# Patient Record
Sex: FEMALE | Race: WHITE | NOT HISPANIC OR LATINO | Employment: FULL TIME | URBAN - METROPOLITAN AREA
[De-identification: names, ages, dates, MRNs, and addresses within clinical notes are randomized per-mention and may not be internally consistent; named-entity substitution may affect disease eponyms.]

---

## 2017-01-09 DIAGNOSIS — Z01.810 PRE-OPERATIVE CARDIOVASCULAR EXAMINATION: ICD-10-CM

## 2017-01-09 DIAGNOSIS — Z01.812 PRE-PROCEDURAL LABORATORY EXAMINATION: ICD-10-CM

## 2017-01-09 LAB
ANION GAP SERPL CALC-SCNC: 7 MMOL/L (ref 0–11.9)
BUN SERPL-MCNC: 10 MG/DL (ref 8–22)
CALCIUM SERPL-MCNC: 9.2 MG/DL (ref 8.5–10.5)
CHLORIDE SERPL-SCNC: 103 MMOL/L (ref 96–112)
CO2 SERPL-SCNC: 27 MMOL/L (ref 20–33)
CREAT SERPL-MCNC: 0.71 MG/DL (ref 0.5–1.4)
ERYTHROCYTE [DISTWIDTH] IN BLOOD BY AUTOMATED COUNT: 44.8 FL (ref 35.9–50)
GFR SERPL CREATININE-BSD FRML MDRD: >60 ML/MIN/1.73 M 2
GLUCOSE SERPL-MCNC: 91 MG/DL (ref 65–99)
HCT VFR BLD AUTO: 31.2 % (ref 37–47)
HGB BLD-MCNC: 9.4 G/DL (ref 12–16)
MCH RBC QN AUTO: 21.3 PG (ref 27–33)
MCHC RBC AUTO-ENTMCNC: 30.1 G/DL (ref 33.6–35)
MCV RBC AUTO: 70.6 FL (ref 81.4–97.8)
PLATELET # BLD AUTO: 771 K/UL (ref 164–446)
PMV BLD AUTO: 9.4 FL (ref 9–12.9)
POTASSIUM SERPL-SCNC: 3.7 MMOL/L (ref 3.6–5.5)
RBC # BLD AUTO: 4.42 M/UL (ref 4.2–5.4)
SODIUM SERPL-SCNC: 137 MMOL/L (ref 135–145)
WBC # BLD AUTO: 6.9 K/UL (ref 4.8–10.8)

## 2017-01-09 PROCEDURE — 85027 COMPLETE CBC AUTOMATED: CPT

## 2017-01-09 PROCEDURE — 36415 COLL VENOUS BLD VENIPUNCTURE: CPT

## 2017-01-09 PROCEDURE — 80048 BASIC METABOLIC PNL TOTAL CA: CPT

## 2017-01-10 LAB — EKG IMPRESSION: NORMAL

## 2017-01-11 ENCOUNTER — OUTPATIENT INFUSION SERVICES (OUTPATIENT)
Dept: ONCOLOGY | Facility: MEDICAL CENTER | Age: 52
End: 2017-01-11
Attending: INTERNAL MEDICINE
Payer: COMMERCIAL

## 2017-01-11 VITALS
SYSTOLIC BLOOD PRESSURE: 134 MMHG | RESPIRATION RATE: 18 BRPM | WEIGHT: 209.44 LBS | DIASTOLIC BLOOD PRESSURE: 76 MMHG | HEART RATE: 79 BPM | TEMPERATURE: 98.1 F | OXYGEN SATURATION: 99 % | BODY MASS INDEX: 37.11 KG/M2 | HEIGHT: 63 IN

## 2017-01-11 PROCEDURE — 700102 HCHG RX REV CODE 250 W/ 637 OVERRIDE(OP): Performed by: INTERNAL MEDICINE

## 2017-01-11 PROCEDURE — 700111 HCHG RX REV CODE 636 W/ 250 OVERRIDE (IP)

## 2017-01-11 PROCEDURE — 700105 HCHG RX REV CODE 258

## 2017-01-11 PROCEDURE — 700111 HCHG RX REV CODE 636 W/ 250 OVERRIDE (IP): Performed by: INTERNAL MEDICINE

## 2017-01-11 PROCEDURE — 306780 HCHG STAT FOR TRANSFUSION PER CASE

## 2017-01-11 PROCEDURE — 96365 THER/PROPH/DIAG IV INF INIT: CPT

## 2017-01-11 PROCEDURE — 96366 THER/PROPH/DIAG IV INF ADDON: CPT

## 2017-01-11 PROCEDURE — 700105 HCHG RX REV CODE 258: Performed by: INTERNAL MEDICINE

## 2017-01-11 PROCEDURE — A9270 NON-COVERED ITEM OR SERVICE: HCPCS | Performed by: INTERNAL MEDICINE

## 2017-01-11 RX ORDER — DIPHENHYDRAMINE HCL 25 MG
25 TABLET ORAL ONCE
Status: COMPLETED | OUTPATIENT
Start: 2017-01-11 | End: 2017-01-11

## 2017-01-11 RX ORDER — ACETAMINOPHEN 325 MG/1
650 TABLET ORAL ONCE
Status: COMPLETED | OUTPATIENT
Start: 2017-01-11 | End: 2017-01-11

## 2017-01-11 RX ADMIN — ACETAMINOPHEN 650 MG: 325 TABLET, FILM COATED ORAL at 11:01

## 2017-01-11 RX ADMIN — IRON DEXTRAN 25 MG: 50 INJECTION INTRAMUSCULAR; INTRAVENOUS at 11:11

## 2017-01-11 RX ADMIN — IRON DEXTRAN 1300 MG: 50 INJECTION INTRAMUSCULAR; INTRAVENOUS at 12:50

## 2017-01-11 RX ADMIN — DIPHENHYDRAMINE HCL 25 MG: 25 TABLET ORAL at 11:01

## 2017-01-11 NOTE — PROGRESS NOTES
IV Iron Per Pharmacy Note    Patient Lean Body Weight:  52 kg  Reason for Iron Replacement: Iron Deficiency Anemia    Lab Results   Component Value Date/Time    WBC 6.9 01/09/2017 08:12 AM    RBC 4.42 01/09/2017 08:12 AM    HEMOGLOBIN 9.4* 01/09/2017 08:12 AM    HEMATOCRIT 31.2* 01/09/2017 08:12 AM    MCV 70.6* 01/09/2017 08:12 AM    MCH 21.3* 01/09/2017 08:12 AM    MCHC 30.1* 01/09/2017 08:12 AM    MPV 9.4 01/09/2017 08:12 AM          Ref. Range 11/4/2016 08:20   Iron Latest Ref Range:  ug/dL 11 (L)   Total Iron Binding Latest Ref Range: 250-450 ug/dL 475 (H)   % Saturation Latest Ref Range: 15-55 % 2 (L)        Recent Labs      01/09/17   0812   CREATININE  0.71          Assessment/Plan:  1. IV Iron Indicated.   2. Give Iron Dextran 25 mg IV test dose following diphenhydramine/acetaminophen premeds over 30 minutes per protocol.  3. If no reaction (Anaphylaxis, Hypotension/Hypertension, N/V/D, Chest pain/Back Pain, Urticaria/Pruritis) in the next hour, proceed to full dose. Nursing to call the pharmacy for full dose.  4. Full dose: Iron Dextran 1300 mg IV over 4 hours. Continue to monitor for delayed ADR including: Arthralgia/myalgia, Headache/backache, chills/dizziness/malaise, moderate to high fever and n/v.      Zahra Wu, AlecD

## 2017-01-12 NOTE — PROGRESS NOTES
Pt arrived to infusion for Iron Dextran. PIV access est, brisk blood return. Pt premedicated per order. Test dose infused hour obs complete. No s/sx of reaction. Remaining dose administered per order. Pt tolerated well. PIV flushed and removed. Pt dc'd home self care in no acute distress. Future appointment confirmed.

## 2017-01-13 PROBLEM — K81.1 CHRONIC CHOLECYSTITIS: Status: ACTIVE | Noted: 2017-01-13

## 2017-01-17 ENCOUNTER — HOSPITAL ENCOUNTER (OUTPATIENT)
Dept: LAB | Facility: MEDICAL CENTER | Age: 52
End: 2017-01-17
Attending: INTERNAL MEDICINE
Payer: COMMERCIAL

## 2017-01-17 LAB
FERRITIN SERPL-MCNC: 746.4 NG/ML (ref 10–291)
FOLATE SERPL-MCNC: 7.4 NG/ML
HBV CORE IGM SERPL QL IA: NEGATIVE
HBV SURFACE AB SER RIA-ACNC: 3.88 MIU/ML (ref 0–10)
HBV SURFACE AG SERPL QL IA: NEGATIVE
HCV AB S/CO SERPL IA: NEGATIVE
HGB RETIC QN AUTO: 35.3 PG/CELL (ref 29–35)
HIV 1+2 AB+HIV1 P24 AG SERPL QL IA: NON REACTIVE
IMM RETIC FRACTION L335166: 35 % (ref 9.3–17.4)
IRON SATN MFR SERPL: 16 % (ref 15–55)
IRON SERPL-MCNC: 73 UG/DL (ref 40–170)
LDH SERPL L TO P-CCNC: 199 U/L (ref 107–266)
RETICS # AUTO: 0.23 M/UL (ref 0.04–0.06)
RETICS/RBC NFR: 5.4 % (ref 0.8–2.1)
TIBC SERPL-MCNC: 449 UG/DL (ref 250–450)
VIT B12 SERPL-MCNC: 290 PG/ML (ref 211–911)

## 2017-01-17 PROCEDURE — 86705 HEP B CORE ANTIBODY IGM: CPT

## 2017-01-17 PROCEDURE — 82728 ASSAY OF FERRITIN: CPT

## 2017-01-17 PROCEDURE — 87389 HIV-1 AG W/HIV-1&-2 AB AG IA: CPT

## 2017-01-17 PROCEDURE — 86803 HEPATITIS C AB TEST: CPT

## 2017-01-17 PROCEDURE — 82607 VITAMIN B-12: CPT

## 2017-01-17 PROCEDURE — 86706 HEP B SURFACE ANTIBODY: CPT

## 2017-01-17 PROCEDURE — 82668 ASSAY OF ERYTHROPOIETIN: CPT

## 2017-01-17 PROCEDURE — 36415 COLL VENOUS BLD VENIPUNCTURE: CPT

## 2017-01-17 PROCEDURE — 83540 ASSAY OF IRON: CPT

## 2017-01-17 PROCEDURE — 87340 HEPATITIS B SURFACE AG IA: CPT

## 2017-01-17 PROCEDURE — 85046 RETICYTE/HGB CONCENTRATE: CPT

## 2017-01-17 PROCEDURE — 82746 ASSAY OF FOLIC ACID SERUM: CPT

## 2017-01-17 PROCEDURE — 83615 LACTATE (LD) (LDH) ENZYME: CPT

## 2017-01-17 PROCEDURE — 83550 IRON BINDING TEST: CPT

## 2017-01-18 LAB — EPO SERPL-ACNC: 39 MU/ML (ref 4–27)

## 2017-01-26 ENCOUNTER — GYNECOLOGY VISIT (OUTPATIENT)
Dept: OBGYN | Facility: MEDICAL CENTER | Age: 52
End: 2017-01-26
Payer: COMMERCIAL

## 2017-01-26 VITALS
BODY MASS INDEX: 37.03 KG/M2 | SYSTOLIC BLOOD PRESSURE: 100 MMHG | DIASTOLIC BLOOD PRESSURE: 66 MMHG | HEIGHT: 63 IN | WEIGHT: 209 LBS

## 2017-01-26 DIAGNOSIS — N93.9 ABNORMAL UTERINE BLEEDING (AUB): ICD-10-CM

## 2017-01-26 DIAGNOSIS — D50.0 IRON DEFICIENCY ANEMIA DUE TO CHRONIC BLOOD LOSS: ICD-10-CM

## 2017-01-26 DIAGNOSIS — D25.9 UTERINE LEIOMYOMA, UNSPECIFIED LOCATION: ICD-10-CM

## 2017-01-26 PROCEDURE — 99203 OFFICE O/P NEW LOW 30 MIN: CPT | Performed by: OBSTETRICS & GYNECOLOGY
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
## 2017-01-26 NOTE — MR AVS SNAPSHOT
"        Toyin Gallagher   2017 9:30 AM   Gynecology Visit   MRN: [id number]    [facility]   Dept Phone:  [phone]    Description:  Female : 1965   Provider:  Neha Montesinos M.D.           Reason for Visit     Other Review u/s results- Fibroids      Allergies as of 2017     No Known Allergies      Vital Signs     Blood Pressure Height Weight Body Mass Index Last Menstrual Period Smoking Status    100/66 mmHg 1.6 m (5' 2.99\") 94.802 kg (209 lb) 37.03 kg/m2 2017 Never Smoker       Basic Information     Date Of Birth Sex Race Ethnicity Preferred Language    1965 Female White Non- English      Your appointments     2017  9:00 AM   CT BODY WITH with [facility]   [facility] [facility]    [facility] [place]  [address]  [address]  [place] [id number] [phone]           Taking medications as regularly scheduled is strongly encouraged.  *For Abdominal CT-Patient needs to  oral contrast and instruction from the department at least 2 hours prior to exam. Patient may  contrast at any imaging facility.            2017 10:00 AM   Hematology Est Patient with Gabriele Gonzalez M.D.   [facility] (--)    [address], [address]  [place] [id number] [phone]              Problem List              ICD-10-CM Priority Class Noted - Resolved    Hyperlipidemia LDL goal <130 E78.5   Unknown - Present    Vitamin D deficiency E55.9   Unknown - Present    Hypothyroidism E03.9   Unknown - Present    Hypertension I10   Unknown - Present    Anemia D64.9   Unknown - Present    Obesity (BMI 35.0-39.9 without comorbidity) (HCC) E66.9   2016 - Present    Iron deficiency anemia D50.9   2016 - Present    Thrombocythemia (CMS-HCC) D47.3   2016 - Present    Chronic cholecystitis K81.1   2017 - Present      Health Maintenance        Date Due Completion Dates    IMM " DTaP/Tdap/Td Vaccine (1 - Tdap) 10/1/1986 ---    IMM INFLUENZA (1) 9/1/2016 ---    MAMMOGRAM 2/16/2017 2/16/2016, 5/31/2013, 5/29/2013    PAP SMEAR 11/3/2019 11/3/2016, 11/3/2016 (Done), 5/31/2013 (Done)    Override on 11/3/2016: Done    Override on 5/31/2013: Done    COLONOSCOPY 12/15/2026 12/15/2016            Current Immunizations     No immunizations on file.      Below and/or attached are the medications your provider expects you to take. Review all of your home medications and newly ordered medications with your provider and/or pharmacist. Follow medication instructions as directed by your provider and/or pharmacist. Please keep your medication list with you and share with your provider. Update the information when medications are discontinued, doses are changed, or new medications (including over-the-counter products) are added; and carry medication information at all times in the event of emergency situations     Allergies:  No Known Allergies          Medications  Valid as of: January 26, 2017 - 10:07 AM    Generic Name Brand Name Tablet Size Instructions for use    Amoxicillin (Cap) AMOXIL 500 MG Take 500 mg by mouth 2 times a day. 2 caps twice a day for 14 days.        Clarithromycin (Tab) BIAXIN 500 MG Take 500 mg by mouth 2 times a day. 1 tab twice a day for 14 days.        GuaiFENesin   Take 400 mg by mouth. Taking 1 tab every 4-5 hours.        Lansoprazole (CAPSULE DELAYED RELEASE) PREVACID 30 MG Take 30 mg by mouth 2 Times a Day. 1 tab twice a day for 14 days.        Levothyroxine Sodium (Tab) SYNTHROID 125 MCG Take 1 Tab by mouth Every morning on an empty stomach.        Triamterene-HCTZ (Cap) MAXZIDE-25/DYAZIDE 37.5-25 MG Take 1 Cap by mouth every morning.        .                 Medicines prescribed today were sent to:     [id number] [address] [facility]    [address] [facility] [place] [address]    Phone: [phone] Fax: [phone]    Open 24 Hours?: No      Medication refill  instructions:       If your prescription bottle indicates you have medication refills left, it is not necessary to call your provider’s office. Please contact your pharmacy and they will refill your medication.    If your prescription bottle indicates you do not have any refills left, you may request refills at any time through one of the following ways: The online [employer] system (except Urgent Care), by calling your provider’s office, or by asking your pharmacy to contact your provider’s office with a refill request. Medication refills are processed only during regular business hours and may not be available until the next business day. Your provider may request additional information or to have a follow-up visit with you prior to refilling your medication.   *Please Note: Medication refills are assigned a new Rx number when refilled electronically. Your pharmacy may indicate that no refills were authorized even though a new prescription for the same medication is available at the pharmacy. Please request the medicine by name with the pharmacy before contacting your provider for a refill.           [employer] Access Code: Activation code not generated  Current [employer] Status: Active

## 2017-02-01 ENCOUNTER — HOSPITAL ENCOUNTER (OUTPATIENT)
Dept: RADIOLOGY | Facility: MEDICAL CENTER | Age: 52
End: 2017-02-01
Attending: INTERNAL MEDICINE
Payer: COMMERCIAL

## 2017-02-01 DIAGNOSIS — A04.8 POSITIVE H. PYLORI TEST: ICD-10-CM

## 2017-02-01 PROCEDURE — 71260 CT THORAX DX C+: CPT

## 2017-02-01 PROCEDURE — 700117 HCHG RX CONTRAST REV CODE 255: Performed by: INTERNAL MEDICINE

## 2017-02-01 RX ADMIN — IOHEXOL 100 ML: 350 INJECTION, SOLUTION INTRAVENOUS at 09:20

## 2017-02-15 DIAGNOSIS — E78.5 HYPERLIPIDEMIA LDL GOAL <130: ICD-10-CM

## 2017-02-15 DIAGNOSIS — E03.8 OTHER SPECIFIED HYPOTHYROIDISM: ICD-10-CM

## 2017-02-15 RX ORDER — LEVOTHYROXINE SODIUM 0.12 MG/1
125 TABLET ORAL
Qty: 90 TAB | Refills: 2 | Status: SHIPPED | OUTPATIENT
Start: 2017-02-15 | End: 2017-11-28 | Stop reason: SDUPTHER

## 2017-02-15 RX ORDER — TRIAMTERENE AND HYDROCHLOROTHIAZIDE 37.5; 25 MG/1; MG/1
1 CAPSULE ORAL EVERY MORNING
Qty: 90 CAP | Refills: 2 | Status: SHIPPED | OUTPATIENT
Start: 2017-02-15 | End: 2017-11-28 | Stop reason: SDUPTHER

## 2017-02-15 NOTE — TELEPHONE ENCOUNTER
Was the patient seen in the last year in this department? Yes 12-   Does patient have an active prescription for medications requested? No     Received Request Via: Pharmacy

## 2017-02-22 ENCOUNTER — GYNECOLOGY VISIT (OUTPATIENT)
Dept: OBGYN | Facility: MEDICAL CENTER | Age: 52
End: 2017-02-22
Payer: COMMERCIAL

## 2017-02-22 ENCOUNTER — HOSPITAL ENCOUNTER (OUTPATIENT)
Facility: MEDICAL CENTER | Age: 52
End: 2017-02-22
Attending: OBSTETRICS & GYNECOLOGY
Payer: COMMERCIAL

## 2017-02-22 VITALS
HEIGHT: 63 IN | WEIGHT: 216 LBS | SYSTOLIC BLOOD PRESSURE: 100 MMHG | DIASTOLIC BLOOD PRESSURE: 76 MMHG | BODY MASS INDEX: 38.27 KG/M2

## 2017-02-22 DIAGNOSIS — N93.9 ABNORMAL UTERINE BLEEDING (AUB): ICD-10-CM

## 2017-02-22 DIAGNOSIS — D25.9 UTERINE LEIOMYOMA, UNSPECIFIED LOCATION: ICD-10-CM

## 2017-02-22 LAB
INT CON NEG: NEGATIVE
INT CON POS: POSITIVE
PATHOLOGY CONSULT NOTE: NORMAL
POC URINE PREGNANCY TEST: NEGATIVE

## 2017-02-22 PROCEDURE — 58100 BIOPSY OF UTERUS LINING: CPT | Performed by: OBSTETRICS & GYNECOLOGY

## 2017-02-22 PROCEDURE — 81025 URINE PREGNANCY TEST: CPT | Performed by: OBSTETRICS & GYNECOLOGY

## 2017-02-22 PROCEDURE — 88305 TISSUE EXAM BY PATHOLOGIST: CPT

## 2017-02-22 NOTE — MR AVS SNAPSHOT
"        Toyin Gallagher   2017 10:15 AM   Gynecology Visit   MRN: [id number]    Department:  [facility]   Dept Phone:  [phone]    Description:  Female : 1965   Provider:  Neha Montesinos M.D.           Reason for Visit     Procedure ENDOMETRAIL BIOPSY       Allergies as of 2017     No Known Allergies      You were diagnosed with     Abnormal uterine bleeding (AUB)   [4074407]       Uterine leiomyoma, unspecified location   [[id number]]         Vital Signs     Blood Pressure Height Weight Body Mass Index Last Menstrual Period Smoking Status    100/76 mmHg 1.6 m (5' 2.99\") 97.977 kg (216 lb) 38.27 kg/m2 2017 Never Smoker       Basic Information     Date Of Birth Sex Race Ethnicity Preferred Language    1965 Female White Non- English      Your appointments     2017 10:00 AM   Hematology Est Patient with Gabriele Gonzalez M.D.   [facility] (--)    [address] [place] [address], [address]  [facility] [id number] [phone]              Problem List              ICD-10-CM Priority Class Noted - Resolved    Hyperlipidemia LDL goal <130 E78.5   Unknown - Present    Vitamin D deficiency E55.9   Unknown - Present    Hypothyroidism E03.9   Unknown - Present    Hypertension I10   Unknown - Present    Anemia D64.9   Unknown - Present    Obesity (BMI 35.0-39.9 without comorbidity) ([facility]) E66.9   2016 - Present    Iron deficiency anemia D50.9   2016 - Present    Thrombocythemia (CMS-HCC) D47.3   2016 - Present    Chronic cholecystitis K81.1   2017 - Present      Health Maintenance        Date Due Completion Dates    IMM DTaP/Tdap/Td Vaccine (1 - Tdap) 10/1/1986 ---    IMM INFLUENZA (1) 2016 ---    MAMMOGRAM 2017, 2013, 2013    PAP SMEAR 11/3/2019 11/3/2016, 11/3/2016 (Done), 2013 (Done)    Override on 11/3/2016: Done    Override on 2013: Done    COLONOSCOPY 12/15/2026 12/15/2016            Current " Immunizations     No immunizations on file.      Below and/or attached are the medications your provider expects you to take. Review all of your home medications and newly ordered medications with your provider and/or pharmacist. Follow medication instructions as directed by your provider and/or pharmacist. Please keep your medication list with you and share with your provider. Update the information when medications are discontinued, doses are changed, or new medications (including over-the-counter products) are added; and carry medication information at all times in the event of emergency situations     Allergies:  No Known Allergies          Medications  Valid as of: February 22, 2017 - 11:00 AM    Generic Name Brand Name Tablet Size Instructions for use    Amoxicillin (Cap) AMOXIL 500 MG Take 500 mg by mouth 2 times a day. 2 caps twice a day for 14 days.        Clarithromycin (Tab) BIAXIN 500 MG Take 500 mg by mouth 2 times a day. 1 tab twice a day for 14 days.        GuaiFENesin   Take 400 mg by mouth. Taking 1 tab every 4-5 hours.        Lansoprazole (CAPSULE DELAYED RELEASE) PREVACID 30 MG Take 30 mg by mouth 2 Times a Day. 1 tab twice a day for 14 days.        Levothyroxine Sodium (Tab) SYNTHROID 125 MCG Take 1 Tab by mouth Every morning on an empty stomach.        Triamterene-HCTZ (Cap) MAXZIDE-25/DYAZIDE 37.5-25 MG Take 1 Cap by mouth every morning.        .                 Medicines prescribed today were sent to:     [facility] [id number] [facility] [place] - [address] [address] [address]    Phone: [phone] Fax: [phone]    Open 24 Hours?: No      Medication refill instructions:       If your prescription bottle indicates you have medication refills left, it is not necessary to call your provider’s office. Please contact your pharmacy and they will refill your medication.    If your prescription bottle indicates you do not have any refills left, you may request refills at any time  through one of the following ways: The online [employer] system (except Urgent Care), by calling your provider’s office, or by asking your pharmacy to contact your provider’s office with a refill request. Medication refills are processed only during regular business hours and may not be available until the next business day. Your provider may request additional information or to have a follow-up visit with you prior to refilling your medication.   *Please Note: Medication refills are assigned a new Rx number when refilled electronically. Your pharmacy may indicate that no refills were authorized even though a new prescription for the same medication is available at the pharmacy. Please request the medicine by name with the pharmacy before contacting your provider for a refill.        Your To Do List     Future Labs/Procedures Complete By Expires    PATHOLOGY SPECIMEN  As directed 2/23/2018         [employer] Access Code: Activation code not generated  Current [employer] Status: Active

## 2017-02-23 ENCOUNTER — OFFICE VISIT (OUTPATIENT)
Dept: HEMATOLOGY ONCOLOGY | Facility: MEDICAL CENTER | Age: 52
End: 2017-02-23
Payer: COMMERCIAL

## 2017-02-23 VITALS
WEIGHT: 215 LBS | OXYGEN SATURATION: 98 % | TEMPERATURE: 97.4 F | RESPIRATION RATE: 16 BRPM | BODY MASS INDEX: 38.09 KG/M2 | DIASTOLIC BLOOD PRESSURE: 80 MMHG | HEART RATE: 65 BPM | SYSTOLIC BLOOD PRESSURE: 120 MMHG

## 2017-02-23 DIAGNOSIS — D50.0 IRON DEFICIENCY ANEMIA DUE TO CHRONIC BLOOD LOSS: ICD-10-CM

## 2017-02-23 PROCEDURE — 99213 OFFICE O/P EST LOW 20 MIN: CPT | Performed by: INTERNAL MEDICINE

## 2017-02-23 ASSESSMENT — PAIN SCALES - GENERAL: PAINLEVEL: 2=MINIMAL-SLIGHT
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
## 2017-02-23 NOTE — MR AVS SNAPSHOT
Toyin Gallagher   2017 10:00 AM   Office Visit   MRN: [id number]    Department:  [facility]   Dept Phone:  [phone]    Description:  Female : 1965   Provider:  Gabriele Gonzalez M.D.           Reason for Visit     Follow-Up           Allergies as of 2017     No Known Allergies      You were diagnosed with     Iron deficiency anemia due to chronic blood loss   [[id number]]         Vital Signs     Blood Pressure Pulse Temperature Respirations Weight Oxygen Saturation    120/80 mmHg 65 36.3 °C (97.4 °F) 16 97.523 kg (215 lb) 98%    Last Menstrual Period Breastfeeding? Smoking Status             2017 No Never Smoker          Basic Information     Date Of Birth Sex Race Ethnicity Preferred Language    1965 Female White Non- English      Your appointments     May 24, 2017 10:40 AM   Hematology Est Patient with Gabriele Gonzalez M.D.   [facility] (--)    [address] [place] [address], [address]  [facility] [address] [phone]              Problem List              ICD-10-CM Priority Class Noted - Resolved    Hyperlipidemia LDL goal <130 E78.5   Unknown - Present    Vitamin D deficiency E55.9   Unknown - Present    Hypothyroidism E03.9   Unknown - Present    Hypertension I10   Unknown - Present    Anemia D64.9   Unknown - Present    Obesity (BMI 35.0-39.9 without comorbidity) (HCC) E66.9   2016 - Present    Iron deficiency anemia D50.9   2016 - Present    Thrombocythemia (CMS-HCC) D47.3   2016 - Present    Chronic cholecystitis K81.1   2017 - Present      Health Maintenance        Date Due Completion Dates    IMM DTaP/Tdap/Td Vaccine (1 - Tdap) 10/1/1986 ---    IMM INFLUENZA (1) 2016 ---    MAMMOGRAM 2017, 2013, 2013    PAP SMEAR 11/3/2019 11/3/2016, 11/3/2016 (Done), 2013 (Done)    Override on 11/3/2016: Done    Override on 2013: Done    COLONOSCOPY 12/15/2026 12/15/2016            Current Immunizations     No  immunizations on file.      Below and/or attached are the medications your provider expects you to take. Review all of your home medications and newly ordered medications with your provider and/or pharmacist. Follow medication instructions as directed by your provider and/or pharmacist. Please keep your medication list with you and share with your provider. Update the information when medications are discontinued, doses are changed, or new medications (including over-the-counter products) are added; and carry medication information at all times in the event of emergency situations     Allergies:  No Known Allergies          Medications  Valid as of: February 23, 2017 - 10:23 AM    Generic Name Brand Name Tablet Size Instructions for use    Amoxicillin (Cap) AMOXIL 500 MG Take 500 mg by mouth 2 times a day. 2 caps twice a day for 14 days.        Clarithromycin (Tab) BIAXIN 500 MG Take 500 mg by mouth 2 times a day. 1 tab twice a day for 14 days.        GuaiFENesin   Take 400 mg by mouth. Taking 1 tab every 4-5 hours.        Lansoprazole (CAPSULE DELAYED RELEASE) PREVACID 30 MG Take 30 mg by mouth 2 Times a Day. 1 tab twice a day for 14 days.        Levothyroxine Sodium (Tab) SYNTHROID 125 MCG Take 1 Tab by mouth Every morning on an empty stomach.        Triamterene-HCTZ (Cap) MAXZIDE-25/DYAZIDE 37.5-25 MG Take 1 Cap by mouth every morning.        .                 Medicines prescribed today were sent to:     [id number] [place] - [address] [facility]    [address] [facility] [address]    Phone: [phone] Fax: [phone]    Open 24 Hours?: No      Medication refill instructions:       If your prescription bottle indicates you have medication refills left, it is not necessary to call your provider’s office. Please contact your pharmacy and they will refill your medication.    If your prescription bottle indicates you do not have any refills left, you may request refills at any time through one of the  following ways: The online [employer] system (except Urgent Care), by calling your provider’s office, or by asking your pharmacy to contact your provider’s office with a refill request. Medication refills are processed only during regular business hours and may not be available until the next business day. Your provider may request additional information or to have a follow-up visit with you prior to refilling your medication.   *Please Note: Medication refills are assigned a new Rx number when refilled electronically. Your pharmacy may indicate that no refills were authorized even though a new prescription for the same medication is available at the pharmacy. Please request the medicine by name with the pharmacy before contacting your provider for a refill.        Your To Do List     Future Labs/Procedures Complete By Expires    CBC WITH DIFFERENTIAL  5/16/2017 2/23/2018    FERRITIN  5/16/2017 2/23/2018    IRON/TOTAL IRON BIND  5/16/2017 2/23/2018         [employer] Access Code: Activation code not generated  Current [employer] Status: Active
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
## 2017-05-23 ENCOUNTER — HOSPITAL ENCOUNTER (OUTPATIENT)
Dept: LAB | Facility: MEDICAL CENTER | Age: 52
End: 2017-05-23
Attending: INTERNAL MEDICINE
Payer: COMMERCIAL

## 2017-05-23 DIAGNOSIS — D50.0 IRON DEFICIENCY ANEMIA DUE TO CHRONIC BLOOD LOSS: ICD-10-CM

## 2017-05-23 LAB
BASOPHILS # BLD AUTO: 0.4 % (ref 0–1.8)
BASOPHILS # BLD: 0.03 K/UL (ref 0–0.12)
EOSINOPHIL # BLD AUTO: 0.18 K/UL (ref 0–0.51)
EOSINOPHIL NFR BLD: 2.6 % (ref 0–6.9)
ERYTHROCYTE [DISTWIDTH] IN BLOOD BY AUTOMATED COUNT: 43.7 FL (ref 35.9–50)
FERRITIN SERPL-MCNC: 8.7 NG/ML (ref 10–291)
HCT VFR BLD AUTO: 39.4 % (ref 37–47)
HGB BLD-MCNC: 12.4 G/DL (ref 12–16)
IMM GRANULOCYTES # BLD AUTO: 0.02 K/UL (ref 0–0.11)
IMM GRANULOCYTES NFR BLD AUTO: 0.3 % (ref 0–0.9)
IRON SATN MFR SERPL: 14 % (ref 15–55)
IRON SERPL-MCNC: 62 UG/DL (ref 40–170)
LYMPHOCYTES # BLD AUTO: 2.16 K/UL (ref 1–4.8)
LYMPHOCYTES NFR BLD: 31.2 % (ref 22–41)
MCH RBC QN AUTO: 26.1 PG (ref 27–33)
MCHC RBC AUTO-ENTMCNC: 31.5 G/DL (ref 33.6–35)
MCV RBC AUTO: 82.8 FL (ref 81.4–97.8)
MONOCYTES # BLD AUTO: 0.57 K/UL (ref 0–0.85)
MONOCYTES NFR BLD AUTO: 8.2 % (ref 0–13.4)
NEUTROPHILS # BLD AUTO: 3.96 K/UL (ref 2–7.15)
NEUTROPHILS NFR BLD: 57.3 % (ref 44–72)
NRBC # BLD AUTO: 0 K/UL
NRBC BLD AUTO-RTO: 0 /100 WBC
PLATELET # BLD AUTO: 502 K/UL (ref 164–446)
PMV BLD AUTO: 10.1 FL (ref 9–12.9)
RBC # BLD AUTO: 4.76 M/UL (ref 4.2–5.4)
TIBC SERPL-MCNC: 441 UG/DL (ref 250–450)
WBC # BLD AUTO: 6.9 K/UL (ref 4.8–10.8)

## 2017-05-23 PROCEDURE — 83550 IRON BINDING TEST: CPT

## 2017-05-23 PROCEDURE — 85025 COMPLETE CBC W/AUTO DIFF WBC: CPT

## 2017-05-23 PROCEDURE — 82728 ASSAY OF FERRITIN: CPT

## 2017-05-23 PROCEDURE — 83540 ASSAY OF IRON: CPT

## 2017-05-23 PROCEDURE — 36415 COLL VENOUS BLD VENIPUNCTURE: CPT

## 2017-05-24 ENCOUNTER — OFFICE VISIT (OUTPATIENT)
Dept: HEMATOLOGY ONCOLOGY | Facility: MEDICAL CENTER | Age: 52
End: 2017-05-24
Payer: COMMERCIAL

## 2017-05-24 VITALS
OXYGEN SATURATION: 95 % | HEART RATE: 66 BPM | WEIGHT: 226.85 LBS | RESPIRATION RATE: 16 BRPM | DIASTOLIC BLOOD PRESSURE: 68 MMHG | TEMPERATURE: 99 F | BODY MASS INDEX: 40.2 KG/M2 | SYSTOLIC BLOOD PRESSURE: 110 MMHG | HEIGHT: 63 IN

## 2017-05-24 DIAGNOSIS — D50.0 IRON DEFICIENCY ANEMIA DUE TO CHRONIC BLOOD LOSS: ICD-10-CM

## 2017-05-24 PROCEDURE — 99213 OFFICE O/P EST LOW 20 MIN: CPT | Performed by: INTERNAL MEDICINE

## 2017-05-24 ASSESSMENT — PAIN SCALES - GENERAL: PAINLEVEL: NO PAIN
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
## 2017-05-24 NOTE — MR AVS SNAPSHOT
"        Toyin Gallagher   2017 10:40 AM   Office Visit   MRN: [id number]    Department:  [facility]   Dept Phone:  [phone]    Description:  Female : 1965   Provider:  Gabriele Gonzalez M.D.           Reason for Visit     Follow-Up 3 mo with labs      Allergies as of 2017     No Known Allergies      You were diagnosed with     Iron deficiency anemia due to chronic blood loss   [199788]         Vital Signs     Blood Pressure Pulse Temperature Respirations Height Weight    110/68 mmHg 66 37.2 °C (99 °F) 16 1.6 m (5' 2.99\") 102.9 kg (226 lb 13.7 oz)    Body Mass Index Oxygen Saturation Breastfeeding? Smoking Status          40.20 kg/m2 95% No Never Smoker         Basic Information     Date Of Birth Sex Race Ethnicity Preferred Language    1965 Female White Non- English      Your appointments     Aug 24, 2017 10:40 AM   Hematology Est Patient with Gabriele Gonzalez M.D.   [facility] (--)    [address] [place] [address], [address]  [facility] [id number] [phone]              Problem List              ICD-10-CM Priority Class Noted - Resolved    Hyperlipidemia LDL goal <130 E78.5   Unknown - Present    Vitamin D deficiency E55.9   Unknown - Present    Hypothyroidism E03.9   Unknown - Present    Hypertension I10   Unknown - Present    Anemia D64.9   Unknown - Present    Obesity (BMI 35.0-39.9 without comorbidity) ([facility]) E66.9   2016 - Present    Iron deficiency anemia D50.9   2016 - Present    Thrombocythemia (CMS-HCC) D47.3   2016 - Present    Chronic cholecystitis K81.1   2017 - Present      Health Maintenance        Date Due Completion Dates    IMM DTaP/Tdap/Td Vaccine (1 - Tdap) 10/1/1986 ---    MAMMOGRAM 2017, 2013    PAP SMEAR 11/3/2019 11/3/2016, 11/3/2016 (Done), 2013 (Done)    Override on 11/3/2016: Done    Override on 2013: Done    COLONOSCOPY 12/15/2026 12/15/2016            Current Immunizations     No immunizations " on file.      Below and/or attached are the medications your provider expects you to take. Review all of your home medications and newly ordered medications with your provider and/or pharmacist. Follow medication instructions as directed by your provider and/or pharmacist. Please keep your medication list with you and share with your provider. Update the information when medications are discontinued, doses are changed, or new medications (including over-the-counter products) are added; and carry medication information at all times in the event of emergency situations     Allergies:  No Known Allergies          Medications  Valid as of: May 24, 2017 - 11:00 AM    Generic Name Brand Name Tablet Size Instructions for use    Amoxicillin (Cap) AMOXIL 500 MG Take 500 mg by mouth 2 times a day. 2 caps twice a day for 14 days.        Clarithromycin (Tab) BIAXIN 500 MG Take 500 mg by mouth 2 times a day. 1 tab twice a day for 14 days.        GuaiFENesin   Take 400 mg by mouth. Taking 1 tab every 4-5 hours.        Lansoprazole (CAPSULE DELAYED RELEASE) PREVACID 30 MG Take 30 mg by mouth 2 Times a Day. 1 tab twice a day for 14 days.        Levothyroxine Sodium (Tab) SYNTHROID 125 MCG Take 1 Tab by mouth Every morning on an empty stomach.        Triamterene-HCTZ (Cap) MAXZIDE-25/DYAZIDE 37.5-25 MG Take 1 Cap by mouth every morning.        .                 Medicines prescribed today were sent to:     [facility] [address] [place] [address] [facility] [place]    [address] [facility] [address]    Phone: [phone] Fax: [phone]    Open 24 Hours?: No      Medication refill instructions:       If your prescription bottle indicates you have medication refills left, it is not necessary to call your provider’s office. Please contact your pharmacy and they will refill your medication.    If your prescription bottle indicates you do not have any refills left, you may request refills at any time through one of the following ways: The online  [employer] system (except Urgent Care), by calling your provider’s office, or by asking your pharmacy to contact your provider’s office with a refill request. Medication refills are processed only during regular business hours and may not be available until the next business day. Your provider may request additional information or to have a follow-up visit with you prior to refilling your medication.   *Please Note: Medication refills are assigned a new Rx number when refilled electronically. Your pharmacy may indicate that no refills were authorized even though a new prescription for the same medication is available at the pharmacy. Please request the medicine by name with the pharmacy before contacting your provider for a refill.        Your To Do List     Standing Orders Interval Expires    CBC WITH DIFFERENTIAL  3 mo until 5/24/2018 5/24/2018    FERRITIN  3 mo until 5/24/2018 5/24/2018         magnetUt Access Code: Activation code not generated  Current [employer] Status: Active

## 2017-08-21 ENCOUNTER — APPOINTMENT (OUTPATIENT)
Dept: LAB | Facility: MEDICAL CENTER | Age: 52
End: 2017-08-21
Attending: INTERNAL MEDICINE
Payer: COMMERCIAL

## 2017-08-21 DIAGNOSIS — D50.0 IRON DEFICIENCY ANEMIA DUE TO CHRONIC BLOOD LOSS: ICD-10-CM

## 2017-08-21 LAB
BASOPHILS # BLD AUTO: 0.9 % (ref 0–1.8)
BASOPHILS # BLD: 0.06 K/UL (ref 0–0.12)
EOSINOPHIL # BLD AUTO: 0.23 K/UL (ref 0–0.51)
EOSINOPHIL NFR BLD: 3.6 % (ref 0–6.9)
ERYTHROCYTE [DISTWIDTH] IN BLOOD BY AUTOMATED COUNT: 43.8 FL (ref 35.9–50)
FERRITIN SERPL-MCNC: 4.6 NG/ML (ref 10–291)
HCT VFR BLD AUTO: 36 % (ref 37–47)
HGB BLD-MCNC: 10.8 G/DL (ref 12–16)
IMM GRANULOCYTES # BLD AUTO: 0.02 K/UL (ref 0–0.11)
IMM GRANULOCYTES NFR BLD AUTO: 0.3 % (ref 0–0.9)
LYMPHOCYTES # BLD AUTO: 1.48 K/UL (ref 1–4.8)
LYMPHOCYTES NFR BLD: 23.1 % (ref 22–41)
MCH RBC QN AUTO: 23.7 PG (ref 27–33)
MCHC RBC AUTO-ENTMCNC: 30 G/DL (ref 33.6–35)
MCV RBC AUTO: 79.1 FL (ref 81.4–97.8)
MONOCYTES # BLD AUTO: 0.68 K/UL (ref 0–0.85)
MONOCYTES NFR BLD AUTO: 10.6 % (ref 0–13.4)
NEUTROPHILS # BLD AUTO: 3.95 K/UL (ref 2–7.15)
NEUTROPHILS NFR BLD: 61.5 % (ref 44–72)
NRBC # BLD AUTO: 0 K/UL
NRBC BLD AUTO-RTO: 0 /100 WBC
PLATELET # BLD AUTO: 539 K/UL (ref 164–446)
PMV BLD AUTO: 10.3 FL (ref 9–12.9)
RBC # BLD AUTO: 4.55 M/UL (ref 4.2–5.4)
WBC # BLD AUTO: 6.4 K/UL (ref 4.8–10.8)

## 2017-08-21 PROCEDURE — 36415 COLL VENOUS BLD VENIPUNCTURE: CPT

## 2017-08-21 PROCEDURE — 85025 COMPLETE CBC W/AUTO DIFF WBC: CPT

## 2017-08-21 PROCEDURE — 82728 ASSAY OF FERRITIN: CPT

## 2017-08-24 ENCOUNTER — OFFICE VISIT (OUTPATIENT)
Dept: HEMATOLOGY ONCOLOGY | Facility: MEDICAL CENTER | Age: 52
End: 2017-08-24
Payer: COMMERCIAL

## 2017-08-24 VITALS
OXYGEN SATURATION: 100 % | WEIGHT: 216.38 LBS | TEMPERATURE: 98 F | HEIGHT: 63 IN | BODY MASS INDEX: 38.34 KG/M2 | DIASTOLIC BLOOD PRESSURE: 74 MMHG | SYSTOLIC BLOOD PRESSURE: 112 MMHG | HEART RATE: 72 BPM | RESPIRATION RATE: 16 BRPM

## 2017-08-24 DIAGNOSIS — D50.0 IRON DEFICIENCY ANEMIA DUE TO CHRONIC BLOOD LOSS: ICD-10-CM

## 2017-08-24 PROCEDURE — 99213 OFFICE O/P EST LOW 20 MIN: CPT | Performed by: INTERNAL MEDICINE

## 2017-08-24 ASSESSMENT — PAIN SCALES - GENERAL: PAINLEVEL: NO PAIN
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
## 2017-08-24 NOTE — MR AVS SNAPSHOT
"        Toyin Gallagher   2017 10:40 AM   Office Visit   MRN: [id number]    Department:  [facility]   Dept Phone:  [phone]    Description:  Female : 1965   Provider:  Gabriele Gonzalez M.D.           Reason for Visit     Follow-Up 3 month f/v      Allergies as of 2017     No Known Allergies      You were diagnosed with     Iron deficiency anemia due to chronic blood loss   [377261]         Vital Signs     Blood Pressure Pulse Temperature Respirations Height Weight    112/74 mmHg 72 36.7 °C (98 °F) 16 1.6 m (5' 2.99\") 98.15 kg (216 lb 6.1 oz)    Body Mass Index Oxygen Saturation Breastfeeding? Smoking Status          38.34 kg/m2 100% No Never Smoker         Basic Information     Date Of Birth Sex Race Ethnicity Preferred Language    1965 Female White Non- English      Your appointments     Sep 23, 2017  8:30 AM   EST Total Body Iron with RN 3   [facility] ([facility])    [address]  [place] [id number] [phone]              Problem List              ICD-10-CM Priority Class Noted - Resolved    Hyperlipidemia LDL goal <130 E78.5   Unknown - Present    Vitamin D deficiency E55.9   Unknown - Present    Hypothyroidism E03.9   Unknown - Present    Hypertension I10   Unknown - Present    Anemia D64.9   Unknown - Present    Obesity (BMI 35.0-39.9 without comorbidity) ([facility]) E66.9   2016 - Present    Iron deficiency anemia D50.9   2016 - Present    Thrombocythemia (CMS-HCC) D47.3   2016 - Present    Chronic cholecystitis K81.1   2017 - Present      Health Maintenance        Date Due Completion Dates    IMM DTaP/Tdap/Td Vaccine (1 - Tdap) 10/1/1986 ---    MAMMOGRAM 2017, 2013    IMM INFLUENZA (1) 2017 ---    PAP SMEAR 11/3/2019 11/3/2016, 11/3/2016 (Done), 2013 (Done)    Override on 11/3/2016: Done    Override on 2013: Done    COLONOSCOPY 12/15/2026 12/15/2016            Current Immunizations     No " immunizations on file.      Below and/or attached are the medications your provider expects you to take. Review all of your home medications and newly ordered medications with your provider and/or pharmacist. Follow medication instructions as directed by your provider and/or pharmacist. Please keep your medication list with you and share with your provider. Update the information when medications are discontinued, doses are changed, or new medications (including over-the-counter products) are added; and carry medication information at all times in the event of emergency situations     Allergies:  No Known Allergies          Medications  Valid as of: August 24, 2017 - 11:18 AM    Generic Name Brand Name Tablet Size Instructions for use    Amoxicillin (Cap) AMOXIL 500 MG Take 500 mg by mouth 2 times a day. 2 caps twice a day for 14 days.        Clarithromycin (Tab) BIAXIN 500 MG Take 500 mg by mouth 2 times a day. 1 tab twice a day for 14 days.        GuaiFENesin   Take 400 mg by mouth. Taking 1 tab every 4-5 hours.        Lansoprazole (CAPSULE DELAYED RELEASE) PREVACID 30 MG Take 30 mg by mouth 2 Times a Day. 1 tab twice a day for 14 days.        Levothyroxine Sodium (Tab) SYNTHROID 125 MCG Take 1 Tab by mouth Every morning on an empty stomach.        Triamterene-HCTZ (Cap) MAXZIDE-25/DYAZIDE 37.5-25 MG Take 1 Cap by mouth every morning.        .                 Medicines prescribed today were sent to:     [id number] [place] - [address] [facility] [place]    [address] [facility] [address]    Phone: [phone] Fax: [phone]    Open 24 Hours?: No      Medication refill instructions:       If your prescription bottle indicates you have medication refills left, it is not necessary to call your provider’s office. Please contact your pharmacy and they will refill your medication.    If your prescription bottle indicates you do not have any refills left, you may request refills at any time through one of the following  ways: The online [employer] system (except Urgent Care), by calling your provider’s office, or by asking your pharmacy to contact your provider’s office with a refill request. Medication refills are processed only during regular business hours and may not be available until the next business day. Your provider may request additional information or to have a follow-up visit with you prior to refilling your medication.   *Please Note: Medication refills are assigned a new Rx number when refilled electronically. Your pharmacy may indicate that no refills were authorized even though a new prescription for the same medication is available at the pharmacy. Please request the medicine by name with the pharmacy before contacting your provider for a refill.        Your To Do List     Standing Orders Interval Expires    CBC WITH DIFFERENTIAL  6 mo until 8/24/2018 8/24/2018    FERRITIN  6 mo until 8/24/2018 8/24/2018    VITAMIN B12  6 mo until 8/24/2018 8/24/2018         [facility] Access Code: Activation code not generated  Current [facility] Status: Active

## 2017-09-23 ENCOUNTER — OUTPATIENT INFUSION SERVICES (OUTPATIENT)
Dept: ONCOLOGY | Facility: MEDICAL CENTER | Age: 52
End: 2017-09-23
Attending: INTERNAL MEDICINE
Payer: COMMERCIAL

## 2017-09-23 VITALS
RESPIRATION RATE: 18 BRPM | HEIGHT: 63 IN | TEMPERATURE: 98.2 F | HEART RATE: 57 BPM | DIASTOLIC BLOOD PRESSURE: 73 MMHG | WEIGHT: 210.1 LBS | BODY MASS INDEX: 37.23 KG/M2 | OXYGEN SATURATION: 100 % | SYSTOLIC BLOOD PRESSURE: 112 MMHG

## 2017-09-23 DIAGNOSIS — D50.0 IRON DEFICIENCY ANEMIA DUE TO CHRONIC BLOOD LOSS: ICD-10-CM

## 2017-09-23 LAB
ERYTHROCYTE [DISTWIDTH] IN BLOOD BY AUTOMATED COUNT: 42.5 FL (ref 35.9–50)
HCT VFR BLD AUTO: 33.2 % (ref 37–47)
HGB BLD-MCNC: 10.1 G/DL (ref 12–16)
HGB RETIC QN AUTO: 24.1 PG/CELL (ref 29–35)
IMM RETICS NFR: 26.8 % (ref 9.3–17.4)
IRON SATN MFR SERPL: 5 % (ref 15–55)
IRON SERPL-MCNC: 24 UG/DL (ref 40–170)
MCH RBC QN AUTO: 23 PG (ref 27–33)
MCHC RBC AUTO-ENTMCNC: 30.4 G/DL (ref 33.6–35)
MCV RBC AUTO: 75.5 FL (ref 81.4–97.8)
PLATELET # BLD AUTO: 557 K/UL (ref 164–446)
PMV BLD AUTO: 9.5 FL (ref 9–12.9)
RBC # BLD AUTO: 4.4 M/UL (ref 4.2–5.4)
RETICS # AUTO: 0.04 M/UL (ref 0.04–0.06)
RETICS/RBC NFR: 0.9 % (ref 0.8–2.1)
TIBC SERPL-MCNC: 440 UG/DL (ref 250–450)
WBC # BLD AUTO: 6 K/UL (ref 4.8–10.8)

## 2017-09-23 PROCEDURE — 83550 IRON BINDING TEST: CPT

## 2017-09-23 PROCEDURE — 96366 THER/PROPH/DIAG IV INF ADDON: CPT

## 2017-09-23 PROCEDURE — 83540 ASSAY OF IRON: CPT

## 2017-09-23 PROCEDURE — 306780 HCHG STAT FOR TRANSFUSION PER CASE

## 2017-09-23 PROCEDURE — 700105 HCHG RX REV CODE 258: Performed by: INTERNAL MEDICINE

## 2017-09-23 PROCEDURE — 96365 THER/PROPH/DIAG IV INF INIT: CPT

## 2017-09-23 PROCEDURE — 700102 HCHG RX REV CODE 250 W/ 637 OVERRIDE(OP): Performed by: INTERNAL MEDICINE

## 2017-09-23 PROCEDURE — A9270 NON-COVERED ITEM OR SERVICE: HCPCS | Performed by: INTERNAL MEDICINE

## 2017-09-23 PROCEDURE — 85027 COMPLETE CBC AUTOMATED: CPT

## 2017-09-23 PROCEDURE — 85046 RETICYTE/HGB CONCENTRATE: CPT

## 2017-09-23 PROCEDURE — 700111 HCHG RX REV CODE 636 W/ 250 OVERRIDE (IP): Performed by: INTERNAL MEDICINE

## 2017-09-23 PROCEDURE — 36415 COLL VENOUS BLD VENIPUNCTURE: CPT

## 2017-09-23 RX ORDER — DIPHENHYDRAMINE HCL 25 MG
25 TABLET ORAL ONCE
Status: COMPLETED | OUTPATIENT
Start: 2017-09-23 | End: 2017-09-23

## 2017-09-23 RX ORDER — ACETAMINOPHEN 325 MG/1
650 TABLET ORAL ONCE
Status: COMPLETED | OUTPATIENT
Start: 2017-09-23 | End: 2017-09-23

## 2017-09-23 RX ADMIN — IRON DEXTRAN 25 MG: 50 INJECTION INTRAMUSCULAR; INTRAVENOUS at 09:49

## 2017-09-23 RX ADMIN — ACETAMINOPHEN 650 MG: 325 TABLET, FILM COATED ORAL at 09:24

## 2017-09-23 RX ADMIN — IRON DEXTRAN 1250 MG: 50 INJECTION INTRAMUSCULAR; INTRAVENOUS at 11:38

## 2017-09-23 RX ADMIN — DIPHENHYDRAMINE HCL 25 MG: 25 TABLET ORAL at 09:24

## 2017-09-23 ASSESSMENT — PAIN SCALES - GENERAL: PAINLEVEL: NO PAIN

## 2017-09-23 NOTE — PROGRESS NOTES
Pt presented to infusion center for Iron Dextran. Pt has received before and is familiar with POC and time of treatment. PIV started, brisk blood return observed. Iron studies drawn per protocol. Pre-meds given as ordered. Test dose given, 1 hour obs completed with no s/s of adverse reaction. Remainder of iron dose infused with no s/s of adverse reaction. PIV flushed and removed, gauze dressing placed. Pt does not need any more appts at this time. Left on foot in NAD in good spirits.

## 2017-11-28 DIAGNOSIS — E78.5 HYPERLIPIDEMIA LDL GOAL <130: ICD-10-CM

## 2017-11-28 DIAGNOSIS — E55.9 VITAMIN D DEFICIENCY: ICD-10-CM

## 2017-11-28 DIAGNOSIS — E03.9 ACQUIRED HYPOTHYROIDISM: ICD-10-CM

## 2017-11-28 DIAGNOSIS — I10 ESSENTIAL HYPERTENSION: ICD-10-CM

## 2017-11-28 DIAGNOSIS — E03.8 OTHER SPECIFIED HYPOTHYROIDISM: ICD-10-CM

## 2017-11-28 RX ORDER — TRIAMTERENE AND HYDROCHLOROTHIAZIDE 37.5; 25 MG/1; MG/1
1 CAPSULE ORAL EVERY MORNING
Qty: 30 CAP | Refills: 0 | Status: SHIPPED | OUTPATIENT
Start: 2017-11-28 | End: 2017-12-14 | Stop reason: SDUPTHER

## 2017-11-28 RX ORDER — LEVOTHYROXINE SODIUM 0.12 MG/1
125 TABLET ORAL
Qty: 30 TAB | Refills: 0 | Status: SHIPPED | OUTPATIENT
Start: 2017-11-28 | End: 2017-12-14 | Stop reason: SDUPTHER

## 2017-11-28 NOTE — LETTER
November 29, 2017        Toyin Gallagher  [address]  [place] [address]        Dear Toyin:    We have received a request from your pharmacy to refill your prescription(s). After careful review of your chart, we have noted you are due for labs and a follow up appointment.  We request you call our office at [phone] at your earliest convenience and make an appointment. I have included a fasting lab order for you.    Prescribed medications provided needed treatments for our patients. However, when patients are not followed closely by their physician, potential medication complications may go unadressed. We look forward to scheduling an appointment for you, so that we may provide you with the safest and most complete medical care.      If you have any questions or concerns, please don't hesitate to call.        Sincerely,        DANIELLE Thornton.    Electronically Signed

## 2017-12-06 ENCOUNTER — HOSPITAL ENCOUNTER (OUTPATIENT)
Dept: LAB | Facility: MEDICAL CENTER | Age: 52
End: 2017-12-06
Attending: NURSE PRACTITIONER
Payer: COMMERCIAL

## 2017-12-06 DIAGNOSIS — I10 ESSENTIAL HYPERTENSION: ICD-10-CM

## 2017-12-06 DIAGNOSIS — E78.5 HYPERLIPIDEMIA LDL GOAL <130: ICD-10-CM

## 2017-12-06 DIAGNOSIS — E55.9 VITAMIN D DEFICIENCY: ICD-10-CM

## 2017-12-06 DIAGNOSIS — E03.9 ACQUIRED HYPOTHYROIDISM: ICD-10-CM

## 2017-12-06 LAB
25(OH)D3 SERPL-MCNC: 23 NG/ML (ref 30–100)
ALBUMIN SERPL BCP-MCNC: 3.9 G/DL (ref 3.2–4.9)
ALBUMIN/GLOB SERPL: 1.3 G/DL
ALP SERPL-CCNC: 54 U/L (ref 30–99)
ALT SERPL-CCNC: 12 U/L (ref 2–50)
ANION GAP SERPL CALC-SCNC: 5 MMOL/L (ref 0–11.9)
AST SERPL-CCNC: 15 U/L (ref 12–45)
BILIRUB SERPL-MCNC: 0.6 MG/DL (ref 0.1–1.5)
BUN SERPL-MCNC: 13 MG/DL (ref 8–22)
CALCIUM SERPL-MCNC: 9.3 MG/DL (ref 8.5–10.5)
CHLORIDE SERPL-SCNC: 102 MMOL/L (ref 96–112)
CHOLEST SERPL-MCNC: 220 MG/DL (ref 100–199)
CO2 SERPL-SCNC: 30 MMOL/L (ref 20–33)
CREAT SERPL-MCNC: 0.71 MG/DL (ref 0.5–1.4)
CREAT UR-MCNC: 98.2 MG/DL
GFR SERPL CREATININE-BSD FRML MDRD: >60 ML/MIN/1.73 M 2
GLOBULIN SER CALC-MCNC: 2.9 G/DL (ref 1.9–3.5)
GLUCOSE SERPL-MCNC: 83 MG/DL (ref 65–99)
HDLC SERPL-MCNC: 43 MG/DL
LDLC SERPL CALC-MCNC: 143 MG/DL
MICROALBUMIN UR-MCNC: <0.7 MG/DL
MICROALBUMIN/CREAT UR: NORMAL MG/G (ref 0–30)
POTASSIUM SERPL-SCNC: 3.5 MMOL/L (ref 3.6–5.5)
PROT SERPL-MCNC: 6.8 G/DL (ref 6–8.2)
SODIUM SERPL-SCNC: 137 MMOL/L (ref 135–145)
T4 FREE SERPL-MCNC: 0.96 NG/DL (ref 0.53–1.43)
TRIGL SERPL-MCNC: 171 MG/DL (ref 0–149)
TSH SERPL DL<=0.005 MIU/L-ACNC: 5.02 UIU/ML (ref 0.3–3.7)

## 2017-12-06 PROCEDURE — 80061 LIPID PANEL: CPT

## 2017-12-06 PROCEDURE — 36415 COLL VENOUS BLD VENIPUNCTURE: CPT

## 2017-12-06 PROCEDURE — 80053 COMPREHEN METABOLIC PANEL: CPT

## 2017-12-06 PROCEDURE — 82306 VITAMIN D 25 HYDROXY: CPT

## 2017-12-06 PROCEDURE — 82570 ASSAY OF URINE CREATININE: CPT

## 2017-12-06 PROCEDURE — 84443 ASSAY THYROID STIM HORMONE: CPT

## 2017-12-06 PROCEDURE — 82043 UR ALBUMIN QUANTITATIVE: CPT

## 2017-12-06 PROCEDURE — 84439 ASSAY OF FREE THYROXINE: CPT

## 2017-12-14 ENCOUNTER — OFFICE VISIT (OUTPATIENT)
Dept: MEDICAL GROUP | Facility: MEDICAL CENTER | Age: 52
End: 2017-12-14
Payer: COMMERCIAL

## 2017-12-14 VITALS
DIASTOLIC BLOOD PRESSURE: 78 MMHG | HEIGHT: 63 IN | HEART RATE: 67 BPM | SYSTOLIC BLOOD PRESSURE: 108 MMHG | TEMPERATURE: 97.3 F | WEIGHT: 212 LBS | OXYGEN SATURATION: 99 % | BODY MASS INDEX: 37.56 KG/M2

## 2017-12-14 DIAGNOSIS — Z00.00 PHYSICAL EXAM, ANNUAL: ICD-10-CM

## 2017-12-14 DIAGNOSIS — E78.5 HYPERLIPIDEMIA LDL GOAL <130: ICD-10-CM

## 2017-12-14 DIAGNOSIS — E03.8 OTHER SPECIFIED HYPOTHYROIDISM: ICD-10-CM

## 2017-12-14 DIAGNOSIS — E66.9 OBESITY (BMI 30-39.9): ICD-10-CM

## 2017-12-14 DIAGNOSIS — R94.6 ABNORMAL THYROID FUNCTION TEST: ICD-10-CM

## 2017-12-14 DIAGNOSIS — Z12.31 ENCOUNTER FOR SCREENING MAMMOGRAM FOR MALIGNANT NEOPLASM OF BREAST: ICD-10-CM

## 2017-12-14 DIAGNOSIS — E55.9 VITAMIN D DEFICIENCY: ICD-10-CM

## 2017-12-14 PROCEDURE — 99396 PREV VISIT EST AGE 40-64: CPT | Performed by: NURSE PRACTITIONER

## 2017-12-14 RX ORDER — TRIAMTERENE AND HYDROCHLOROTHIAZIDE 37.5; 25 MG/1; MG/1
1 CAPSULE ORAL EVERY MORNING
Qty: 90 CAP | Refills: 0 | Status: SHIPPED | OUTPATIENT
Start: 2017-12-14 | End: 2018-03-23 | Stop reason: SDUPTHER

## 2017-12-14 RX ORDER — ERGOCALCIFEROL 1.25 MG/1
50000 CAPSULE ORAL
Qty: 12 CAP | Refills: 0 | Status: SHIPPED | OUTPATIENT
Start: 2017-12-14 | End: 2019-01-30 | Stop reason: SDUPTHER

## 2017-12-14 RX ORDER — LEVOTHYROXINE SODIUM 0.12 MG/1
125 TABLET ORAL
Qty: 90 TAB | Refills: 0 | Status: SHIPPED | OUTPATIENT
Start: 2017-12-14 | End: 2018-03-23 | Stop reason: SDUPTHER

## 2017-12-14 ASSESSMENT — PATIENT HEALTH QUESTIONNAIRE - PHQ9: CLINICAL INTERPRETATION OF PHQ2 SCORE: 0

## 2017-12-14 NOTE — PROGRESS NOTES
Subjective:     Toyin Gallagher is a 52 y.o. female who presents with   Chief Complaint   Patient presents with   • Annual Exam   .    HPI:   Seen in f/u for PE.  Feeling well.  She is due mammo in 2/18.   She needs refills on meds.   Reviewed lab wtih pt.  Her GFR, alb/cr ratio, CMP, T4 is wnl  Vitamin d is low ag 23.  Not on otc supplement.  TSH is high.  Not taking synthroid daily  LP is not as good as last time.  trg are up to 171.  HDL is low ag 43.  LDL is high at 147 with goal of <100.  She did start healthy diet in july.   Exercising regularly.         Patient Active Problem List    Diagnosis Date Noted   • Obesity (BMI 30-39.9) 12/14/2017   • Chronic cholecystitis 01/13/2017   • Iron deficiency anemia 11/30/2016   • Thrombocythemia (CMS-HCC) 11/30/2016   • Obesity (BMI 35.0-39.9 without comorbidity) 11/21/2016   • Hyperlipidemia LDL goal <130    • Vitamin D deficiency    • Hypothyroidism    • Hypertension        Current medicines (including changes today)  Current Outpatient Prescriptions   Medication Sig Dispense Refill   • triamterene/hctz (MAXZIDE-25/DYAZIDE) 37.5-25 MG Cap Take 1 Cap by mouth every morning. 90 Cap 0   • levothyroxine (SYNTHROID) 125 MCG Tab Take 1 Tab by mouth Every morning on an empty stomach. 90 Tab 0   • vitamin D, Ergocalciferol, (DRISDOL) 25695 units Cap capsule Take 1 Cap by mouth every 7 days. 12 Cap 0     No current facility-administered medications for this visit.        No Known Allergies    ROS  Review of Systems   Constitutional: Negative.  Negative for fever, chills, weight loss, malaise/fatigue and diaphoresis.   HENT: Negative.  Negative for hearing loss, ear pain, nosebleeds, congestion, sore throat, neck pain, tinnitus and ear discharge.    Eyes: Negative.  Negative for blurred vision, double vision, photophobia, pain, discharge and redness.   Respiratory: Negative.  Negative for cough, hemoptysis, sputum production, shortness of breath, wheezing and stridor.   "  Cardiovascular: Negative.  Negative for chest pain, palpitations, orthopnea, claudication, leg swelling and PND.   Gastrointestinal: Negative.  Negative for heartburn, nausea, vomiting, abdominal pain, diarrhea, constipation, blood in stool and melena.   Genitourinary: Negative.  Negative for dysuria, urgency, frequency, incontinence, hematuria and flank pain.   Musculoskeletal: Negative.  Negative for myalgias, back pain, and falls. having from left arm and shoulder  Skin: Negative.  Negative for itching and rash.   Neurological: Negative.  Negative for dizziness, tingling, tremors, sensory change, speech change, focal weakness, seizures, loss of consciousness, weakness and headaches.   Endo/Heme/Allergies: Negative.  Negative for environmental allergies and polydipsia. Does not bruise/bleed easily.   Psychiatric/Behavioral: Negative.  Negative for depression, suicidal ideas, hallucinations, memory loss and substance abuse. The patient is not nervous/anxious and does not have insomnia.    All other systems reviewed and are negative.         Objective:     Blood pressure 108/78, pulse 67, temperature 36.3 °C (97.3 °F), height 1.6 m (5' 3\"), weight 96.2 kg (212 lb), last menstrual period 11/10/2017, SpO2 99 %, not currently breastfeeding. Body mass index is 37.55 kg/m².    Physical Exam:  Vitals reviewed.  Constitutional: Oriented to person, place, and time. appears well-developed and well-nourished. No distress.   Cardiovascular: Normal rate, regular rhythm, normal heart sounds and intact distal pulses. Exam reveals no gallop and no friction rub. No murmur heard. No carotid bruits.   Pulmonary/Chest: Effort normal and breath sounds normal. No stridor. No respiratory distress. no wheezes or rales. exhibits no tenderness.   Musculoskeletal: Normal range of motion. exhibits no edema. chasidy pedal pulses 2+.  Lymphadenopathy: No cervical or supraclavicular adenopathy.   Neurological: Alert and oriented to person, place, " and time. exhibits normal muscle tone.  Skin: Skin is warm and dry. No diaphoresis.   Psychiatric: Normal mood and affect. Behavior is normal.      Assessment and Plan:     The following treatment plan was discussed:    1. Physical exam, annual     2. Hyperlipidemia LDL goal <130  triamterene/hctz (MAXZIDE-25/DYAZIDE) 37.5-25 MG Cap    LIPID PROFILE    not as good as last time.  improve diet.  recheck LP in 2 months.  f/u with pt with results and yearly or sooner if lab abn   3. Vitamin D deficiency  vitamin D, Ergocalciferol, (DRISDOL) 29869 units Cap capsule    ergocalciferol x 12 weeks then d3 2000 units dialy   4. Other specified hypothyroidism  levothyroxine (SYNTHROID) 125 MCG Tab    TSH    FREE THYROXINE    needs refills on all meds.  not taking daily.  will restart.  recheck lab in 2 months.  f/u with pt with results.    5. Abnormal thyroid function test  TSH    FREE THYROXINE   6. Obesity (BMI 30-39.9)  Patient identified as having weight management issue.  Appropriate orders and counseling given.   7. Encounter for screening mammogram for malignant neoplasm of breast  MA-SCREEN MAMMO W/CAD-BILAT         Followup: Return in about 1 year (around 12/14/2018).

## 2018-02-16 ENCOUNTER — HOSPITAL ENCOUNTER (OUTPATIENT)
Dept: RADIOLOGY | Facility: MEDICAL CENTER | Age: 53
End: 2018-02-16
Attending: NURSE PRACTITIONER
Payer: COMMERCIAL

## 2018-02-16 DIAGNOSIS — Z12.31 ENCOUNTER FOR SCREENING MAMMOGRAM FOR MALIGNANT NEOPLASM OF BREAST: ICD-10-CM

## 2018-02-16 PROCEDURE — 77067 SCR MAMMO BI INCL CAD: CPT

## 2018-02-20 ENCOUNTER — TELEPHONE (OUTPATIENT)
Dept: MEDICAL GROUP | Facility: MEDICAL CENTER | Age: 53
End: 2018-02-20

## 2018-02-20 DIAGNOSIS — N63.0 BREAST NODULE: ICD-10-CM

## 2018-02-21 ENCOUNTER — HOSPITAL ENCOUNTER (OUTPATIENT)
Dept: LAB | Facility: MEDICAL CENTER | Age: 53
End: 2018-02-21
Attending: NURSE PRACTITIONER
Payer: COMMERCIAL

## 2018-02-21 ENCOUNTER — HOSPITAL ENCOUNTER (OUTPATIENT)
Dept: LAB | Facility: MEDICAL CENTER | Age: 53
End: 2018-02-21
Attending: INTERNAL MEDICINE
Payer: COMMERCIAL

## 2018-02-21 DIAGNOSIS — E78.5 HYPERLIPIDEMIA LDL GOAL <130: ICD-10-CM

## 2018-02-21 DIAGNOSIS — E03.8 OTHER SPECIFIED HYPOTHYROIDISM: ICD-10-CM

## 2018-02-21 DIAGNOSIS — R94.6 ABNORMAL THYROID FUNCTION TEST: ICD-10-CM

## 2018-02-21 LAB
BASOPHILS # BLD AUTO: 0.8 % (ref 0–1.8)
BASOPHILS # BLD: 0.05 K/UL (ref 0–0.12)
CHOLEST SERPL-MCNC: 188 MG/DL (ref 100–199)
EOSINOPHIL # BLD AUTO: 0.07 K/UL (ref 0–0.51)
EOSINOPHIL NFR BLD: 1.1 % (ref 0–6.9)
ERYTHROCYTE [DISTWIDTH] IN BLOOD BY AUTOMATED COUNT: 45.4 FL (ref 35.9–50)
FERRITIN SERPL-MCNC: 7.4 NG/ML (ref 10–291)
HCT VFR BLD AUTO: 41.8 % (ref 37–47)
HDLC SERPL-MCNC: 45 MG/DL
HGB BLD-MCNC: 13.4 G/DL (ref 12–16)
IMM GRANULOCYTES # BLD AUTO: 0.01 K/UL (ref 0–0.11)
IMM GRANULOCYTES NFR BLD AUTO: 0.2 % (ref 0–0.9)
LDLC SERPL CALC-MCNC: 100 MG/DL
LYMPHOCYTES # BLD AUTO: 1.44 K/UL (ref 1–4.8)
LYMPHOCYTES NFR BLD: 23.2 % (ref 22–41)
MCH RBC QN AUTO: 28 PG (ref 27–33)
MCHC RBC AUTO-ENTMCNC: 32.1 G/DL (ref 33.6–35)
MCV RBC AUTO: 87.4 FL (ref 81.4–97.8)
MONOCYTES # BLD AUTO: 0.55 K/UL (ref 0–0.85)
MONOCYTES NFR BLD AUTO: 8.9 % (ref 0–13.4)
NEUTROPHILS # BLD AUTO: 4.08 K/UL (ref 2–7.15)
NEUTROPHILS NFR BLD: 65.8 % (ref 44–72)
NRBC # BLD AUTO: 0 K/UL
NRBC BLD-RTO: 0 /100 WBC
PLATELET # BLD AUTO: 430 K/UL (ref 164–446)
PMV BLD AUTO: 10.2 FL (ref 9–12.9)
RBC # BLD AUTO: 4.78 M/UL (ref 4.2–5.4)
T4 FREE SERPL-MCNC: 1.13 NG/DL (ref 0.53–1.43)
TRIGL SERPL-MCNC: 214 MG/DL (ref 0–149)
TSH SERPL DL<=0.005 MIU/L-ACNC: 2.48 UIU/ML (ref 0.38–5.33)
WBC # BLD AUTO: 6.2 K/UL (ref 4.8–10.8)

## 2018-02-21 PROCEDURE — 84443 ASSAY THYROID STIM HORMONE: CPT

## 2018-02-21 PROCEDURE — 82728 ASSAY OF FERRITIN: CPT

## 2018-02-21 PROCEDURE — 36415 COLL VENOUS BLD VENIPUNCTURE: CPT

## 2018-02-21 PROCEDURE — 84439 ASSAY OF FREE THYROXINE: CPT

## 2018-02-21 PROCEDURE — 80061 LIPID PANEL: CPT

## 2018-02-21 PROCEDURE — 85025 COMPLETE CBC W/AUTO DIFF WBC: CPT

## 2018-02-22 ENCOUNTER — TELEPHONE (OUTPATIENT)
Dept: MEDICAL GROUP | Facility: MEDICAL CENTER | Age: 53
End: 2018-02-22

## 2018-02-22 NOTE — TELEPHONE ENCOUNTER
Please let pt know that the thyroid lab is now wnl.  Chol shows trg are worse. Dec complex carbs in diet.  LDL is much better.

## 2018-02-23 ENCOUNTER — HOSPITAL ENCOUNTER (OUTPATIENT)
Dept: RADIOLOGY | Facility: MEDICAL CENTER | Age: 53
End: 2018-02-23
Attending: NURSE PRACTITIONER
Payer: COMMERCIAL

## 2018-02-23 DIAGNOSIS — N63.0 BREAST NODULE: ICD-10-CM

## 2018-02-23 DIAGNOSIS — R92.1 BREAST CALCIFICATION, LEFT: ICD-10-CM

## 2018-02-23 PROCEDURE — 76642 ULTRASOUND BREAST LIMITED: CPT | Mod: RT

## 2018-02-23 PROCEDURE — 77065 DX MAMMO INCL CAD UNI: CPT | Mod: LT

## 2018-02-26 ENCOUNTER — TELEPHONE (OUTPATIENT)
Dept: MEDICAL GROUP | Facility: MEDICAL CENTER | Age: 53
End: 2018-02-26

## 2018-02-28 ENCOUNTER — OFFICE VISIT (OUTPATIENT)
Dept: HEMATOLOGY ONCOLOGY | Facility: MEDICAL CENTER | Age: 53
End: 2018-02-28
Payer: COMMERCIAL

## 2018-02-28 VITALS
WEIGHT: 213.29 LBS | OXYGEN SATURATION: 97 % | HEART RATE: 60 BPM | DIASTOLIC BLOOD PRESSURE: 70 MMHG | TEMPERATURE: 98.1 F | HEIGHT: 63 IN | SYSTOLIC BLOOD PRESSURE: 102 MMHG | RESPIRATION RATE: 16 BRPM | BODY MASS INDEX: 37.79 KG/M2

## 2018-02-28 DIAGNOSIS — D50.0 IRON DEFICIENCY ANEMIA DUE TO CHRONIC BLOOD LOSS: ICD-10-CM

## 2018-02-28 PROCEDURE — 99213 OFFICE O/P EST LOW 20 MIN: CPT | Performed by: INTERNAL MEDICINE

## 2018-02-28 ASSESSMENT — PAIN SCALES - GENERAL: PAINLEVEL: NO PAIN
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
## 2018-03-13 ENCOUNTER — HOSPITAL ENCOUNTER (OUTPATIENT)
Dept: RADIOLOGY | Facility: MEDICAL CENTER | Age: 53
End: 2018-03-13
Attending: NURSE PRACTITIONER
Payer: COMMERCIAL

## 2018-03-13 DIAGNOSIS — R92.8 ABNORMAL FINDING ON BREAST IMAGING: ICD-10-CM

## 2018-03-13 PROCEDURE — 88305 TISSUE EXAM BY PATHOLOGIST: CPT

## 2018-03-13 PROCEDURE — 19083 BX BREAST 1ST LESION US IMAG: CPT

## 2018-03-14 ENCOUNTER — TELEPHONE (OUTPATIENT)
Dept: RADIOLOGY | Facility: MEDICAL CENTER | Age: 53
End: 2018-03-14

## 2018-03-15 ENCOUNTER — TELEPHONE (OUTPATIENT)
Dept: MEDICAL GROUP | Facility: MEDICAL CENTER | Age: 53
End: 2018-03-15

## 2018-03-15 NOTE — TELEPHONE ENCOUNTER
Please check with pt and see if mammo radiology referred her to a breast surgeon for eval or does she need us to do it.

## 2018-03-15 NOTE — LETTER
March 21, 2018        Toyin Gallagher  [address]  [place] [address]        Dear Toyin:    Lisette Neville's office has tried contacting you. At your earliest convenience please contact our office at [phone].      If you have any questions or concerns, please don't hesitate to call.        Sincerely,        Lisette Neville, DANIELLE.    Electronically Signed

## 2018-03-21 NOTE — TELEPHONE ENCOUNTER
Pt returned call and notified she is not willing to have a surgical consultation at this time. Pt states she did discuss this with the Radiologist and is not concerned at this time.

## 2018-03-23 DIAGNOSIS — E78.5 HYPERLIPIDEMIA LDL GOAL <130: ICD-10-CM

## 2018-03-23 DIAGNOSIS — E03.8 OTHER SPECIFIED HYPOTHYROIDISM: ICD-10-CM

## 2018-03-25 RX ORDER — TRIAMTERENE AND HYDROCHLOROTHIAZIDE 37.5; 25 MG/1; MG/1
CAPSULE ORAL
Qty: 90 CAP | Refills: 2 | Status: SHIPPED | OUTPATIENT
Start: 2018-03-25 | End: 2019-01-30 | Stop reason: SDUPTHER

## 2018-03-25 RX ORDER — LEVOTHYROXINE SODIUM 0.12 MG/1
TABLET ORAL
Qty: 90 TAB | Refills: 2 | Status: SHIPPED | OUTPATIENT
Start: 2018-03-25 | End: 2018-12-02 | Stop reason: SDUPTHER

## 2018-03-28 ENCOUNTER — OFFICE VISIT (OUTPATIENT)
Dept: HEMATOLOGY ONCOLOGY | Facility: MEDICAL CENTER | Age: 53
End: 2018-03-28
Payer: COMMERCIAL

## 2018-03-28 VITALS
TEMPERATURE: 97.6 F | WEIGHT: 213.96 LBS | HEIGHT: 63 IN | HEART RATE: 58 BPM | DIASTOLIC BLOOD PRESSURE: 64 MMHG | SYSTOLIC BLOOD PRESSURE: 108 MMHG | OXYGEN SATURATION: 99 % | RESPIRATION RATE: 16 BRPM | BODY MASS INDEX: 37.91 KG/M2

## 2018-03-28 DIAGNOSIS — D50.0 IRON DEFICIENCY ANEMIA DUE TO CHRONIC BLOOD LOSS: ICD-10-CM

## 2018-03-28 DIAGNOSIS — N64.89 PSEUDOANGIOMATOUS STROMAL HYPERPLASIA OF BREAST: ICD-10-CM

## 2018-03-28 PROCEDURE — 99214 OFFICE O/P EST MOD 30 MIN: CPT | Performed by: INTERNAL MEDICINE

## 2018-03-28 ASSESSMENT — PAIN SCALES - GENERAL: PAINLEVEL: NO PAIN
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
## 2018-04-02 ENCOUNTER — HOSPITAL ENCOUNTER (OUTPATIENT)
Dept: RADIOLOGY | Facility: MEDICAL CENTER | Age: 53
End: 2018-04-02
Attending: NURSE PRACTITIONER
Payer: COMMERCIAL

## 2018-04-02 ENCOUNTER — OFFICE VISIT (OUTPATIENT)
Dept: URGENT CARE | Facility: CLINIC | Age: 53
End: 2018-04-02
Payer: COMMERCIAL

## 2018-04-02 VITALS
BODY MASS INDEX: 38.45 KG/M2 | TEMPERATURE: 98.6 F | OXYGEN SATURATION: 100 % | HEIGHT: 63 IN | DIASTOLIC BLOOD PRESSURE: 68 MMHG | WEIGHT: 217 LBS | SYSTOLIC BLOOD PRESSURE: 122 MMHG | HEART RATE: 61 BPM | RESPIRATION RATE: 16 BRPM

## 2018-04-02 DIAGNOSIS — R10.32 ABDOMINAL CRAMPING, BILATERAL LOWER QUADRANT: ICD-10-CM

## 2018-04-02 DIAGNOSIS — N94.6 DYSMENORRHEA: ICD-10-CM

## 2018-04-02 DIAGNOSIS — R10.31 ABDOMINAL CRAMPING, BILATERAL LOWER QUADRANT: ICD-10-CM

## 2018-04-02 DIAGNOSIS — D25.9 UTERINE LEIOMYOMA, UNSPECIFIED LOCATION: ICD-10-CM

## 2018-04-02 LAB
APPEARANCE UR: CLEAR
BILIRUB UR STRIP-MCNC: NORMAL MG/DL
COLOR UR AUTO: YELLOW
GLUCOSE UR STRIP.AUTO-MCNC: NORMAL MG/DL
KETONES UR STRIP.AUTO-MCNC: NORMAL MG/DL
LEUKOCYTE ESTERASE UR QL STRIP.AUTO: NORMAL
NITRITE UR QL STRIP.AUTO: NORMAL
PH UR STRIP.AUTO: 6 [PH] (ref 5–8)
PROT UR QL STRIP: NORMAL MG/DL
RBC UR QL AUTO: NORMAL
SP GR UR STRIP.AUTO: 1.01
UROBILINOGEN UR STRIP-MCNC: NORMAL MG/DL

## 2018-04-02 PROCEDURE — 76830 TRANSVAGINAL US NON-OB: CPT

## 2018-04-02 PROCEDURE — 99203 OFFICE O/P NEW LOW 30 MIN: CPT | Performed by: NURSE PRACTITIONER

## 2018-04-02 PROCEDURE — 81002 URINALYSIS NONAUTO W/O SCOPE: CPT | Performed by: NURSE PRACTITIONER

## 2018-04-02 ASSESSMENT — ENCOUNTER SYMPTOMS
DIZZINESS: 0
DIARRHEA: 1
CHILLS: 0
ARTHRALGIAS: 0
SHORTNESS OF BREATH: 0
HEMATOCHEZIA: 0
ABDOMINAL PAIN: 1
FLATUS: 0
NAUSEA: 0
ANOREXIA: 0
VOMITING: 0
EYE PAIN: 0
FEVER: 0
MYALGIAS: 0
SORE THROAT: 0
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
## 2018-04-03 ENCOUNTER — TELEPHONE (OUTPATIENT)
Dept: OBGYN | Facility: MEDICAL CENTER | Age: 53
End: 2018-04-03

## 2018-04-03 NOTE — TELEPHONE ENCOUNTER
Patient has been in a lot of pain lately so the patient went to urgent care and they done an transvaginal US. They told the patient that the fibroids are quite large. Pt would like to know what to do about her pain? Please advise doctor? Thank you

## 2018-04-04 NOTE — TELEPHONE ENCOUNTER
Called and informed the patient she needs to schedule an appointment with Dr. Durham so she can do an EMB. Patient transferred up front to be scheduled with Deedee.

## 2018-04-26 ENCOUNTER — PATIENT OUTREACH (OUTPATIENT)
Dept: HEALTH INFORMATION MANAGEMENT | Facility: OTHER | Age: 53
End: 2018-04-26

## 2018-04-26 ENCOUNTER — HOSPITAL ENCOUNTER (EMERGENCY)
Facility: MEDICAL CENTER | Age: 53
End: 2018-04-26
Attending: EMERGENCY MEDICINE
Payer: COMMERCIAL

## 2018-04-26 VITALS
HEIGHT: 63 IN | WEIGHT: 218.92 LBS | SYSTOLIC BLOOD PRESSURE: 120 MMHG | OXYGEN SATURATION: 98 % | RESPIRATION RATE: 16 BRPM | HEART RATE: 70 BPM | TEMPERATURE: 97.8 F | BODY MASS INDEX: 38.79 KG/M2 | DIASTOLIC BLOOD PRESSURE: 81 MMHG

## 2018-04-26 DIAGNOSIS — N95.1 PERIMENOPAUSE: ICD-10-CM

## 2018-04-26 DIAGNOSIS — N92.1 MENOMETRORRHAGIA: ICD-10-CM

## 2018-04-26 LAB
ANION GAP SERPL CALC-SCNC: 6 MMOL/L (ref 0–11.9)
BASOPHILS # BLD AUTO: 0.8 % (ref 0–1.8)
BASOPHILS # BLD: 0.06 K/UL (ref 0–0.12)
BUN SERPL-MCNC: 19 MG/DL (ref 8–22)
CALCIUM SERPL-MCNC: 8.5 MG/DL (ref 8.4–10.2)
CHLORIDE SERPL-SCNC: 104 MMOL/L (ref 96–112)
CO2 SERPL-SCNC: 25 MMOL/L (ref 20–33)
CREAT SERPL-MCNC: 0.78 MG/DL (ref 0.5–1.4)
EOSINOPHIL # BLD AUTO: 0.22 K/UL (ref 0–0.51)
EOSINOPHIL NFR BLD: 3 % (ref 0–6.9)
ERYTHROCYTE [DISTWIDTH] IN BLOOD BY AUTOMATED COUNT: 40.2 FL (ref 35.9–50)
GLUCOSE SERPL-MCNC: 95 MG/DL (ref 65–99)
HCT VFR BLD AUTO: 31.3 % (ref 37–47)
HGB BLD-MCNC: 10 G/DL (ref 12–16)
IMM GRANULOCYTES # BLD AUTO: 0.02 K/UL (ref 0–0.11)
IMM GRANULOCYTES NFR BLD AUTO: 0.3 % (ref 0–0.9)
INR PPP: 1.04 (ref 0.87–1.13)
LYMPHOCYTES # BLD AUTO: 2.39 K/UL (ref 1–4.8)
LYMPHOCYTES NFR BLD: 33 % (ref 22–41)
MCH RBC QN AUTO: 27 PG (ref 27–33)
MCHC RBC AUTO-ENTMCNC: 31.9 G/DL (ref 33.6–35)
MCV RBC AUTO: 84.6 FL (ref 81.4–97.8)
MONOCYTES # BLD AUTO: 0.71 K/UL (ref 0–0.85)
MONOCYTES NFR BLD AUTO: 9.8 % (ref 0–13.4)
NEUTROPHILS # BLD AUTO: 3.84 K/UL (ref 2–7.15)
NEUTROPHILS NFR BLD: 53.1 % (ref 44–72)
NRBC # BLD AUTO: 0 K/UL
NRBC BLD-RTO: 0 /100 WBC
PLATELET # BLD AUTO: 532 K/UL (ref 164–446)
PMV BLD AUTO: 9.4 FL (ref 9–12.9)
POTASSIUM SERPL-SCNC: 3.3 MMOL/L (ref 3.6–5.5)
PROTHROMBIN TIME: 13.5 SEC (ref 12–14.6)
RBC # BLD AUTO: 3.7 M/UL (ref 4.2–5.4)
SODIUM SERPL-SCNC: 135 MMOL/L (ref 135–145)
WBC # BLD AUTO: 7.2 K/UL (ref 4.8–10.8)

## 2018-04-26 PROCEDURE — 80048 BASIC METABOLIC PNL TOTAL CA: CPT

## 2018-04-26 PROCEDURE — 85025 COMPLETE CBC W/AUTO DIFF WBC: CPT

## 2018-04-26 PROCEDURE — 99284 EMERGENCY DEPT VISIT MOD MDM: CPT

## 2018-04-26 PROCEDURE — 85610 PROTHROMBIN TIME: CPT

## 2018-04-26 PROCEDURE — 36415 COLL VENOUS BLD VENIPUNCTURE: CPT

## 2018-04-26 ASSESSMENT — ENCOUNTER SYMPTOMS
FEVER: 0
CHILLS: 0

## 2018-04-26 ASSESSMENT — PAIN SCALES - GENERAL: PAINLEVEL_OUTOF10: 0

## 2018-04-26 NOTE — ED NOTES
Pt had pelvic exam by MD and ED tech   MD re-evaluated pt and she is now cleared for d/c   dischg instructions given to pt  Verbally understands  D/c'ed to home in NAD aware to f/u w/ PCP this week

## 2018-04-26 NOTE — DISCHARGE INSTRUCTIONS
Dysfunctional Uterine Bleeding  Introduction  Dysfunctional uterine bleeding is abnormal bleeding from the uterus. Dysfunctional uterine bleeding includes:  · A period that comes earlier or later than usual.  · A period that is lighter, heavier, or has blood clots.  · Bleeding between periods.  · Skipping one or more periods.  · Bleeding after sexual intercourse.  · Bleeding after menopause.  Follow these instructions at home:  Pay attention to any changes in your symptoms. Follow these instructions to help with your condition:  Eating and drinking  · Eat well-balanced meals. Include foods that are high in iron, such as liver, meat, shellfish, green leafy vegetables, and eggs.  · If you become constipated:  ¨ Drink plenty of water.  ¨ Eat fruits and vegetables that are high in water and fiber, such as spinach, carrots, raspberries, apples, and karlene.  Medicines  · Take over-the-counter and prescription medicines only as told by your health care provider.  · Do not change medicines without talking with your health care provider.  · Aspirin or medicines that contain aspirin may make the bleeding worse. Do not take those medicines:  ¨ During the week before your period.  ¨ During your period.  · If you were prescribed iron pills, take them as told by your health care provider. Iron pills help to replace iron that your body loses because of this condition.  Activity  · If you need to change your sanitary pad or tampon more than one time every 2 hours:  ¨ Lie in bed with your feet raised (elevated).  ¨ Place a cold pack on your lower abdomen.  ¨ Rest as much as possible until the bleeding stops or slows down.  · Do not try to lose weight until the bleeding has stopped and your blood iron level is back to normal.  Other Instructions  · For two months, write down:  ¨ When your period starts.  ¨ When your period ends.  ¨ When any abnormal bleeding occurs.  ¨ What problems you notice.  · Keep all follow up visits as told by  your health care provider. This is important.  Contact a health care provider if:  · You get light-headed or weak.  · You have nausea and vomiting.  · You cannot eat or drink without vomiting.  · You feel dizzy or have diarrhea while you are taking medicines.  · You are taking birth control pills or hormones, and you want to change them or stop taking them.  Get help right away if:  · You develop a fever or chills.  · You need to change your sanitary pad or tampon more than one time per hour.  · Your bleeding becomes heavier, or your flow contains clots more often.  · You develop pain in your abdomen.  · You lose consciousness.  · You develop a rash.  This information is not intended to replace advice given to you by your health care provider. Make sure you discuss any questions you have with your health care provider.  Document Released: 12/15/2001 Document Revised: 05/25/2017 Document Reviewed: 03/14/2016  © 2017 [employer]vier

## 2018-04-26 NOTE — ED PROVIDER NOTES
ED Provider Note    CHIEF COMPLAINT  Chief Complaint   Patient presents with   • Vaginal Bleeding     Pt states starting at 10pm last night she is soaking through pads q5-10 minutes. Pt reports a hx of fibroids and heavy periods. Last period was reported to be last Wed, pt stated period had almost cleared when the bleeding began again last night       HPI  Toyin Gallagher is a 52 y.o. female who presents with vaginal bleeding. Patient continues to have periods. Last period was one week prior. Patient is having vaginal bleeding since that time around 5-10 pads a day. She denies any major pelvic pain or abdominal pain. She has a long-standing history of fibroids. Patient denies any dysuria or hematuria. Patient denies any history of anticoagulants or bleeding diatheses otherwise.    REVIEW OF SYSTEMS  Review of Systems   Constitutional: Negative for chills and fever.   Genitourinary: Negative for dysuria, frequency, hematuria and urgency.        See above     See HPI for further details. All other systems are negative.     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    SOCIAL HISTORY  Social History     Social History Main Topics   • Smoking status: Never Smoker   • Smokeless tobacco: Never Used   • Alcohol use No   • Drug use: No   • Sexual activity: Not on file       SURGICAL HISTORY   has a past surgical history that includes orif, femur; tibia orif; wrist orif (Right); and stoney by laparoscopy (1/13/2017).    CURRENT MEDICATIONS  Home Medications    **Home medications have not yet been reviewed for this encounter**         ALLERGIES  No Known Allergies    PHYSICAL EXAM  Physical Exam   Constitutional: She is oriented to person, place, and time. She appears well-developed and  well-nourished.   HENT:   Head: Normocephalic and atraumatic.   Eyes: Conjunctivae are normal. Pupils are equal, round, and reactive to light.   Neck: Normal range of motion. Neck supple.   Cardiovascular: Normal rate and regular rhythm.    Pulmonary/Chest: Effort normal and breath sounds normal. No respiratory distress. She has no wheezes. She has no rales.   Abdominal: Soft. Bowel sounds are normal. She exhibits no distension. There is no tenderness. There is no rebound and no guarding.   Genitourinary:   Genitourinary Comments: Clots in the vaginal vault, once these werecleared there is no active bleeding. Os is closed   Musculoskeletal: Normal range of motion.   Neurological: She is alert and oriented to person, place, and time.   Skin: Skin is warm and dry. No rash noted.     Results for orders placed or performed during the hospital encounter of 04/26/18   CBC WITH DIFFERENTIAL   Result Value Ref Range    WBC 7.2 4.8 - 10.8 K/uL    RBC 3.70 (L) 4.20 - 5.40 M/uL    Hemoglobin 10.0 (L) 12.0 - 16.0 g/dL    Hematocrit 31.3 (L) 37.0 - 47.0 %    MCV 84.6 81.4 - 97.8 fL    MCH 27.0 27.0 - 33.0 pg    MCHC 31.9 (L) 33.6 - 35.0 g/dL    RDW 40.2 35.9 - 50.0 fL    Platelet Count 532 (H) 164 - 446 K/uL    MPV 9.4 9.0 - 12.9 fL    Neutrophils-Polys 53.10 44.00 - 72.00 %    Lymphocytes 33.00 22.00 - 41.00 %    Monocytes 9.80 0.00 - 13.40 %    Eosinophils 3.00 0.00 - 6.90 %    Basophils 0.80 0.00 - 1.80 %    Immature Granulocytes 0.30 0.00 - 0.90 %    Nucleated RBC 0.00 /100 WBC    Neutrophils (Absolute) 3.84 2.00 - 7.15 K/uL    Lymphs (Absolute) 2.39 1.00 - 4.80 K/uL    Monos (Absolute) 0.71 0.00 - 0.85 K/uL    Eos (Absolute) 0.22 0.00 - 0.51 K/uL    Baso (Absolute) 0.06 0.00 - 0.12 K/uL    Immature Granulocytes (abs) 0.02 0.00 - 0.11 K/uL    NRBC (Absolute) 0.00 K/uL   BASIC METABOLIC PANEL   Result Value Ref Range    Sodium 135 135 - 145 mmol/L    Potassium 3.3 (L) 3.6 - 5.5 mmol/L    Chloride 104 96 - 112 mmol/L    Co2 25  20 - 33 mmol/L    Glucose 95 65 - 99 mg/dL    Bun 19 8 - 22 mg/dL    Creatinine 0.78 0.50 - 1.40 mg/dL    Calcium 8.5 8.4 - 10.2 mg/dL    Anion Gap 6.0 0.0 - 11.9   PT/INR   Result Value Ref Range    PT 13.5 12.0 - 14.6 sec    INR 1.04 0.87 - 1.13   ESTIMATED GFR   Result Value Ref Range    GFR If African American >60 >60 mL/min/1.73 m 2    GFR If Non African American >60 >60 mL/min/1.73 m 2     No orders to display         COURSE & MEDICAL DECISION MAKING  Pertinent Labs & Imaging studies reviewed. (See chart for details)  Patient with symptoms likely secondary to perimenopausal given her age. She has menometrorrhagia. She has a normal heart rate a benign abdominal exam and no current tachycardia.  Patient's hemoglobin is within normal limits.  Patient without any other symptoms to suggest any concurrent bleeding diatheses to suggest an occult coagulopathy. Her INR is unremarkable.  Given patient's age I do not believe that empirically starting high-dose estrogen for management is safe as started in the emergency department and therefore I have called our scheduling service and asked that patient be scheduled for an outpatient gynecology appointment within the next 24-48 hours. I discussed return precautions with patient.     The patient will return for worsening symptoms and is stable at the time of discharge. The patient verbalizes understanding and will comply.    FINAL IMPRESSION  1. Menometrorrhagia, perimenopause         Electronically signed by: Zain Smith, 4/26/2018 12:49 AM

## 2018-04-27 ENCOUNTER — TELEPHONE (OUTPATIENT)
Dept: OBGYN | Facility: MEDICAL CENTER | Age: 53
End: 2018-04-27

## 2018-04-27 DIAGNOSIS — D25.9 UTERINE LEIOMYOMA, UNSPECIFIED LOCATION: ICD-10-CM

## 2018-04-27 DIAGNOSIS — N93.9 ABNORMAL UTERINE BLEEDING (AUB): ICD-10-CM

## 2018-04-27 RX ORDER — TRANEXAMIC ACID 650 MG/1
1300 TABLET ORAL 3 TIMES DAILY
Qty: 30 TAB | Refills: 0 | Status: SHIPPED | OUTPATIENT
Start: 2018-04-27 | End: 2019-01-30

## 2018-05-03 ENCOUNTER — GYNECOLOGY VISIT (OUTPATIENT)
Dept: OBGYN | Facility: MEDICAL CENTER | Age: 53
End: 2018-05-03
Payer: COMMERCIAL

## 2018-05-03 ENCOUNTER — HOSPITAL ENCOUNTER (OUTPATIENT)
Facility: MEDICAL CENTER | Age: 53
End: 2018-05-03
Attending: OBSTETRICS & GYNECOLOGY
Payer: COMMERCIAL

## 2018-05-03 VITALS
HEIGHT: 63 IN | BODY MASS INDEX: 38.27 KG/M2 | SYSTOLIC BLOOD PRESSURE: 108 MMHG | DIASTOLIC BLOOD PRESSURE: 80 MMHG | WEIGHT: 216 LBS

## 2018-05-03 DIAGNOSIS — N93.9 ABNORMAL UTERINE BLEEDING (AUB): ICD-10-CM

## 2018-05-03 LAB
INT CON NEG: NEGATIVE
INT CON POS: POSITIVE
POC URINE PREGNANCY TEST: NEGATIVE

## 2018-05-03 PROCEDURE — 81025 URINE PREGNANCY TEST: CPT | Performed by: OBSTETRICS & GYNECOLOGY

## 2018-05-03 PROCEDURE — 88305 TISSUE EXAM BY PATHOLOGIST: CPT

## 2018-05-03 PROCEDURE — 58100 BIOPSY OF UTERUS LINING: CPT | Performed by: OBSTETRICS & GYNECOLOGY
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
## 2018-05-10 ENCOUNTER — APPOINTMENT (OUTPATIENT)
Dept: OBGYN | Facility: MEDICAL CENTER | Age: 53
End: 2018-05-10
Payer: COMMERCIAL

## 2018-09-05 ENCOUNTER — TELEPHONE (OUTPATIENT)
Dept: MEDICAL GROUP | Facility: MEDICAL CENTER | Age: 53
End: 2018-09-05

## 2018-09-05 DIAGNOSIS — N63.0 BREAST NODULE: ICD-10-CM

## 2018-09-05 NOTE — LETTER
September 12, 2018        Toyin Gallagher  [address]  [place] [address]        Dear Toyin:    Lisette Neville's office has tried contacting you. At your earliest convenience please contact our office at [phone].        If you have any questions or concerns, please don't hesitate to call.        Sincerely,        Lisette Neville, DANIELLE.    Electronically Signed

## 2018-09-27 ENCOUNTER — HOSPITAL ENCOUNTER (OUTPATIENT)
Dept: RADIOLOGY | Facility: MEDICAL CENTER | Age: 53
End: 2018-09-27
Attending: NURSE PRACTITIONER
Payer: COMMERCIAL

## 2018-09-27 DIAGNOSIS — Z98.890 STATUS POST BREAST BIOPSY: ICD-10-CM

## 2018-09-27 DIAGNOSIS — R92.1 BREAST CALCIFICATIONS: ICD-10-CM

## 2018-09-27 PROCEDURE — 76642 ULTRASOUND BREAST LIMITED: CPT | Mod: RT

## 2018-09-27 PROCEDURE — G0279 TOMOSYNTHESIS, MAMMO: HCPCS | Mod: LT

## 2018-10-24 ENCOUNTER — APPOINTMENT (OUTPATIENT)
Dept: HEMATOLOGY ONCOLOGY | Facility: MEDICAL CENTER | Age: 53
End: 2018-10-24
Payer: COMMERCIAL

## 2018-11-14 ENCOUNTER — TELEPHONE (OUTPATIENT)
Dept: HEMATOLOGY ONCOLOGY | Facility: MEDICAL CENTER | Age: 53
End: 2018-11-14

## 2018-11-14 DIAGNOSIS — D50.0 IRON DEFICIENCY ANEMIA DUE TO CHRONIC BLOOD LOSS: ICD-10-CM

## 2018-11-14 NOTE — TELEPHONE ENCOUNTER
Patient called stated that she has appointment tomorrow with Dr Gonzalez but doesn't have any lab orders place so she can get her labs done piror to tomorrow visit 11/15/18       FERRITIN   CBC With DIFF

## 2018-12-10 ENCOUNTER — HOSPITAL ENCOUNTER (OUTPATIENT)
Dept: LAB | Facility: MEDICAL CENTER | Age: 53
End: 2018-12-10
Attending: INTERNAL MEDICINE
Payer: COMMERCIAL

## 2018-12-10 DIAGNOSIS — D50.0 IRON DEFICIENCY ANEMIA DUE TO CHRONIC BLOOD LOSS: ICD-10-CM

## 2018-12-10 PROCEDURE — 82607 VITAMIN B-12: CPT

## 2018-12-10 PROCEDURE — 85025 COMPLETE CBC W/AUTO DIFF WBC: CPT

## 2018-12-10 PROCEDURE — 36415 COLL VENOUS BLD VENIPUNCTURE: CPT

## 2018-12-10 PROCEDURE — 82728 ASSAY OF FERRITIN: CPT

## 2018-12-11 LAB
ANISOCYTOSIS BLD QL SMEAR: ABNORMAL
BASOPHILS # BLD AUTO: 0.9 % (ref 0–1.8)
BASOPHILS # BLD: 0.06 K/UL (ref 0–0.12)
COMMENT 1642: NORMAL
EOSINOPHIL # BLD AUTO: 0.17 K/UL (ref 0–0.51)
EOSINOPHIL NFR BLD: 2.4 % (ref 0–6.9)
ERYTHROCYTE [DISTWIDTH] IN BLOOD BY AUTOMATED COUNT: 51.4 FL (ref 35.9–50)
FERRITIN SERPL-MCNC: 3.2 NG/ML (ref 10–291)
HCT VFR BLD AUTO: 36.2 % (ref 37–47)
HGB BLD-MCNC: 10.7 G/DL (ref 12–16)
IMM GRANULOCYTES # BLD AUTO: 0.02 K/UL (ref 0–0.11)
IMM GRANULOCYTES NFR BLD AUTO: 0.3 % (ref 0–0.9)
LYMPHOCYTES # BLD AUTO: 1.95 K/UL (ref 1–4.8)
LYMPHOCYTES NFR BLD: 28 % (ref 22–41)
MCH RBC QN AUTO: 22.1 PG (ref 27–33)
MCHC RBC AUTO-ENTMCNC: 29.6 G/DL (ref 33.6–35)
MCV RBC AUTO: 74.6 FL (ref 81.4–97.8)
MICROCYTES BLD QL SMEAR: ABNORMAL
MONOCYTES # BLD AUTO: 0.73 K/UL (ref 0–0.85)
MONOCYTES NFR BLD AUTO: 10.5 % (ref 0–13.4)
MORPHOLOGY BLD-IMP: NORMAL
NEUTROPHILS # BLD AUTO: 4.03 K/UL (ref 2–7.15)
NEUTROPHILS NFR BLD: 57.9 % (ref 44–72)
NRBC # BLD AUTO: 0 K/UL
NRBC BLD-RTO: 0 /100 WBC
PLATELET # BLD AUTO: 622 K/UL (ref 164–446)
PLATELET BLD QL SMEAR: NORMAL
PMV BLD AUTO: 10 FL (ref 9–12.9)
RBC # BLD AUTO: 4.85 M/UL (ref 4.2–5.4)
RBC BLD AUTO: PRESENT
VIT B12 SERPL-MCNC: 577 PG/ML (ref 211–911)
WBC # BLD AUTO: 7 K/UL (ref 4.8–10.8)

## 2018-12-18 ENCOUNTER — OFFICE VISIT (OUTPATIENT)
Dept: HEMATOLOGY ONCOLOGY | Facility: MEDICAL CENTER | Age: 53
End: 2018-12-18
Payer: COMMERCIAL

## 2018-12-18 VITALS
TEMPERATURE: 97.5 F | HEIGHT: 63 IN | DIASTOLIC BLOOD PRESSURE: 80 MMHG | HEART RATE: 63 BPM | SYSTOLIC BLOOD PRESSURE: 118 MMHG | OXYGEN SATURATION: 98 % | BODY MASS INDEX: 41.05 KG/M2 | WEIGHT: 231.7 LBS | RESPIRATION RATE: 18 BRPM

## 2018-12-18 DIAGNOSIS — N64.89 PSEUDOANGIOMATOUS STROMAL HYPERPLASIA OF BREAST: ICD-10-CM

## 2018-12-18 DIAGNOSIS — D50.0 IRON DEFICIENCY ANEMIA DUE TO CHRONIC BLOOD LOSS: ICD-10-CM

## 2018-12-18 PROCEDURE — 99214 OFFICE O/P EST MOD 30 MIN: CPT | Performed by: INTERNAL MEDICINE

## 2018-12-18 ASSESSMENT — PAIN SCALES - GENERAL: PAINLEVEL: NO PAIN
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
## 2019-01-06 DIAGNOSIS — E78.5 HYPERLIPIDEMIA LDL GOAL <130: ICD-10-CM

## 2019-01-06 DIAGNOSIS — I10 ESSENTIAL HYPERTENSION: ICD-10-CM

## 2019-01-06 DIAGNOSIS — E55.9 VITAMIN D DEFICIENCY: ICD-10-CM

## 2019-01-06 DIAGNOSIS — E03.8 OTHER SPECIFIED HYPOTHYROIDISM: ICD-10-CM

## 2019-01-07 RX ORDER — LEVOTHYROXINE SODIUM 0.12 MG/1
TABLET ORAL
Qty: 15 TAB | Refills: 0 | Status: SHIPPED | OUTPATIENT
Start: 2019-01-07 | End: 2019-01-30 | Stop reason: SDUPTHER

## 2019-01-09 ENCOUNTER — HOSPITAL ENCOUNTER (OUTPATIENT)
Dept: LAB | Facility: MEDICAL CENTER | Age: 54
End: 2019-01-09
Attending: NURSE PRACTITIONER
Payer: COMMERCIAL

## 2019-01-09 LAB
25(OH)D3 SERPL-MCNC: 10 NG/ML (ref 30–100)
ALBUMIN SERPL BCP-MCNC: 3.6 G/DL (ref 3.2–4.9)
ALBUMIN/GLOB SERPL: 1.1 G/DL
ALP SERPL-CCNC: 63 U/L (ref 30–99)
ALT SERPL-CCNC: 64 U/L (ref 2–50)
ANION GAP SERPL CALC-SCNC: 7 MMOL/L (ref 0–11.9)
AST SERPL-CCNC: 55 U/L (ref 12–45)
BILIRUB SERPL-MCNC: 0.4 MG/DL (ref 0.1–1.5)
BUN SERPL-MCNC: 13 MG/DL (ref 8–22)
CALCIUM SERPL-MCNC: 8.6 MG/DL (ref 8.5–10.5)
CHLORIDE SERPL-SCNC: 103 MMOL/L (ref 96–112)
CHOLEST SERPL-MCNC: 218 MG/DL (ref 100–199)
CO2 SERPL-SCNC: 28 MMOL/L (ref 20–33)
CREAT SERPL-MCNC: 0.75 MG/DL (ref 0.5–1.4)
CREAT UR-MCNC: 114.7 MG/DL
FASTING STATUS PATIENT QL REPORTED: NORMAL
GLOBULIN SER CALC-MCNC: 3.4 G/DL (ref 1.9–3.5)
GLUCOSE SERPL-MCNC: 89 MG/DL (ref 65–99)
HDLC SERPL-MCNC: 37 MG/DL
LDLC SERPL CALC-MCNC: 120 MG/DL
MICROALBUMIN UR-MCNC: <0.7 MG/DL
MICROALBUMIN/CREAT UR: NORMAL MG/G (ref 0–30)
POTASSIUM SERPL-SCNC: 3.9 MMOL/L (ref 3.6–5.5)
PROT SERPL-MCNC: 7 G/DL (ref 6–8.2)
SODIUM SERPL-SCNC: 138 MMOL/L (ref 135–145)
T4 FREE SERPL-MCNC: 0.92 NG/DL (ref 0.53–1.43)
TRIGL SERPL-MCNC: 304 MG/DL (ref 0–149)
TSH SERPL DL<=0.005 MIU/L-ACNC: 6.32 UIU/ML (ref 0.38–5.33)

## 2019-01-09 PROCEDURE — 84439 ASSAY OF FREE THYROXINE: CPT

## 2019-01-09 PROCEDURE — 80053 COMPREHEN METABOLIC PANEL: CPT

## 2019-01-09 PROCEDURE — 36415 COLL VENOUS BLD VENIPUNCTURE: CPT

## 2019-01-09 PROCEDURE — 84443 ASSAY THYROID STIM HORMONE: CPT

## 2019-01-09 PROCEDURE — 80061 LIPID PANEL: CPT

## 2019-01-09 PROCEDURE — 82306 VITAMIN D 25 HYDROXY: CPT

## 2019-01-09 PROCEDURE — 82570 ASSAY OF URINE CREATININE: CPT

## 2019-01-09 PROCEDURE — 82043 UR ALBUMIN QUANTITATIVE: CPT

## 2019-01-10 ENCOUNTER — TELEPHONE (OUTPATIENT)
Dept: MEDICAL GROUP | Facility: MEDICAL CENTER | Age: 54
End: 2019-01-10

## 2019-01-10 DIAGNOSIS — E03.8 OTHER SPECIFIED HYPOTHYROIDISM: ICD-10-CM

## 2019-01-10 RX ORDER — LEVOTHYROXINE SODIUM 0.12 MG/1
125 TABLET ORAL
Qty: 15 TAB | Refills: 0 | OUTPATIENT
Start: 2019-01-10

## 2019-01-10 NOTE — TELEPHONE ENCOUNTER
----- Message from JEFRY Thornton sent at 1/9/2019  8:28 PM PST -----  Please have pt set appointment to review and discuss treatment for labs.

## 2019-01-10 NOTE — TELEPHONE ENCOUNTER
Phone Number Called: [phone] (home)       Message: Patient notified of results.       Left Message for patient to call back: N\A

## 2019-01-30 ENCOUNTER — OFFICE VISIT (OUTPATIENT)
Dept: MEDICAL GROUP | Facility: MEDICAL CENTER | Age: 54
End: 2019-01-30
Payer: COMMERCIAL

## 2019-01-30 VITALS
HEIGHT: 63 IN | SYSTOLIC BLOOD PRESSURE: 120 MMHG | DIASTOLIC BLOOD PRESSURE: 80 MMHG | WEIGHT: 238 LBS | TEMPERATURE: 97.4 F | HEART RATE: 73 BPM | OXYGEN SATURATION: 98 % | BODY MASS INDEX: 42.17 KG/M2

## 2019-01-30 DIAGNOSIS — M25.561 ACUTE PAIN OF RIGHT KNEE: ICD-10-CM

## 2019-01-30 DIAGNOSIS — E55.9 VITAMIN D DEFICIENCY: ICD-10-CM

## 2019-01-30 DIAGNOSIS — E78.5 HYPERLIPIDEMIA LDL GOAL <130: ICD-10-CM

## 2019-01-30 DIAGNOSIS — M25.512 ACUTE PAIN OF LEFT SHOULDER: ICD-10-CM

## 2019-01-30 DIAGNOSIS — R79.89 ELEVATED LFTS: ICD-10-CM

## 2019-01-30 DIAGNOSIS — E03.8 OTHER SPECIFIED HYPOTHYROIDISM: ICD-10-CM

## 2019-01-30 DIAGNOSIS — I10 HYPERTENSION, ESSENTIAL: ICD-10-CM

## 2019-01-30 DIAGNOSIS — R94.6 ABNORMAL THYROID FUNCTION TEST: ICD-10-CM

## 2019-01-30 PROCEDURE — 99214 OFFICE O/P EST MOD 30 MIN: CPT | Performed by: NURSE PRACTITIONER

## 2019-01-30 RX ORDER — ERGOCALCIFEROL 1.25 MG/1
50000 CAPSULE ORAL
Qty: 12 CAP | Refills: 0 | Status: SHIPPED | OUTPATIENT
Start: 2019-01-30 | End: 2019-07-02

## 2019-01-30 RX ORDER — LEVOTHYROXINE SODIUM 0.12 MG/1
125 TABLET ORAL
Qty: 102 TAB | Refills: 0 | Status: SHIPPED | OUTPATIENT
Start: 2019-01-30 | End: 2019-02-05 | Stop reason: SDUPTHER

## 2019-01-30 RX ORDER — TRIAMTERENE AND HYDROCHLOROTHIAZIDE 37.5; 25 MG/1; MG/1
1 CAPSULE ORAL EVERY MORNING
Qty: 90 CAP | Refills: 3 | Status: SHIPPED | OUTPATIENT
Start: 2019-01-30 | End: 2019-07-02 | Stop reason: SDUPTHER

## 2019-01-30 RX ORDER — MELOXICAM 7.5 MG/1
7.5 TABLET ORAL DAILY
Qty: 14 TAB | Refills: 0 | Status: SHIPPED | OUTPATIENT
Start: 2019-01-30 | End: 2019-07-02

## 2019-01-30 ASSESSMENT — PATIENT HEALTH QUESTIONNAIRE - PHQ9: CLINICAL INTERPRETATION OF PHQ2 SCORE: 0

## 2019-01-30 NOTE — PROGRESS NOTES
Subjective:     Toyin Gallagher is a 53 y.o. female who presents with hypothyroidism.    HPI:   Seen in f/u for hypothyroidism.  She is doing well.   She had a good holiday.   She had to stop exercising recently d/t left shoulder and rt knee pain.  They are still painful.  No hx of injury.  Occurred with doing exercises.  Pain is in upper arm near AC  Joint.    Pain in rt knee is medial.  No hx of injury.   She has a hx of blood txfusion as a teenager.  She has donated blood multiple times for years.  No issues of hepatitis ever mentioned.  Ct abd in 2/17 showed normal liver.   Needs refills on meds.    Reviewed lab with pt.  Her GFR, alb/cr ratio, T4 is wnl  LP shows trg are up from 241 to 303.  Didn't follow healthy diet during holidays.  Also now not able to exercise d/t pain.  Not on meds.  HDL is ok.  LDL is up from 100 to 120.  Goal is <130.    CMP is wnl except elevated SGOT AND SGPT.  This is new.  Not on statin.    TSH is elevated at 6.3.  Was normal last year.  Taking meds approp.    Vitamin d is very low at 10.  Has done ergocalciferol in past.  Not on otc supplement.          Patient Active Problem List    Diagnosis Date Noted   • Pseudoangiomatous stromal hyperplasia of breast 03/28/2018   • Obesity (BMI 30-39.9) 12/14/2017   • Chronic cholecystitis 01/13/2017   • Iron deficiency anemia 11/30/2016   • Thrombocythemia (HCC) 11/30/2016   • Obesity (BMI 35.0-39.9 without comorbidity) 11/21/2016   • Hyperlipidemia LDL goal <130    • Vitamin D deficiency    • Hypothyroidism    • Hypertension        Current medicines (including changes today)  Current Outpatient Prescriptions   Medication Sig Dispense Refill   • vitamin D, Ergocalciferol, (DRISDOL) 08681 units Cap capsule Take 1 Cap by mouth every 7 days. 12 Cap 0   • triamterene/hctz (MAXZIDE-25/DYAZIDE) 37.5-25 MG Cap Take 1 Cap by mouth every morning. 90 Cap 3   • levothyroxine (SYNTHROID) 125 MCG Tab Take 1 Tab by mouth every morning before  "breakfast. TAKE ONE TABLET BY MOUTH EVERY MORNING ON EMPTY STOMACH 102 Tab 0   • meloxicam (MOBIC) 7.5 MG Tab Take 1 Tab by mouth every day. 14 Tab 0     No current facility-administered medications for this visit.        No Known Allergies    ROS  Constitutional: Negative. Negative for fever, chills, weight loss, malaise/fatigue and diaphoresis.   HENT: Negative. Negative for hearing loss, ear pain, nosebleeds, congestion, sore throat, neck pain, tinnitus and ear discharge.   Respiratory: Negative. Negative for cough, hemoptysis, sputum production, shortness of breath, wheezing and stridor.   Cardiovascular: Negative. Negative for chest pain, palpitations, orthopnea, claudication, leg swelling and PND.   Gastrointestinal: Denies nausea, vomiting, diarrhea, constipation, heartburn, melena or hematochezia.  Genitourinary: Denies dysuria, hematuria, urinary incontinence, frequency or urgency.        Objective:     Blood pressure 120/80, pulse 73, temperature 36.3 °C (97.4 °F), temperature source Temporal, height 1.6 m (5' 3\"), weight 108 kg (238 lb), SpO2 98 %, unknown if currently breastfeeding. Body mass index is 42.16 kg/m².    Physical Exam:  Vitals reviewed.  Constitutional: Oriented to person, place, and time. appears well-developed and well-nourished. No distress.   Cardiovascular: Normal rate, regular rhythm, normal heart sounds and intact distal pulses. Exam reveals no gallop and no friction rub. No murmur heard. No carotid bruits.   Pulmonary/Chest: Effort normal and breath sounds normal. No stridor. No respiratory distress. no wheezes or rales. exhibits no tenderness.   Musculoskeletal: painful arc left shoulder with range of motion. exhibits no edema. chasidy pedal pulses 2+.  Neg drawer,shannon and lachman test rt knee.   Lymphadenopathy: No cervical or supraclavicular adenopathy.   Neurological: Alert and oriented to person, place, and time. exhibits normal muscle tone.  Skin: Skin is warm and dry. No " diaphoresis.   Psychiatric: Normal mood and affect. Behavior is normal.      Assessment and Plan:     The following treatment plan was discussed:    1. Other specified hypothyroidism  levothyroxine (SYNTHROID) 125 MCG Tab    TSH    FREE THYROXINE    refill meds.  inc synthroid to 1 tab 5x/wk and 1.5 tab 2x/wk.  recheck lab 2 months.  f/u for review   2. Hyperlipidemia LDL goal <130  Lipid Profile    not as good as last time.  improve diet and exercise.  recheck LP in 2 months.  f/u for review.   3. Elevated LFTs  HEPATIC FUNCTION PANEL    new finding.  had blood txfusion long ago but donates blood w/o problems.  dc tyl.  no etoh intake.  recheck 2 months.  f/u for review   4. Vitamin D deficiency  vitamin D, Ergocalciferol, (DRISDOL) 96922 units Cap capsule    VITAMIN D,25 HYDROXY    ergocalciferol x 12 weeks then d3 2000 units dialy   5. Abnormal thyroid function test  TSH    FREE THYROXINE    TSH elevated with T4 wnl.  inc synthroid to 1.5 tabs 2x/wk and 1 tab 5x/wk   6. Acute pain of left shoulder  meloxicam (MOBIC) 7.5 MG Tab    DX-SHOULDER 2+ LEFT    DX-KNEE COMPLETE 4+ RIGHT    xray shoulder.  use mobic x 14 days.  if not improved consider PT vs MRI on both knee and shoulder   7. Acute pain of right knee  meloxicam (MOBIC) 7.5 MG Tab    DX-SHOULDER 2+ LEFT    DX-KNEE COMPLETE 4+ RIGHT   8. Hypertension, essential  triamterene/hctz (MAXZIDE-25/DYAZIDE) 37.5-25 MG Cap         Followup: Return in about 2 months (around 3/30/2019).

## 2019-02-05 DIAGNOSIS — E03.8 OTHER SPECIFIED HYPOTHYROIDISM: ICD-10-CM

## 2019-02-05 RX ORDER — LEVOTHYROXINE SODIUM 0.12 MG/1
TABLET ORAL
Qty: 102 TAB | Refills: 1 | Status: SHIPPED | OUTPATIENT
Start: 2019-02-05 | End: 2019-07-02 | Stop reason: SDUPTHER

## 2019-03-06 ENCOUNTER — TELEPHONE (OUTPATIENT)
Dept: MEDICAL GROUP | Facility: MEDICAL CENTER | Age: 54
End: 2019-03-06

## 2019-03-06 DIAGNOSIS — N63.0 BREAST NODULE: ICD-10-CM

## 2019-03-06 NOTE — TELEPHONE ENCOUNTER
Please let pt know that its time to recheck the left breast nodule.  i have ordered the us and dx mammo

## 2019-03-06 NOTE — LETTER
March 7, 2019        Toyin Gallagher  [address]  [place] [address]        Dear Toyin:    Lisette Neville's office has tried contacting you. At your earliest convenience please contact our office at [phone].      If you have any questions or concerns, please don't hesitate to call.        Sincerely,        Lisette Neville, DANIELLE.    Electronically Signed

## 2019-03-07 ENCOUNTER — TELEPHONE (OUTPATIENT)
Dept: MEDICAL GROUP | Facility: MEDICAL CENTER | Age: 54
End: 2019-03-07

## 2019-03-07 ENCOUNTER — HOSPITAL ENCOUNTER (OUTPATIENT)
Dept: RADIOLOGY | Facility: MEDICAL CENTER | Age: 54
End: 2019-03-07
Attending: NURSE PRACTITIONER
Payer: COMMERCIAL

## 2019-03-07 DIAGNOSIS — M25.561 ACUTE PAIN OF RIGHT KNEE: ICD-10-CM

## 2019-03-07 DIAGNOSIS — M25.512 ACUTE PAIN OF LEFT SHOULDER: ICD-10-CM

## 2019-03-07 PROCEDURE — 73030 X-RAY EXAM OF SHOULDER: CPT | Mod: LT

## 2019-03-07 PROCEDURE — 73562 X-RAY EXAM OF KNEE 3: CPT | Mod: RT

## 2019-03-07 NOTE — TELEPHONE ENCOUNTER
Please let pt know that the left shoulder xray shows mild OA and some tendonitis.    Her rt knee xray shows mod OA and changes from old injury.    We can try PT initially for the shoulder and knee.  If not improved will consider MRI and ortho referral.    Is she ok with me placing the PT referral?

## 2019-04-03 ENCOUNTER — PHYSICAL THERAPY (OUTPATIENT)
Dept: PHYSICAL THERAPY | Facility: MEDICAL CENTER | Age: 54
End: 2019-04-03
Attending: NURSE PRACTITIONER
Payer: COMMERCIAL

## 2019-04-03 DIAGNOSIS — M25.561 ACUTE PAIN OF RIGHT KNEE: ICD-10-CM

## 2019-04-03 DIAGNOSIS — M25.512 ACUTE PAIN OF LEFT SHOULDER: ICD-10-CM

## 2019-04-03 PROCEDURE — 97110 THERAPEUTIC EXERCISES: CPT

## 2019-04-03 PROCEDURE — 97162 PT EVAL MOD COMPLEX 30 MIN: CPT

## 2019-04-03 ASSESSMENT — ENCOUNTER SYMPTOMS
QUALITY: DISCOMFORT
PAIN TIMING: WHEN ACTIVE
PAIN SCALE: 1
QUALITY: SHARP
QUALITY: ACHING
QUALITY: DULL ACHE
PAIN TIMING: INTERMITTENT
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
## 2019-04-10 ENCOUNTER — PHYSICAL THERAPY (OUTPATIENT)
Dept: PHYSICAL THERAPY | Facility: MEDICAL CENTER | Age: 54
End: 2019-04-10
Attending: NURSE PRACTITIONER
Payer: COMMERCIAL

## 2019-04-10 DIAGNOSIS — M25.561 ACUTE PAIN OF RIGHT KNEE: ICD-10-CM

## 2019-04-10 DIAGNOSIS — M25.512 ACUTE PAIN OF LEFT SHOULDER: ICD-10-CM

## 2019-04-10 PROCEDURE — 97110 THERAPEUTIC EXERCISES: CPT

## 2019-04-10 NOTE — OP THERAPY DAILY TREATMENT
Outpatient Physical Therapy  DAILY TREATMENT     [facility] Outpatient Physical Therapy  [address]  [place] [address]  Phone:  [phone]  Fax:  [phone]    Date: 04/10/2019    Patient: Toyin Gallagher  YOB: 1965  MRN: [id number]     Time Calculation  Start time: 0945  Stop time: 1025 Time Calculation (min): 40 minutes     Chief Complaint: Knee Problem; Knee Injury; Shoulder Injury; and Shoulder Problem    Visit #: 2    SUBJECTIVE:  Overall exercises make me slightly sore, knee a little sore, not painful.    OBJECTIVE:  Current objective measures: R hi p abd sidelying 4/5  R Clam 4-/5  Where L 4+ to 5/5 for clam and hip abd sidelying           Therapeutic Exercises (CPT 10398):     1. Nu step, 3 min, S8 A11 R3    2. Row, 10, L2    3. Shoulder ext, 10, L2    4. Sh ER, 10 , L2    5. Towel stretch for shoulder IR, 30 sec    6. QL door stretch, 30 sec    7. Pec stretch, 30 sec    8. Sidelying hip abd, 10, L1    9. Sidelying clam, 10, L1    10. Sidestep, 6 ft L and R, L1    11. Monster walk, fwd and retro, side to side, x2, 10ft, L1    12. Hip flexion stretch, 90 sec    Therapeutic Treatments and Modalities:     1. Hot or Cold Pack Therapy (CPT 88371), ice pack to shoulder x 10 min, NC    Time-based treatments/modalities:  Therapeutic exercise minutes (CPT 14330): 30 minutes       Pain rating before treatment: 0  Pain rating after treatment: 0    ASSESSMENT:   Response to treatment: some noted weakness right hip muscles, plan to progress strength to give knee more stability and better control.    PLAN/RECOMMENDATIONS:   Plan for treatment: therapy treatment to continue next visit.  Planned interventions for next visit: continue with current treatment.

## 2019-04-17 ENCOUNTER — PHYSICAL THERAPY (OUTPATIENT)
Dept: PHYSICAL THERAPY | Facility: MEDICAL CENTER | Age: 54
End: 2019-04-17
Attending: NURSE PRACTITIONER
Payer: COMMERCIAL

## 2019-04-17 DIAGNOSIS — M25.561 ACUTE PAIN OF RIGHT KNEE: ICD-10-CM

## 2019-04-17 DIAGNOSIS — M25.512 ACUTE PAIN OF LEFT SHOULDER: ICD-10-CM

## 2019-04-17 PROCEDURE — 97110 THERAPEUTIC EXERCISES: CPT

## 2019-04-17 NOTE — OP THERAPY DAILY TREATMENT
Outpatient Physical Therapy  DAILY TREATMENT     [facility] Outpatient Physical Therapy  [address]  [place] [address]  Phone:  [phone]  Fax:  [phone]    Date: 04/17/2019    Patient: Toyin Gallagher  YOB: 1965  MRN: [id number]     Time Calculation  Start time: 0945  Stop time: 1024 Time Calculation (min): 39 minutes     Chief Complaint: Knee Problem and Shoulder Problem    Visit #: 3    SUBJECTIVE:  No pain today, minimal stiffness after sitting too long or standing too long.  Did irritate my shoulder with lowering a heavy box off a high shelf.  Did not do as many exercises as I had hoped, but not due to pain or discomfort with the exercises.    OBJECTIVE:  Current objective measures: better control with hip abductor muscles today with exercise          Therapeutic Exercises (CPT 70147):     1. Nu step, 3 min, S8 A11 R3    2. TG Leg press L8, 10/3    3. Calf stretches on wedge, 10 reps, then 30 sec hold    4. Side step ups on 4 inch step, 5 each    5. Eccentric lowering off wedge, 5 each    6. Stool scooting, 10 ft x 2    7. Opp pull with opp LE step with L1 resist, 10 each, L1    8. Opp push with opp LE step with L1 resist, 10 each, L1    9. Sidelying clam, 5/2 each, L1    10. Sidestep, 6 ft L and R, L1    11. Monster walk, fwd and retro, side to side, x2, 10ft, L1    12. Sidelying hip abd , 5/2 each, L1    Therapeutic Treatments and Modalities:     1. Hot or Cold Pack Therapy (CPT 92742), ice pack to shoulder and knee x 10 min, NC    Time-based treatments/modalities:  Therapeutic exercise minutes (CPT 91160): 29 minutes       Pain rating before treatment: 0, minimal stiffness  Pain rating after treatment: 0    ASSESSMENT:   Response to treatment: progressing with exercises, will return to shoulder work next visit.    PLAN/RECOMMENDATIONS:   Plan for treatment: therapy treatment to continue next visit.  Planned interventions for next visit: continue  with current treatment.

## 2019-04-24 ENCOUNTER — PHYSICAL THERAPY (OUTPATIENT)
Dept: PHYSICAL THERAPY | Facility: MEDICAL CENTER | Age: 54
End: 2019-04-24
Attending: NURSE PRACTITIONER
Payer: COMMERCIAL

## 2019-04-24 DIAGNOSIS — M25.561 ACUTE PAIN OF RIGHT KNEE: ICD-10-CM

## 2019-04-24 DIAGNOSIS — M25.512 ACUTE PAIN OF LEFT SHOULDER: ICD-10-CM

## 2019-04-24 PROCEDURE — 97014 ELECTRIC STIMULATION THERAPY: CPT

## 2019-04-24 PROCEDURE — 97110 THERAPEUTIC EXERCISES: CPT

## 2019-04-24 NOTE — OP THERAPY DAILY TREATMENT
Outpatient Physical Therapy  DAILY TREATMENT     [facility] Outpatient Physical Therapy  [address]  [place] [address]  Phone:  [phone]  Fax:  [phone]    Date: 04/24/2019    Patient: Toyin Gallagher  YOB: 1965  MRN: [id number]     Time Calculation  Start time: 1000  Stop time: 1045 Time Calculation (min): 45 minutes     Chief Complaint: Back Problem; Difficulty Walking; Shoulder Problem; and Knee Problem    Visit #: 4    SUBJECTIVE:  No pain today, minimal stiffness after sitting too long or standing too long. Her shoulder is actually feeling better/pretty good today.     OBJECTIVE:  Current objective measures: better control with hip abductor muscles today with exercise          Therapeutic Exercises (CPT 81072):     1. Nu step, 3 min, S8 A11 R3    2. Supine , Trunk Rotation, bridge and hs curl    3. Standing LE ext on pulley , 10#, 15 reos each side    4. Tandem balance work on foam roller    5. Tandem on flat ground with head turn, eyes closed and small weight pass.     Therapeutic Treatments and Modalities:     1. E Stim Unattended (CPT 15514), Ice and IFC to R knee x 15 min    Time-based treatments/modalities:  Therapeutic exercise minutes (CPT 05508): 30 minutes       Pain rating before treatment: 0, minimal stiffness  Pain rating after treatment: 0    ASSESSMENT:   Response to treatment: Continue progressing exercises as able.   PLAN/RECOMMENDATIONS:   Plan for treatment: therapy treatment to continue next visit.  Planned interventions for next visit: continue with current treatment.

## 2019-04-25 DIAGNOSIS — E03.8 OTHER SPECIFIED HYPOTHYROIDISM: ICD-10-CM

## 2019-04-26 RX ORDER — LEVOTHYROXINE SODIUM 0.12 MG/1
TABLET ORAL
Qty: 30 TAB | Refills: 0 | Status: SHIPPED | OUTPATIENT
Start: 2019-04-26 | End: 2019-07-02

## 2019-04-30 ENCOUNTER — PHYSICAL THERAPY (OUTPATIENT)
Dept: PHYSICAL THERAPY | Facility: MEDICAL CENTER | Age: 54
End: 2019-04-30
Attending: NURSE PRACTITIONER
Payer: COMMERCIAL

## 2019-04-30 DIAGNOSIS — M25.561 ACUTE PAIN OF RIGHT KNEE: ICD-10-CM

## 2019-04-30 DIAGNOSIS — M25.512 ACUTE PAIN OF LEFT SHOULDER: ICD-10-CM

## 2019-04-30 PROCEDURE — 97140 MANUAL THERAPY 1/> REGIONS: CPT

## 2019-04-30 PROCEDURE — 97110 THERAPEUTIC EXERCISES: CPT

## 2019-04-30 NOTE — OP THERAPY DAILY TREATMENT
Outpatient Physical Therapy  DAILY TREATMENT     [facility] Outpatient Physical Therapy  [address]  [place] [address]  Phone:  [phone]  Fax:  [phone]    Date: 04/30/2019    Patient: Toyin Gallagher  YOB: 1965  MRN: [id number]     Time Calculation  Start time: 1016  Stop time: 1045 Time Calculation (min): 29 minutes     Chief Complaint: Knee Problem; Knee Injury; Shoulder Problem; and Shoulder Injury    Visit #: 5    SUBJECTIVE:  Able to garden yesterday for an hour without knee pain and did not tweak or interfere with work.    OBJECTIVE:  Current objective measures: shoulder flex 180  abd 170 with pinch  IR to bra strap with pain  90 degrees abd with ER at 90 with pinch          Therapeutic Exercises (CPT 42567):     1. Nu step, 3 min, S8 A11 R3    2. Sitting with support for shoulder- ER at 30 degrees, 10/2, L1    3. Sitting with support for shoulder- IR at 30 degrees, 10/2, L1    4. Foam roll stretcing ex    Therapeutic Treatments and Modalities:     1. Manual Therapy (CPT 28892), GH jt mobs AP, AC mobs, scap release, eccentric and concentric scap work    Time-based treatments/modalities:  Manual therapy minutes (CPT 68943): 14 minutes  Therapeutic exercise minutes (CPT 25752): 15 minutes       Pain rating before treatment: 0 knee, 1-2 for shoulder  Pain rating after treatment: 0    ASSESSMENT:   Response to treatment: able to get in shoulder abd to 90 with ER to 90 without pinch after MT and ther ex    PLAN/RECOMMENDATIONS:   Plan for treatment: therapy treatment to continue next visit.  Planned interventions for next visit: continue with current treatment.

## 2019-05-03 ENCOUNTER — PHYSICAL THERAPY (OUTPATIENT)
Dept: PHYSICAL THERAPY | Facility: MEDICAL CENTER | Age: 54
End: 2019-05-03
Attending: NURSE PRACTITIONER
Payer: COMMERCIAL

## 2019-05-03 DIAGNOSIS — M25.512 ACUTE PAIN OF LEFT SHOULDER: ICD-10-CM

## 2019-05-03 DIAGNOSIS — M25.561 ACUTE PAIN OF RIGHT KNEE: ICD-10-CM

## 2019-05-03 PROCEDURE — 97110 THERAPEUTIC EXERCISES: CPT

## 2019-05-03 PROCEDURE — 97140 MANUAL THERAPY 1/> REGIONS: CPT

## 2019-05-03 NOTE — OP THERAPY DAILY TREATMENT
Outpatient Physical Therapy  DAILY TREATMENT     [facility] Outpatient Physical Therapy  [address]  [place] [address]  Phone:  [phone]  Fax:  [phone]      Date: 05/03/2019    Patient: Toyin Gallagher  YOB: 1965  MRN: [id number]     Time Calculation  Start time: 1000  Stop time: 1030 Time Calculation (min): 30 minutes     Chief Complaint: Back Problem; Arm Problem; and Shoulder Problem    Visit #: 6    SUBJECTIVE:  Doing really well. Wants to know if she can start kickboxing again. Suggested that she try and be aware of tempo, bracing and limiting some awkward or unsure motion.     OBJECTIVE:  Current objective measures:           Therapeutic Exercises (CPT 90033):     1. Nu Step, Level 5 x 5 mins    2. Push pull with pulley and bands, 15# pull, green band push, 15 reps each side    3. Single arm row, 30#, 10 reps focus on nto losing rotation and geting pulled around    Therapeutic Treatments and Modalities:     1. Manual Therapy (CPT 77915), L shoulder, IASTM to upper trap and psot cuff, Pin and stretch with head lean, chin tuck and L UE hor AD    Time-based treatments/modalities:  Manual therapy minutes (CPT 31857): 15 minutes  Therapeutic exercise minutes (CPT 47103): 15 minutes     ASSESSMENT:   Response to treatment: She is doing well and feels like she is in a good place. Will have a few more follow ups as she tries to get back into classes.     PLAN/RECOMMENDATIONS:   Plan for treatment: therapy treatment to continue next visit.  Planned interventions for next visit: continue with current treatment.

## 2019-05-06 ENCOUNTER — PHYSICAL THERAPY (OUTPATIENT)
Dept: PHYSICAL THERAPY | Facility: MEDICAL CENTER | Age: 54
End: 2019-05-06
Attending: NURSE PRACTITIONER
Payer: COMMERCIAL

## 2019-05-06 DIAGNOSIS — M25.561 ACUTE PAIN OF RIGHT KNEE: ICD-10-CM

## 2019-05-06 DIAGNOSIS — M25.512 ACUTE PAIN OF LEFT SHOULDER: ICD-10-CM

## 2019-05-06 PROCEDURE — 97110 THERAPEUTIC EXERCISES: CPT

## 2019-05-06 PROCEDURE — 97014 ELECTRIC STIMULATION THERAPY: CPT

## 2019-05-06 PROCEDURE — 97140 MANUAL THERAPY 1/> REGIONS: CPT

## 2019-05-06 NOTE — OP THERAPY DAILY TREATMENT
Outpatient Physical Therapy  DAILY TREATMENT     [facility] Outpatient Physical Therapy  [address]  [place] [address]  Phone:  [phone]  Fax:  [phone]      Date: 05/06/2019    Patient: Toyin Gallagher  YOB: 1965  MRN: [id number]     Time Calculation  Start time: 0900  Stop time: 0945 Time Calculation (min): 45 minutes     Chief Complaint: Back Problem; Knee Injury; and Shoulder Problem    Visit #: 7    SUBJECTIVE:  Doing really well. Did some planting this weekend. Doing ok. Will try and go to Kickboxing sometime this week.     OBJECTIVE:  Current objective measures:           Therapeutic Exercises (CPT 73267):     1. Nu Step, Level 5 x 5 mins    2. TRX rows, Double arm x 10 , progressivly increasing challenge    3. TRX row, Single arm x 7-8    4. Counter/Table Push up, with mod lunge stance for assistance. , x 10-12 reps     Therapeutic Treatments and Modalities:     1. Manual Therapy (CPT 56148), L shoulder, IASTM to upper trap and psot cuff, Pin and stretch with head lean, chin tuck and L UE hor AD    2. E Stim Unattended (CPT 82779), L shoulder, IFC and heat to L shoulder    Time-based treatments/modalities:  Manual therapy minutes (CPT 93481): 15 minutes  Therapeutic exercise minutes (CPT 20135): 15 minutes     ASSESSMENT:   Response to treatment: She is doing well and feels like she is in a good place. Will have a few more follow ups as she tries to get back into classes.     PLAN/RECOMMENDATIONS:   Plan for treatment: therapy treatment to continue next visit.  Planned interventions for next visit: continue with current treatment.

## 2019-05-14 ENCOUNTER — TELEPHONE (OUTPATIENT)
Dept: HEMATOLOGY ONCOLOGY | Facility: MEDICAL CENTER | Age: 54
End: 2019-05-14

## 2019-05-14 NOTE — TELEPHONE ENCOUNTER
Called the patient in follow up to letter sent dated 3/25/19 informing patient that Dr. Gonzalez is leaving [facility].  No answer, left voicemail for patient requesting a return call.

## 2019-05-17 ENCOUNTER — PHYSICAL THERAPY (OUTPATIENT)
Dept: PHYSICAL THERAPY | Facility: MEDICAL CENTER | Age: 54
End: 2019-05-17
Attending: NURSE PRACTITIONER
Payer: COMMERCIAL

## 2019-05-17 DIAGNOSIS — M25.512 ACUTE PAIN OF LEFT SHOULDER: ICD-10-CM

## 2019-05-17 DIAGNOSIS — M25.561 ACUTE PAIN OF RIGHT KNEE: ICD-10-CM

## 2019-05-17 PROCEDURE — 97110 THERAPEUTIC EXERCISES: CPT

## 2019-05-17 NOTE — OP THERAPY DAILY TREATMENT
Outpatient Physical Therapy  DAILY TREATMENT     [facility] Outpatient Physical Therapy  [address]  [place] [address]  Phone:  [phone]  Fax:  [phone]      Date: 05/17/2019    Patient: Toyin Gallagher  YOB: 1965  MRN: [id number]     Time Calculation  Start time: 0800  Stop time: 0830 Time Calculation (min): 30 minutes     Chief Complaint: Difficulty Walking; Knee Problem; and Shoulder Problem    Visit #: 8    SUBJECTIVE:  Doing really well. She is making modifications to kickboxing. Gonna be traveling some this  Summer.     OBJECTIVE:  Current objective measures:           Therapeutic Exercises (CPT 35110):     1. Nu Step, Level 5 x 5 mins    2. LE single leg pulley hip ext    3. Slider to wlak    4. Mnster walk and banded hip flexor      Therapeutic Exercise Summary: Access Code: [email or web address]   URL: [email or web address]   Date: 05/17/2019   Prepared by: Kamari Crow     Exercises  · Side Stepping with Resistance at Feet - 15-7 reps - 3 sets - 1x daily - 1x weekly  · Supine Hip Flexion with Resistance Loop - 5 reps - 3 sets - 1x daily - 1x weekly      Therapeutic Treatments and Modalities:     1. Manual Therapy (CPT 15589), L shoulder, IASTM to upper trap and psot cuff, Pin and stretch with head lean, chin tuck and L UE hor AD    2. E Stim Unattended (CPT 77330), L shoulder, IFC and heat to L shoulder    Time-based treatments/modalities:  Therapeutic exercise minutes (CPT 43628): 30 minutes     ASSESSMENT:   Response to treatment: She is doing well and feels like she is in a good place. Will have a few more follow ups as she tries to get back into classes.     PLAN/RECOMMENDATIONS:   Plan for treatment: therapy treatment to continue next visit.  Planned interventions for next visit: continue with current treatment.

## 2019-05-22 ENCOUNTER — TELEPHONE (OUTPATIENT)
Dept: MEDICAL GROUP | Facility: MEDICAL CENTER | Age: 54
End: 2019-05-22

## 2019-05-22 NOTE — LETTER
May 29, 2019        Toyin Gallagher  [address]  [place] [address]        Dear Toyin:    Lisette Neville's office has tried contacting you. At your earliest convenience please contact our office at [phone].    If you have any questions or concerns, please don't hesitate to call.        Sincerely,        Lisette Neville, DANIELLE.    Electronically Signed

## 2019-05-22 NOTE — TELEPHONE ENCOUNTER
Please check with pt to see if she is going to do the recommended mammo and rt breast us that was ordered in march.

## 2019-05-24 ENCOUNTER — APPOINTMENT (OUTPATIENT)
Dept: PHYSICAL THERAPY | Facility: MEDICAL CENTER | Age: 54
End: 2019-05-24
Attending: NURSE PRACTITIONER
Payer: COMMERCIAL

## 2019-05-28 ENCOUNTER — PHYSICAL THERAPY (OUTPATIENT)
Dept: PHYSICAL THERAPY | Facility: MEDICAL CENTER | Age: 54
End: 2019-05-28
Attending: NURSE PRACTITIONER
Payer: COMMERCIAL

## 2019-05-28 DIAGNOSIS — M25.561 ACUTE PAIN OF RIGHT KNEE: ICD-10-CM

## 2019-05-28 DIAGNOSIS — M25.512 ACUTE PAIN OF LEFT SHOULDER: ICD-10-CM

## 2019-05-28 PROCEDURE — 97110 THERAPEUTIC EXERCISES: CPT
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
## 2019-05-28 NOTE — OP THERAPY DAILY TREATMENT
"      Outpatient Physical Therapy  DAILY TREATMENT     [facility] Outpatient Physical Therapy  [address]  [place] [address]  Phone:  [phone]  Fax:  [phone]      Date: 05/28/2019    Patient: Toyin Gallagher  YOB: 1965  MRN: [id number]     Time Calculation             Chief Complaint: Difficulty Walking; Knee Problem; and Shoulder Problem    Visit #: 9    SUBJECTIVE:  Doing really well. She is making modifications to kickboxing. Gonna be traveling some this  Summer.     OBJECTIVE:  Current objective measures:   Neck Disability Total: 1  WOMAC Grand Total: 4.17 Short Term Goals:   1. Establish HEP   2. Decrease L shoulder pain symptoms by 25% via subjective report/Objective pain scale  3. Decrease R knee pain by 25% via subjective report/objective pain scale  4. Increase Shoulder IR/Behind the back from L5/SI to TL junction  5. Establish quick dash or NDI  6. Able to participate exercise or walking 2 x week without increase in symptoms   7. Patient to show improved standing 10+ mins without increase in symptoms Goal met  8. Patient to show ability to walk 20+ min without increase in symptoms GOAL MET   9. Patient to show eccentric control to sit to standard chair without hands   10. Patient to show eccentric control down standard 4-6\" step   11. Establish WOMAC score  Short term goal time span:  4-6 weeks      Long Term Goals:    1. Ind in HEP  2. Decrease pain symptoms by 50% via subjective report/Objective pain scale  3. Increase Shoulder IR/Behind the back within 4\" of non affected   4. Able to lift 10# overhead without increase in symptoms  5. Decrease NDI score by 5 points   6. Able to participate in exercise walking 3+ week without increase in symptoms   7. Mod I in squat and DL patterns with good hip hinge   8. Reduction of WOMAC score by 5 points      Therapeutic Exercises (CPT 35010):     1. Upright, Level1 x 5    2. Stair case , Step through on " "steps    3. Eccentric chair , good     4. Box step , 16\" L, 12\", 14\" R       Therapeutic Exercise Summary: Access Code: [email or web address]   URL: [email or web address]   Date: 05/17/2019   Prepared by: Kamari Crow     Exercises  · Side Stepping with Resistance at Feet - 15-7 reps - 3 sets - 1x daily - 1x weekly  · Supine Hip Flexion with Resistance Loop - 5 reps - 3 sets - 1x daily - 1x weekly        Time-based treatments/modalities:        ASSESSMENT:   Response to treatment: She is doing well and feels like she is in a good place. DC to follow   PLAN/RECOMMENDATIONS:   Plan for treatment: DC to Follow.   "

## 2019-05-30 ENCOUNTER — HOSPITAL ENCOUNTER (OUTPATIENT)
Dept: LAB | Facility: MEDICAL CENTER | Age: 54
End: 2019-05-30
Attending: NURSE PRACTITIONER
Payer: COMMERCIAL

## 2019-05-30 DIAGNOSIS — E55.9 VITAMIN D DEFICIENCY: ICD-10-CM

## 2019-05-30 DIAGNOSIS — E78.5 HYPERLIPIDEMIA LDL GOAL <130: ICD-10-CM

## 2019-05-30 DIAGNOSIS — R94.6 ABNORMAL THYROID FUNCTION TEST: ICD-10-CM

## 2019-05-30 DIAGNOSIS — R79.89 ELEVATED LFTS: ICD-10-CM

## 2019-05-30 DIAGNOSIS — E03.8 OTHER SPECIFIED HYPOTHYROIDISM: ICD-10-CM

## 2019-05-30 LAB
25(OH)D3 SERPL-MCNC: 22 NG/ML (ref 30–100)
ALBUMIN SERPL BCP-MCNC: 3.8 G/DL (ref 3.2–4.9)
ALP SERPL-CCNC: 52 U/L (ref 30–99)
ALT SERPL-CCNC: 12 U/L (ref 2–50)
AST SERPL-CCNC: 17 U/L (ref 12–45)
BILIRUB CONJ SERPL-MCNC: <0.1 MG/DL (ref 0.1–0.5)
BILIRUB INDIRECT SERPL-MCNC: NORMAL MG/DL (ref 0–1)
BILIRUB SERPL-MCNC: 0.3 MG/DL (ref 0.1–1.5)
CHOLEST SERPL-MCNC: 214 MG/DL (ref 100–199)
FASTING STATUS PATIENT QL REPORTED: NORMAL
HDLC SERPL-MCNC: 36 MG/DL
LDLC SERPL CALC-MCNC: 143 MG/DL
PROT SERPL-MCNC: 6.7 G/DL (ref 6–8.2)
T4 FREE SERPL-MCNC: 1.15 NG/DL (ref 0.53–1.43)
TRIGL SERPL-MCNC: 175 MG/DL (ref 0–149)
TSH SERPL DL<=0.005 MIU/L-ACNC: 0.65 UIU/ML (ref 0.38–5.33)

## 2019-05-30 PROCEDURE — 80061 LIPID PANEL: CPT

## 2019-05-30 PROCEDURE — 84439 ASSAY OF FREE THYROXINE: CPT

## 2019-05-30 PROCEDURE — 80076 HEPATIC FUNCTION PANEL: CPT

## 2019-05-30 PROCEDURE — 82306 VITAMIN D 25 HYDROXY: CPT

## 2019-05-30 PROCEDURE — 36415 COLL VENOUS BLD VENIPUNCTURE: CPT

## 2019-05-30 PROCEDURE — 84443 ASSAY THYROID STIM HORMONE: CPT

## 2019-06-03 ENCOUNTER — TELEPHONE (OUTPATIENT)
Dept: MEDICAL GROUP | Facility: MEDICAL CENTER | Age: 54
End: 2019-06-03

## 2019-06-03 NOTE — LETTER
Lisa 3, 2019        Toyin Gallagher  [address]  [place] [address]        Dear Toyin:    After careful review of your chart, we have noted you are due for a follow up appointment.  We request you call our office at [phone] at your earliest convenience and make an appointment.     We look forward to scheduling an appointment for you, so that we may provide you with the safest and most complete medical care.        If you have any questions or concerns, please don't hesitate to call.        Sincerely,        DANIELLE Thornton.    Electronically Signed

## 2019-06-03 NOTE — TELEPHONE ENCOUNTER
----- Message from JEFRY Thornton sent at 6/1/2019  3:20 PM PDT -----  Please have pt set appointment to review and discuss treatment for labs.

## 2019-07-02 ENCOUNTER — OFFICE VISIT (OUTPATIENT)
Dept: MEDICAL GROUP | Facility: MEDICAL CENTER | Age: 54
End: 2019-07-02
Payer: COMMERCIAL

## 2019-07-02 VITALS
OXYGEN SATURATION: 98 % | TEMPERATURE: 97.2 F | HEART RATE: 61 BPM | BODY MASS INDEX: 43.59 KG/M2 | HEIGHT: 63 IN | DIASTOLIC BLOOD PRESSURE: 72 MMHG | SYSTOLIC BLOOD PRESSURE: 114 MMHG | WEIGHT: 246 LBS

## 2019-07-02 DIAGNOSIS — E66.01 MORBID OBESITY WITH BMI OF 40.0-44.9, ADULT (HCC): ICD-10-CM

## 2019-07-02 DIAGNOSIS — I10 HYPERTENSION, ESSENTIAL: ICD-10-CM

## 2019-07-02 DIAGNOSIS — E55.9 VITAMIN D DEFICIENCY: ICD-10-CM

## 2019-07-02 DIAGNOSIS — E03.8 OTHER SPECIFIED HYPOTHYROIDISM: ICD-10-CM

## 2019-07-02 DIAGNOSIS — E78.5 HYPERLIPIDEMIA LDL GOAL <100: ICD-10-CM

## 2019-07-02 PROCEDURE — 99214 OFFICE O/P EST MOD 30 MIN: CPT | Performed by: NURSE PRACTITIONER

## 2019-07-02 RX ORDER — TRIAMTERENE AND HYDROCHLOROTHIAZIDE 37.5; 25 MG/1; MG/1
1 CAPSULE ORAL EVERY MORNING
Qty: 90 CAP | Refills: 3 | Status: SHIPPED | OUTPATIENT
Start: 2019-07-02 | End: 2020-05-20 | Stop reason: SDUPTHER

## 2019-07-02 RX ORDER — LEVOTHYROXINE SODIUM 0.12 MG/1
TABLET ORAL
Qty: 102 TAB | Refills: 3 | Status: SHIPPED | OUTPATIENT
Start: 2019-07-02 | End: 2020-05-20 | Stop reason: SDUPTHER

## 2019-07-02 NOTE — PROGRESS NOTES
Subjective:     Toyin Gallagher is a 53 y.o. female who presents with HTN.    HPI:   Seen in f/u for HTN.  She is feeling well.  She needs refill on meds.    Stable on bp med.  Bp well here and at home.  Taking meds approp.  Reviewed lab with pt.  Her HFP is now wnl.  Previous elevated LFT's is resolved.  Vitamin d is again low.  She took the ergocalciferol.  Not consistently taking otc supplement.  TSH, T4 is wnl.  Was previously abn but dose chg good.  LP shows dramatic improvement in trg.  Were 307 and now 175.  HDL still low at 36.  Not able to exercise much until  Now d/t shoulder and knee pain.  LDL is up from 120 to 143.  Goal is <100.  She is reluctant to taking statin.      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   • Obesity (BMI 30-39.9) 12/14/2017   • Chronic cholecystitis 01/13/2017   • Iron deficiency anemia 11/30/2016   • Thrombocythemia (HCC) 11/30/2016   • Obesity (BMI 35.0-39.9 without comorbidity) 11/21/2016   • Hyperlipidemia LDL goal <130    • Vitamin D deficiency    • Hypothyroidism    • Hypertension        Current medicines (including changes today)  Current Outpatient Prescriptions   Medication Sig Dispense Refill   • levothyroxine (SYNTHROID) 125 MCG Tab Take 1 pill a day for 5 days/week and 1.5 pills a day for 2 days/week. 102 Tab 3   • triamterene/hctz (MAXZIDE-25/DYAZIDE) 37.5-25 MG Cap Take 1 Cap by mouth every morning. 90 Cap 3     No current facility-administered medications for this visit.        No Known Allergies    ROS  Constitutional: Negative. Negative for fever, chills, weight loss, malaise/fatigue and diaphoresis.   HENT: Negative. Negative for hearing loss, ear pain, nosebleeds, congestion, sore throat, neck pain, tinnitus and ear discharge.   Respiratory: Negative. Negative for cough, hemoptysis, sputum production, shortness of  "breath, wheezing and stridor.   Cardiovascular: Negative. Negative for chest pain, palpitations, orthopnea, claudication, leg swelling and PND.   Gastrointestinal: Denies nausea, vomiting, diarrhea, constipation, heartburn, melena or hematochezia.  Genitourinary: Denies dysuria, hematuria, urinary incontinence, frequency or urgency.        Objective:     /72   Pulse 61   Temp 36.2 °C (97.2 °F) (Temporal)   Ht 1.6 m (5' 3\")   Wt 111.6 kg (246 lb)   SpO2 98%  Body mass index is 43.58 kg/m².    Physical Exam:  Vitals reviewed.  Constitutional: Oriented to person, place, and time. appears well-developed and well-nourished. No distress.   Cardiovascular: Normal rate, regular rhythm, normal heart sounds and intact distal pulses. Exam reveals no gallop and no friction rub. No murmur heard. No carotid bruits.   Pulmonary/Chest: Effort normal and breath sounds normal. No stridor. No respiratory distress. no wheezes or rales. exhibits no tenderness.   Musculoskeletal: Normal range of motion. exhibits no edema. chasidy pedal pulses 2+.  Lymphadenopathy: No cervical or supraclavicular adenopathy.   Neurological: Alert and oriented to person, place, and time. exhibits normal muscle tone.  Skin: Skin is warm and dry. No diaphoresis.   Psychiatric: Normal mood and affect. Behavior is normal.      Assessment and Plan:     The following treatment plan was discussed:    1. Other specified hypothyroidism  levothyroxine (SYNTHROID) 125 MCG Tab    refill meds.  stable on meds.  lab now wnl   2. Hyperlipidemia LDL goal <100  LipoFit by NMR    LP - PLA2    CT-HEART W/O CONT EVAL CALCIUM    LDL up and not at goal.  trg much better.  continue diet.  improve exercise.  plan recheck lipoprotein qt and LP(a) in 6 mo.  f/u for review   3. Hypertension, essential  triamterene/hctz (MAXZIDE-25/DYAZIDE) 37.5-25 MG Cap    stable on med.  refill meds   4. Vitamin D deficiency  VITAMIN D,25 HYDROXY    doesn't remember to take otc supplement " daily.  chg to 5000 units 2-3 x/wk.  jplan recheck lab in 6 months.    5. Morbid obesity with BMI of 40.0-44.9, adult (HCC)  Patient identified as having weight management issue.  Appropriate orders and counseling given.         Followup: Return in about 6 months (around 1/2/2020).

## 2019-08-07 ENCOUNTER — TELEPHONE (OUTPATIENT)
Dept: MEDICAL GROUP | Facility: MEDICAL CENTER | Age: 54
End: 2019-08-07

## 2019-08-08 NOTE — TELEPHONE ENCOUNTER
Please check with pt and see if she is going to do breast us and dx mammo that was ordered in march.

## 2019-10-15 ENCOUNTER — TELEPHONE (OUTPATIENT)
Dept: MEDICAL GROUP | Facility: MEDICAL CENTER | Age: 54
End: 2019-10-15

## 2019-10-15 NOTE — TELEPHONE ENCOUNTER
Please let pt know that she is overdue for mammo that was ordered in 3/19.  If she going to do it?  Do i need to reorder?  Is she still seeing me?

## 2020-05-20 ENCOUNTER — OFFICE VISIT (OUTPATIENT)
Dept: MEDICAL GROUP | Facility: MEDICAL CENTER | Age: 55
End: 2020-05-20
Payer: COMMERCIAL

## 2020-05-20 ENCOUNTER — TELEPHONE (OUTPATIENT)
Dept: MEDICAL GROUP | Facility: MEDICAL CENTER | Age: 55
End: 2020-05-20

## 2020-05-20 VITALS
TEMPERATURE: 97.1 F | SYSTOLIC BLOOD PRESSURE: 130 MMHG | WEIGHT: 270 LBS | BODY MASS INDEX: 47.84 KG/M2 | DIASTOLIC BLOOD PRESSURE: 86 MMHG | OXYGEN SATURATION: 99 % | HEART RATE: 83 BPM | HEIGHT: 63 IN

## 2020-05-20 DIAGNOSIS — N63.0 BREAST NODULE: ICD-10-CM

## 2020-05-20 DIAGNOSIS — I10 HYPERTENSION, ESSENTIAL: ICD-10-CM

## 2020-05-20 DIAGNOSIS — E78.5 HYPERLIPIDEMIA LDL GOAL <130: ICD-10-CM

## 2020-05-20 DIAGNOSIS — Z12.31 ENCOUNTER FOR SCREENING MAMMOGRAM FOR MALIGNANT NEOPLASM OF BREAST: ICD-10-CM

## 2020-05-20 DIAGNOSIS — E55.9 VITAMIN D DEFICIENCY: ICD-10-CM

## 2020-05-20 DIAGNOSIS — D50.8 OTHER IRON DEFICIENCY ANEMIA: ICD-10-CM

## 2020-05-20 DIAGNOSIS — N64.4 BREAST PAIN, RIGHT: ICD-10-CM

## 2020-05-20 DIAGNOSIS — E03.8 OTHER SPECIFIED HYPOTHYROIDISM: ICD-10-CM

## 2020-05-20 PROCEDURE — 99214 OFFICE O/P EST MOD 30 MIN: CPT | Performed by: NURSE PRACTITIONER

## 2020-05-20 RX ORDER — TRIAMTERENE AND HYDROCHLOROTHIAZIDE 37.5; 25 MG/1; MG/1
1 CAPSULE ORAL EVERY MORNING
Qty: 90 CAP | Refills: 3 | Status: SHIPPED | OUTPATIENT
Start: 2020-05-20 | End: 2020-07-14

## 2020-05-20 RX ORDER — LEVOTHYROXINE SODIUM 0.12 MG/1
TABLET ORAL
Qty: 102 TAB | Refills: 3 | Status: SHIPPED | OUTPATIENT
Start: 2020-05-20 | End: 2020-07-14

## 2020-05-20 ASSESSMENT — FIBROSIS 4 INDEX: FIB4 SCORE: 0.41

## 2020-05-20 ASSESSMENT — PATIENT HEALTH QUESTIONNAIRE - PHQ9: CLINICAL INTERPRETATION OF PHQ2 SCORE: 0

## 2020-05-20 NOTE — PROGRESS NOTES
Subjective:     Toyin Gallagher is a 54 y.o. female who presents with breast rt jpain.    HPI:   Seen in f/u for rt breast pain.  jpain in her rt breast started several weeks ago.  Pain only occurs with bending over.  No pain to palp.  No change in size of breast.  No personal or FH of breast cancer.   She has a hx of rt breast lump with benign bx in past.  She monitors that lump and it hasn't changed.  She hasn't felt any new nodules.    She is on maxzide for bp.  Bp sl up today but normally controlled.  Will be due updated lab in july.  She is going oot to move her daughter in june/july.  Will do lab before leaving.  Will f/u for PE/PAP after return in july.  Her last LP in 5/19 was all not at goal.  Will redo lab.  She is not on meds.  Not on healthy diet or exercising.  She has hx of low vitamin d and anemia.  Will check both vitamin d and cbc.   She has hypothyroidism.  Stable on meds.  Recheck lab with rest of ordered labs.     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   • Obesity (BMI 30-39.9) 12/14/2017   • Chronic cholecystitis 01/13/2017   • Iron deficiency anemia 11/30/2016   • Thrombocythemia (HCC) 11/30/2016   • Obesity (BMI 35.0-39.9 without comorbidity) 11/21/2016   • Hyperlipidemia LDL goal <130    • Vitamin D deficiency    • Hypothyroidism    • Hypertension        Current medicines (including changes today)  Current Outpatient Medications   Medication Sig Dispense Refill   • ASPIRIN 81 PO      • Cholecalciferol (VITAMIN D3) 1.25 MG (94258 UT) Tab      • levothyroxine (SYNTHROID) 125 MCG Tab Take 1 pill a day for 5 days/week and 1.5 pills a day for 2 days/week. 102 Tab 3   • triamterene/hctz (MAXZIDE-25/DYAZIDE) 37.5-25 MG Cap Take 1 Cap by mouth every morning. 90 Cap 3     No current facility-administered medications for this visit.        No Known  "Allergies    ROS  Constitutional: Negative. Negative for fever, chills, weight loss, malaise/fatigue and diaphoresis.   HENT: Negative. Negative for hearing loss, ear pain, nosebleeds, congestion, sore throat, neck pain, tinnitus and ear discharge.   Respiratory: Negative. Negative for cough, hemoptysis, sputum production, shortness of breath, wheezing and stridor.   Cardiovascular: Negative. Negative for chest pain, palpitations, orthopnea, claudication, leg swelling and PND.   Gastrointestinal: Denies nausea, vomiting, diarrhea, constipation, heartburn, melena or hematochezia.  Genitourinary: Denies dysuria, hematuria, urinary incontinence, frequency or urgency.        Objective:     /86 (BP Location: Right arm, Patient Position: Sitting)   Pulse 83   Temp 36.2 °C (97.1 °F) (Temporal)   Ht 1.6 m (5' 3\")   Wt 122.5 kg (270 lb)   SpO2 99%  Body mass index is 47.83 kg/m².    Physical Exam:  Vitals reviewed.  Constitutional: Oriented to person, place, and time. appears well-developed and well-nourished. No distress.   Cardiovascular: Normal rate, regular rhythm, normal heart sounds and intact distal pulses. Exam reveals no gallop and no friction rub. No murmur heard. No carotid bruits.   Pulmonary/Chest: Effort normal and breath sounds normal. No stridor. No respiratory distress. no wheezes or rales. exhibits no tenderness.   Musculoskeletal: Normal range of motion. exhibits no edema. chasidy pedal pulses 2+.  Lymphadenopathy: No cervical or supraclavicular adenopathy.   Neurological: Alert and oriented to person, place, and time. exhibits normal muscle tone.  Skin: Skin is warm and dry. No diaphoresis.   Psychiatric: Normal mood and affect. Behavior is normal.   Rt breast nodule noted at 1200.  Pain with bending over in rt breast at 1500.  No reddness, palp nodule noted.  No nipple dg noted.       Assessment and Plan:     The following treatment plan was discussed:    1. Breast pain, right  MA DIAGNOSTIC MAMMO " BILAT W/CAD    US-BREAST LIMITED-RIGHT    new breast pain at 1500.  check chasidy dx mammo and rt breast us.  f/u with pt with results.    2. Breast nodule  MA DIAGNOSTIC MAMMO BILAT W/CAD    US-BREAST LIMITED-RIGHT    chronic on rt.  last years mammo & us on left also showed calcifications.  6 mo f/u recommended.  she is overdue for that.    3. Other specified hypothyroidism  levothyroxine (SYNTHROID) 125 MCG Tab    TSH    FREE THYROXINE    refill meds.  stable on meds. do lab and f/u 7/2020   4. Hypertension, essential  triamterene/hctz (MAXZIDE-25/DYAZIDE) 37.5-25 MG Cap    stable on med.  refill meds  recheck lab and f/u for review 7/2020   5. Vitamin D deficiency  VITAMIN D,25 HYDROXY    will recheck lab 7/2020.  not on supplement   6. Other iron deficiency anemia  CBC WITH DIFFERENTIAL    hx of anemia.  will recheck cbc   7. Hyperlipidemia LDL goal <130  Comp Metabolic Panel    LP - PLA2    LipoFit by NMR    last LP all not at goal.  recheck lab with lipoprotin qt and LP-PLA2   8. Encounter for screening mammogram for malignant neoplasm of breast  MA DIAGNOSTIC MAMMO BILAT W/CAD    US-BREAST LIMITED-RIGHT         Followup: Return in about 2 months (around 7/20/2020).

## 2020-05-20 NOTE — TELEPHONE ENCOUNTER
Please mail cbc lab slip to pt.  i forgot to give to her during appt.  Let her know that i added on the test.

## 2020-05-28 ENCOUNTER — HOSPITAL ENCOUNTER (OUTPATIENT)
Dept: RADIOLOGY | Facility: MEDICAL CENTER | Age: 55
End: 2020-05-28
Attending: NURSE PRACTITIONER
Payer: COMMERCIAL

## 2020-05-28 ENCOUNTER — HOSPITAL ENCOUNTER (OUTPATIENT)
Dept: LAB | Facility: MEDICAL CENTER | Age: 55
End: 2020-05-28
Attending: NURSE PRACTITIONER
Payer: COMMERCIAL

## 2020-05-28 DIAGNOSIS — N63.0 BREAST NODULE: ICD-10-CM

## 2020-05-28 DIAGNOSIS — Z12.31 ENCOUNTER FOR SCREENING MAMMOGRAM FOR MALIGNANT NEOPLASM OF BREAST: ICD-10-CM

## 2020-05-28 DIAGNOSIS — E03.8 OTHER SPECIFIED HYPOTHYROIDISM: ICD-10-CM

## 2020-05-28 DIAGNOSIS — E78.5 HYPERLIPIDEMIA LDL GOAL <130: ICD-10-CM

## 2020-05-28 DIAGNOSIS — E55.9 VITAMIN D DEFICIENCY: ICD-10-CM

## 2020-05-28 DIAGNOSIS — D50.8 OTHER IRON DEFICIENCY ANEMIA: ICD-10-CM

## 2020-05-28 DIAGNOSIS — N64.4 BREAST PAIN, RIGHT: ICD-10-CM

## 2020-05-28 LAB
25(OH)D3 SERPL-MCNC: 20 NG/ML (ref 30–100)
ALBUMIN SERPL BCP-MCNC: 3.8 G/DL (ref 3.2–4.9)
ALBUMIN/GLOB SERPL: 1.2 G/DL
ALP SERPL-CCNC: 61 U/L (ref 30–99)
ALT SERPL-CCNC: 16 U/L (ref 2–50)
ANION GAP SERPL CALC-SCNC: 13 MMOL/L (ref 7–16)
AST SERPL-CCNC: 21 U/L (ref 12–45)
BASOPHILS # BLD AUTO: 0.7 % (ref 0–1.8)
BASOPHILS # BLD: 0.04 K/UL (ref 0–0.12)
BILIRUB SERPL-MCNC: 0.3 MG/DL (ref 0.1–1.5)
BUN SERPL-MCNC: 14 MG/DL (ref 8–22)
CALCIUM SERPL-MCNC: 8.9 MG/DL (ref 8.5–10.5)
CHLORIDE SERPL-SCNC: 102 MMOL/L (ref 96–112)
CO2 SERPL-SCNC: 24 MMOL/L (ref 20–33)
CREAT SERPL-MCNC: 0.72 MG/DL (ref 0.5–1.4)
EOSINOPHIL # BLD AUTO: 0.18 K/UL (ref 0–0.51)
EOSINOPHIL NFR BLD: 2.9 % (ref 0–6.9)
ERYTHROCYTE [DISTWIDTH] IN BLOOD BY AUTOMATED COUNT: 46.1 FL (ref 35.9–50)
GLOBULIN SER CALC-MCNC: 3.2 G/DL (ref 1.9–3.5)
GLUCOSE SERPL-MCNC: 84 MG/DL (ref 65–99)
HCT VFR BLD AUTO: 46 % (ref 37–47)
HGB BLD-MCNC: 14.5 G/DL (ref 12–16)
IMM GRANULOCYTES # BLD AUTO: 0.02 K/UL (ref 0–0.11)
IMM GRANULOCYTES NFR BLD AUTO: 0.3 % (ref 0–0.9)
LYMPHOCYTES # BLD AUTO: 1.78 K/UL (ref 1–4.8)
LYMPHOCYTES NFR BLD: 29.1 % (ref 22–41)
MCH RBC QN AUTO: 28.3 PG (ref 27–33)
MCHC RBC AUTO-ENTMCNC: 31.5 G/DL (ref 33.6–35)
MCV RBC AUTO: 89.8 FL (ref 81.4–97.8)
MONOCYTES # BLD AUTO: 0.58 K/UL (ref 0–0.85)
MONOCYTES NFR BLD AUTO: 9.5 % (ref 0–13.4)
NEUTROPHILS # BLD AUTO: 3.51 K/UL (ref 2–7.15)
NEUTROPHILS NFR BLD: 57.5 % (ref 44–72)
NRBC # BLD AUTO: 0 K/UL
NRBC BLD-RTO: 0 /100 WBC
PLATELET # BLD AUTO: 394 K/UL (ref 164–446)
PMV BLD AUTO: 10.7 FL (ref 9–12.9)
POTASSIUM SERPL-SCNC: 3.8 MMOL/L (ref 3.6–5.5)
PROT SERPL-MCNC: 7 G/DL (ref 6–8.2)
RBC # BLD AUTO: 5.12 M/UL (ref 4.2–5.4)
SODIUM SERPL-SCNC: 139 MMOL/L (ref 135–145)
T4 FREE SERPL-MCNC: 1.48 NG/DL (ref 0.93–1.7)
TSH SERPL DL<=0.005 MIU/L-ACNC: 0.62 UIU/ML (ref 0.38–5.33)
WBC # BLD AUTO: 6.1 K/UL (ref 4.8–10.8)

## 2020-05-28 PROCEDURE — 84439 ASSAY OF FREE THYROXINE: CPT

## 2020-05-28 PROCEDURE — 76642 ULTRASOUND BREAST LIMITED: CPT | Mod: RT

## 2020-05-28 PROCEDURE — 80061 LIPID PANEL: CPT

## 2020-05-28 PROCEDURE — 36415 COLL VENOUS BLD VENIPUNCTURE: CPT

## 2020-05-28 PROCEDURE — 80053 COMPREHEN METABOLIC PANEL: CPT

## 2020-05-28 PROCEDURE — 83704 LIPOPROTEIN BLD QUAN PART: CPT

## 2020-05-28 PROCEDURE — 82306 VITAMIN D 25 HYDROXY: CPT

## 2020-05-28 PROCEDURE — 84443 ASSAY THYROID STIM HORMONE: CPT

## 2020-05-28 PROCEDURE — G0279 TOMOSYNTHESIS, MAMMO: HCPCS

## 2020-05-28 PROCEDURE — 85025 COMPLETE CBC W/AUTO DIFF WBC: CPT

## 2020-06-01 ENCOUNTER — TELEPHONE (OUTPATIENT)
Dept: MEDICAL GROUP | Facility: MEDICAL CENTER | Age: 55
End: 2020-06-01

## 2020-06-01 LAB
CHOLEST SERPL-MCNC: 245 MG/DL
HDL PARTICAL NO Q4363: 27.1 UMOL/L
HDL SIZE Q4361: 8.6 NM
HDLC SERPL-MCNC: 38 MG/DL (ref 40–59)
HLD.LARGE SERPL-SCNC: <2.8 UMOL/L
L VLDL PART NO Q4357: 4.4 NMOL/L
LDL SERPL QN: 20.4 NM
LDL SERPL-SCNC: 2246 NMOL/L
LDL SMALL SERPL-SCNC: >1085 NMOL/L
LDLC SERPL CALC-MCNC: 169 MG/DL
PATHOLOGY STUDY: ABNORMAL
TRIGL SERPL-MCNC: 189 MG/DL (ref 30–149)
VLDL SIZE Q4362: 44.5 NM

## 2020-06-01 NOTE — LETTER
June 1, 2020        Toyin Gallagher  [address]  [place] [address]        Dear Toyin:    After careful review of your chart, we have noted you are due for a follow up appointment.  We request you call our office at [phone] at your earliest convenience and make an appointment.     We look forward to scheduling an appointment for you, so that we may provide you with the safest and most complete medical care.      If you have any questions or concerns, please don't hesitate to call.        Sincerely,        DANIELLE Thornton.    Electronically Signed

## 2020-06-01 NOTE — TELEPHONE ENCOUNTER
----- Message from JEFRY Thornton sent at 5/30/2020  2:47 PM PDT -----  Please have pt set appointment to review and discuss treatment for labs.

## 2020-06-05 ENCOUNTER — TELEPHONE (OUTPATIENT)
Dept: MEDICAL GROUP | Facility: MEDICAL CENTER | Age: 55
End: 2020-06-05

## 2020-06-05 NOTE — TELEPHONE ENCOUNTER
----- Message from JEFRY Thornton sent at 6/4/2020  5:40 PM PDT -----  Please have pt set appointment to review and discuss treatment for labs.

## 2020-06-05 NOTE — LETTER
June 8, 2020        Toyin Gallagher  [address]  [place] [address]        Dear Toyin:    After careful review of your chart, we have noted you are due for a follow up appointment.  We request you call our office at [phone] at your earliest convenience and make an appointment.     We look forward to scheduling an appointment for you, so that we may provide you with the safest and most complete medical care.        If you have any questions or concerns, please don't hesitate to call.        Sincerely,        DANIELLE Thornton.    Electronically Signed

## 2020-07-15 ENCOUNTER — OFFICE VISIT (OUTPATIENT)
Dept: MEDICAL GROUP | Facility: MEDICAL CENTER | Age: 55
End: 2020-07-15
Payer: COMMERCIAL

## 2020-07-15 ENCOUNTER — HOSPITAL ENCOUNTER (OUTPATIENT)
Facility: MEDICAL CENTER | Age: 55
End: 2020-07-15
Attending: NURSE PRACTITIONER
Payer: COMMERCIAL

## 2020-07-15 VITALS
TEMPERATURE: 97.2 F | BODY MASS INDEX: 47.66 KG/M2 | HEART RATE: 73 BPM | HEIGHT: 63 IN | DIASTOLIC BLOOD PRESSURE: 74 MMHG | WEIGHT: 269 LBS | SYSTOLIC BLOOD PRESSURE: 128 MMHG | OXYGEN SATURATION: 95 %

## 2020-07-15 DIAGNOSIS — E03.8 OTHER SPECIFIED HYPOTHYROIDISM: ICD-10-CM

## 2020-07-15 DIAGNOSIS — Z12.72 SMEAR, VAGINAL, AS PART OF ROUTINE GYNECOLOGICAL EXAMINATION: ICD-10-CM

## 2020-07-15 DIAGNOSIS — Z01.419 SMEAR, VAGINAL, AS PART OF ROUTINE GYNECOLOGICAL EXAMINATION: ICD-10-CM

## 2020-07-15 DIAGNOSIS — Z12.4 ROUTINE CERVICAL SMEAR: ICD-10-CM

## 2020-07-15 DIAGNOSIS — I10 HYPERTENSION, ESSENTIAL: ICD-10-CM

## 2020-07-15 DIAGNOSIS — Z12.11 SCREENING FOR MALIGNANT NEOPLASM OF COLON: ICD-10-CM

## 2020-07-15 DIAGNOSIS — E55.9 VITAMIN D DEFICIENCY: ICD-10-CM

## 2020-07-15 DIAGNOSIS — E78.5 HYPERLIPIDEMIA LDL GOAL <100: ICD-10-CM

## 2020-07-15 PROCEDURE — 88175 CYTOPATH C/V AUTO FLUID REDO: CPT

## 2020-07-15 PROCEDURE — 87624 HPV HI-RISK TYP POOLED RSLT: CPT

## 2020-07-15 PROCEDURE — 99396 PREV VISIT EST AGE 40-64: CPT | Performed by: NURSE PRACTITIONER

## 2020-07-15 RX ORDER — ATORVASTATIN CALCIUM 10 MG/1
10 TABLET, FILM COATED ORAL
Qty: 8 TAB | Refills: 2 | Status: SHIPPED | OUTPATIENT
Start: 2020-07-15 | End: 2020-09-30 | Stop reason: SDUPTHER

## 2020-07-15 RX ORDER — LEVOTHYROXINE SODIUM 0.12 MG/1
TABLET ORAL
Qty: 90 TAB | Refills: 3 | Status: SHIPPED | OUTPATIENT
Start: 2020-07-15 | End: 2021-04-07

## 2020-07-15 RX ORDER — TRIAMTERENE AND HYDROCHLOROTHIAZIDE 37.5; 25 MG/1; MG/1
CAPSULE ORAL
Qty: 90 CAP | Refills: 3 | Status: SHIPPED | OUTPATIENT
Start: 2020-07-15 | End: 2021-06-01 | Stop reason: SDUPTHER

## 2020-07-15 RX ORDER — ERGOCALCIFEROL 1.25 MG/1
50000 CAPSULE ORAL
Qty: 12 CAP | Refills: 0 | Status: SHIPPED
Start: 2020-07-15 | End: 2021-06-01

## 2020-07-15 ASSESSMENT — FIBROSIS 4 INDEX: FIB4 SCORE: 0.69

## 2020-07-15 NOTE — PROGRESS NOTES
Subjective:      Toyin Gallagher is a 54 y.o. female who presents with pap smear/PE          HPI  Seen in f/u for PE/pap smear.  She is feeling well.    She has a hx of fe def.  She saw hematology and had fe infusions.  She has had heavy menses her whole life.  Now she is only having occas periods.  They thought that was the cause of her fe def.  They did complete w/u for bleed that was neg.  She has a hx of elevated plts also.  That is lifelong.   Reviewed lab with pt.  Her GFR, T4, TSH, CBC, CMP is wnl.  She is stable on synthroid.  Needs meds refilled.   Vitamin D is low at 20.  This is despite taking 2000 units d3 daily.  LIPOFIT shows trg elevated at 189.  HDL low at 38 but up from 36.    LDL is high at 169.  Goal is <100.  LDL particles is 2246.   Her father had hx of rhabdo on statin.  She has not praeviously been on statin.   She is on healthy diiet.  Trying to exercise regularly.    Bp is stable and controlled on meds.  Taking meds approp.  Needs meds refilled.      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   • Obesity (BMI 30-39.9) 12/14/2017   • Chronic cholecystitis 01/13/2017   • Iron deficiency anemia 11/30/2016   • Thrombocythemia (HCC) 11/30/2016   • Obesity (BMI 35.0-39.9 without comorbidity) 11/21/2016   • Hyperlipidemia LDL goal <130    • Vitamin D deficiency    • Hypothyroidism    • Hypertension      Current Outpatient Medications   Medication Sig Dispense Refill   • levothyroxine (SYNTHROID) 125 MCG Tab TAKE 1 TABLET BY MOUTH ONCE A DAY 5 DAYS PER WEEK AND ONE AND ONE-HALF TABLETS A DAY 2 DAYS PER WEEK 30 Tab 0   • triamterene/hctz (MAXZIDE-25/DYAZIDE) 37.5-25 MG Cap TAKE ONE CAPSULE BY MOUTH EVERY MORNING 30 Cap 0   • ASPIRIN 81 PO      • Cholecalciferol (VITAMIN D3) 1.25 MG (34831 UT) Tab        No current facility-administered medications for this  visit.      Current Outpatient Medications   Medication Sig Dispense Refill   • levothyroxine (SYNTHROID) 125 MCG Tab TAKE 1 TABLET BY MOUTH ONCE A DAY 5 DAYS PER WEEK AND ONE AND ONE-HALF TABLETS A DAY 2 DAYS PER WEEK 30 Tab 0   • triamterene/hctz (MAXZIDE-25/DYAZIDE) 37.5-25 MG Cap TAKE ONE CAPSULE BY MOUTH EVERY MORNING 30 Cap 0   • ASPIRIN 81 PO      • Cholecalciferol (VITAMIN D3) 1.25 MG (10889 UT) Tab        No current facility-administered medications for this visit.      Patient has no known allergies.      ROS  Review of Systems   Constitutional: Negative.  Negative for fever, chills, weight loss, malaise/fatigue and diaphoresis.   HENT: Negative.  Negative for hearing loss, ear pain, nosebleeds, congestion, sore throat, neck pain, tinnitus and ear discharge.    Eyes: Negative.  Negative for blurred vision, double vision, photophobia, pain, discharge and redness.   Respiratory: Negative.  Negative for cough, hemoptysis, sputum production, shortness of breath, wheezing and stridor.    Cardiovascular: Negative.  Negative for chest pain, palpitations, orthopnea, claudication, leg swelling and PND.   Gastrointestinal: Negative.  Negative for heartburn, nausea, vomiting, abdominal pain, diarrhea, constipation, blood in stool and melena.   Genitourinary: Negative.  Negative for dysuria, urgency, frequency, incontinence, hematuria and flank pain.   Musculoskeletal: Negative.  Negative for myalgias, back pain, joint pain and falls.  chronic rt breast pain occas w/neg w/u that is improving.   Skin: Negative.  Negative for itching and rash.   Neurological: Negative.  Negative for dizziness, tingling, tremors, sensory change, speech change, focal weakness, seizures, loss of consciousness, weakness and headaches.   Endo/Heme/Allergies: Negative.  Negative for environmental allergies and polydipsia. Does not bruise/bleed easily.   Psychiatric/Behavioral: Negative.  Negative for depression, suicidal ideas,  "hallucinations, memory loss and substance abuse. The patient is not nervous/anxious and does not have insomnia.    All other systems reviewed and are negative.           Objective:     /74 (BP Location: Right arm, Patient Position: Sitting)   Pulse 73   Temp 36.2 °C (97.2 °F) (Temporal)   Ht 1.6 m (5' 3\")   Wt 122 kg (269 lb)   SpO2 95%   BMI 47.65 kg/m²      Physical Exam      Physical Exam   Vitals reviewed.  Constitutional: oriented to person, place, and time. appears well-developed and well-nourished. No distress.   HENT: Head: Normocephalic and atraumatic. Bilateral tympanic membranes wnl w/o bulging.  Right Ear: External ear normal. Left Ear: External ear normal. Nose: Nose normal.  Mouth/Throat: Oropharynx is clear and moist. No oropharyngeal exudate. chasidy tm wnl. Eyes: Conjunctivae and EOM are normal. Pupils are equal, round, and reactive to light. Right eye exhibits no discharge. Left eye exhibits no discharge. No scleral icterus.    Neck: Normal range of motion. Neck supple. No JVD present.   Cardiovascular: Normal rate, regular rhythm, normal heart sounds and intact distal pulses.  Exam reveals no gallop and no friction rub.  No murmur heard.  No carotid bruits   Pulmonary/Chest: Effort normal and breath sounds normal. No stridor. No respiratory distress. no wheezes or rales. exhibits no tenderness.   Abdominal: Soft. Bowel sounds are normal. exhibits no distension and no mass. No tenderness. no rebound and no guarding.   Musculoskeletal: Normal range of motion. exhibits no edema or tenderness.  chasidy pedal pulses 2+.  Lymphadenopathy:  no cervical or supraclavicular adenopathy.   Neurological: alert and oriented to person, place, and time. has normal reflexes. displays normal reflexes. No cranial nerve deficit. exhibits normal muscle tone. Coordination normal.   Skin: Skin is warm and dry. No rash noted. no diaphoresis. No erythema. No pallor.   Psychiatric: normal mood and affect. behavior is " normal.   SUBJECTIVE: 54 y.o. female for annual routine pap and checkup.  Gyn History:   Last Pap: 11/3/2016  Contraception: none  H/O Abnormal Pap no  H/O STI none  Current partner: none  Lmp: 2020  ROS:  Mensesirregular with some excessive bleeding.  No pelvic pain, vaginal discharge or dyspareunia.  No breast tenderness, mass, nipple discharge, changes in size or contour, or abnormal cyclic discomfort.  Breast Exam:Performed with instruction during examination. Breasts equal size and shape.  No axillary lymphadenopathy, no skin changes, no dominant masses. No nipple retraction  Pelvic Exam - Sure Path Pap obtained and specimen sent to lab. Normal external genitalia with no lesions. Normal vaginal mucosa with normal rugation. Cervix has no visible lesions. No cervical motion tenderness. Uterus is normal sized with no masses. No adnexal tenderness or enlargement appreciated.      Assessment/Plan:     1. Smear, vaginal, as part of routine gynecological examination  THINPREP PAP WITH HPV    f/u with pt with results.  then f/u after lab in 6 wks.   2. Routine cervical smear  THINPREP PAP WITH HPV   3. Hyperlipidemia LDL goal <100  atorvastatin (LIPITOR) 10 MG Tab    Comp Metabolic Panel    Lipid Profile    CREATINE KINASE    LP NOT AT GOAL.  start lipitor 10 mg 2x/wk.  recheck lab in 6 wks.  /fu forreivew   4. Other specified hypothyroidism  levothyroxine (SYNTHROID) 125 MCG Tab    refill meds.  stable on meds. do lab and f/u 2020   5. Hypertension, essential  triamterene/hctz (MAXZIDE-25/DYAZIDE) 37.5-25 MG Cap    stable on med.  refill meds    6. Vitamin D deficiency  vitamin D, Ergocalciferol, (DRISDOL) 1.25 MG (64408 UT) Cap capsule    take ergocalciferol x 12 wks then 4000 units daily since  units did not take d to normal   7. Screening for malignant neoplasm of colon  COLOGUARD (FIT DNA)    cologuard

## 2020-07-16 LAB
CYTOLOGY REG CYTOL: NORMAL
HPV HR 12 DNA CVX QL NAA+PROBE: NEGATIVE
HPV16 DNA SPEC QL NAA+PROBE: NEGATIVE
HPV18 DNA SPEC QL NAA+PROBE: NEGATIVE
SPECIMEN SOURCE: NORMAL

## 2020-07-19 RX ORDER — METRONIDAZOLE 500 MG/1
500 TABLET ORAL 3 TIMES DAILY
Qty: 30 TAB | Refills: 0 | Status: SHIPPED
Start: 2020-07-19 | End: 2020-09-30

## 2020-09-11 ENCOUNTER — HOSPITAL ENCOUNTER (OUTPATIENT)
Dept: LAB | Facility: MEDICAL CENTER | Age: 55
End: 2020-09-11
Attending: NURSE PRACTITIONER
Payer: COMMERCIAL

## 2020-09-11 DIAGNOSIS — E78.5 HYPERLIPIDEMIA LDL GOAL <100: ICD-10-CM

## 2020-09-11 LAB
ALBUMIN SERPL BCP-MCNC: 3.6 G/DL (ref 3.2–4.9)
ALBUMIN/GLOB SERPL: 1.1 G/DL
ALP SERPL-CCNC: 66 U/L (ref 30–99)
ALT SERPL-CCNC: 12 U/L (ref 2–50)
ANION GAP SERPL CALC-SCNC: 9 MMOL/L (ref 7–16)
AST SERPL-CCNC: 12 U/L (ref 12–45)
BILIRUB SERPL-MCNC: 0.4 MG/DL (ref 0.1–1.5)
BUN SERPL-MCNC: 11 MG/DL (ref 8–22)
CALCIUM SERPL-MCNC: 8.7 MG/DL (ref 8.5–10.5)
CHLORIDE SERPL-SCNC: 99 MMOL/L (ref 96–112)
CHOLEST SERPL-MCNC: 183 MG/DL (ref 100–199)
CK SERPL-CCNC: 50 U/L (ref 0–154)
CO2 SERPL-SCNC: 29 MMOL/L (ref 20–33)
CREAT SERPL-MCNC: 0.76 MG/DL (ref 0.5–1.4)
FASTING STATUS PATIENT QL REPORTED: NORMAL
GLOBULIN SER CALC-MCNC: 3.3 G/DL (ref 1.9–3.5)
GLUCOSE SERPL-MCNC: 98 MG/DL (ref 65–99)
HDLC SERPL-MCNC: 41 MG/DL
LDLC SERPL CALC-MCNC: 108 MG/DL
POTASSIUM SERPL-SCNC: 4 MMOL/L (ref 3.6–5.5)
PROT SERPL-MCNC: 6.9 G/DL (ref 6–8.2)
SODIUM SERPL-SCNC: 137 MMOL/L (ref 135–145)
TRIGL SERPL-MCNC: 171 MG/DL (ref 0–149)

## 2020-09-11 PROCEDURE — 36415 COLL VENOUS BLD VENIPUNCTURE: CPT

## 2020-09-11 PROCEDURE — 82550 ASSAY OF CK (CPK): CPT

## 2020-09-11 PROCEDURE — 80053 COMPREHEN METABOLIC PANEL: CPT

## 2020-09-11 PROCEDURE — 80061 LIPID PANEL: CPT

## 2020-09-14 ENCOUNTER — TELEPHONE (OUTPATIENT)
Dept: MEDICAL GROUP | Facility: MEDICAL CENTER | Age: 55
End: 2020-09-14

## 2020-09-14 NOTE — LETTER
September 14, 2020        Toyin Gallagher  [address]  [place] [address]        Dear Toyin:    After careful review of your chart, we have noted you are due for a follow up appointment.  We request you call our office at [phone] at your earliest convenience and make an appointment.     We look forward to scheduling an appointment for you, so that we may provide you with the safest and most complete medical care.        If you have any questions or concerns, please don't hesitate to call.        Sincerely,        DANIELLE Thornton.    Electronically Signed

## 2020-09-14 NOTE — TELEPHONE ENCOUNTER
----- Message from JEFRY Thornton sent at 9/12/2020 10:35 AM PDT -----  Please have pt set appointment to review and discuss treatment for labs.

## 2020-09-30 ENCOUNTER — OFFICE VISIT (OUTPATIENT)
Dept: MEDICAL GROUP | Facility: MEDICAL CENTER | Age: 55
End: 2020-09-30
Payer: COMMERCIAL

## 2020-09-30 VITALS
DIASTOLIC BLOOD PRESSURE: 80 MMHG | TEMPERATURE: 97.2 F | WEIGHT: 268 LBS | SYSTOLIC BLOOD PRESSURE: 120 MMHG | BODY MASS INDEX: 47.48 KG/M2 | HEART RATE: 78 BPM | OXYGEN SATURATION: 98 % | HEIGHT: 63 IN

## 2020-09-30 DIAGNOSIS — E66.01 MORBID OBESITY WITH BMI OF 45.0-49.9, ADULT (HCC): ICD-10-CM

## 2020-09-30 DIAGNOSIS — Z23 NEED FOR INFLUENZA VACCINATION: ICD-10-CM

## 2020-09-30 DIAGNOSIS — E78.5 HYPERLIPIDEMIA LDL GOAL <100: ICD-10-CM

## 2020-09-30 PROCEDURE — 99214 OFFICE O/P EST MOD 30 MIN: CPT | Mod: 25 | Performed by: NURSE PRACTITIONER

## 2020-09-30 PROCEDURE — 90471 IMMUNIZATION ADMIN: CPT | Performed by: NURSE PRACTITIONER

## 2020-09-30 PROCEDURE — 90686 IIV4 VACC NO PRSV 0.5 ML IM: CPT | Performed by: NURSE PRACTITIONER

## 2020-09-30 RX ORDER — ATORVASTATIN CALCIUM 10 MG/1
10 TABLET, FILM COATED ORAL
Qty: 24 TAB | Refills: 3 | Status: SHIPPED | OUTPATIENT
Start: 2020-09-30 | End: 2020-10-13 | Stop reason: SDUPTHER

## 2020-09-30 ASSESSMENT — FIBROSIS 4 INDEX: FIB4 SCORE: 0.46

## 2020-09-30 NOTE — PROGRESS NOTES
Subjective:     Toyin Gallagher is a 54 y.o. female who presents with hyperlipidemia.    HPI:   Seen in f/u for hyperlipidemia.  She was started on lipitor every 72 hrs at last appt.  No s/e noted.  Taking med approp. She feels that she is on a healthy diet.  She is not exercising much but walking.  Has limited activities with the smoke.    Reviewed lab with pt.  Her CMP, CPK, GFR is wnl  LP shows dramatic improvement.   trg down from 189 to 171.  HDL up from 38 to 41.  LDL is down from 169 to 108. Goal is <100.    She is due a flu shot.    Patient Active Problem List    Diagnosis Date Noted   • Morbid obesity with BMI of 45.0-49.9, adult ([facility]) 09/30/2020   • Morbid obesity with BMI of 40.0-44.9, adult ([facility]) 07/02/2019   • Pseudoangiomatous stromal hyperplasia of breast 03/28/2018   • Obesity (BMI 30-39.9) 12/14/2017   • Chronic cholecystitis 01/13/2017   • Iron deficiency anemia 11/30/2016   • Thrombocythemia (HCC) 11/30/2016   • Obesity (BMI 35.0-39.9 without comorbidity) 11/21/2016   • Hyperlipidemia LDL goal <130    • Vitamin D deficiency    • Hypothyroidism    • Hypertension        Current medicines (including changes today)  Current Outpatient Medications   Medication Sig Dispense Refill   • atorvastatin (LIPITOR) 10 MG Tab Take 1 Tab by mouth every 72 hours. 24 Tab 3   • levothyroxine (SYNTHROID) 125 MCG Tab TAKE 1 TABLET BY MOUTH ONCE A DAY 5 DAYS PER WEEK AND ONE AND ONE-HALF TABLETS A DAY 2 DAYS PER WEEK 90 Tab 3   • triamterene/hctz (MAXZIDE-25/DYAZIDE) 37.5-25 MG Cap TAKE ONE CAPSULE BY MOUTH EVERY MORNING 90 Cap 3   • vitamin D, Ergocalciferol, (DRISDOL) 1.25 MG (92598 UT) Cap capsule Take 1 Cap by mouth every 7 days. 12 Cap 0   • ASPIRIN 81 PO        No current facility-administered medications for this visit.        No Known Allergies    ROS  Constitutional: Negative. Negative for fever, chills, weight loss, malaise/fatigue and diaphoresis.   HENT: Negative. Negative for hearing loss, ear pain,  "nosebleeds, congestion, sore throat, neck pain, tinnitus and ear discharge.   Respiratory: Negative. Negative for cough, hemoptysis, sputum production, shortness of breath, wheezing and stridor.   Cardiovascular: Negative. Negative for chest pain, palpitations, orthopnea, claudication, leg swelling and PND.   Gastrointestinal: Denies nausea, vomiting, diarrhea, constipation, heartburn, melena or hematochezia.  Genitourinary: Denies dysuria, hematuria, urinary incontinence, frequency or urgency.        Objective:     /80 (BP Location: Right arm, Patient Position: Sitting)   Pulse 78   Temp 36.2 °C (97.2 °F) (Temporal)   Ht 1.6 m (5' 3\")   Wt 121.6 kg (268 lb)   SpO2 98%  Body mass index is 47.47 kg/m².    Physical Exam:  Vitals reviewed.  Constitutional: Oriented to person, place, and time. appears well-developed and well-nourished. No distress.   Cardiovascular: Normal rate, regular rhythm, normal heart sounds and intact distal pulses. Exam reveals no gallop and no friction rub. No murmur heard. No carotid bruits.   Pulmonary/Chest: Effort normal and breath sounds normal. No stridor. No respiratory distress. no wheezes or rales. exhibits no tenderness.   Musculoskeletal: Normal range of motion. exhibits no edema. chasidy pedal pulses 2+.  Lymphadenopathy: No cervical or supraclavicular adenopathy.   Neurological: Alert and oriented to person, place, and time. exhibits normal muscle tone.  Skin: Skin is warm and dry. No diaphoresis.   Psychiatric: Normal mood and affect. Behavior is normal.      Assessment and Plan:     The following treatment plan was discussed:    1. Hyperlipidemia LDL goal <100  atorvastatin (LIPITOR) 10 MG Tab    LP almost at goal and significantly improved on every 72 hr lipitor.  refilled med.  stable on med.  f/u yearly. ; call for lab slip   2. Need for influenza vaccination  Influenza Vaccine Quad Injection (PF)   3. Morbid obesity with BMI of 45.0-49.9, adult (HCC)  Patient " identified as having weight management issue.  Appropriate orders and counseling given.         Followup: Return in about 1 year (around 9/30/2021).

## 2020-10-13 DIAGNOSIS — E78.5 HYPERLIPIDEMIA LDL GOAL <100: ICD-10-CM

## 2020-10-13 RX ORDER — ATORVASTATIN CALCIUM 10 MG/1
10 TABLET, FILM COATED ORAL
Qty: 24 TAB | Refills: 3 | Status: SHIPPED | OUTPATIENT
Start: 2020-10-13 | End: 2021-06-01 | Stop reason: SDUPTHER

## 2021-02-21 DIAGNOSIS — R25.2 LEG CRAMPING: ICD-10-CM

## 2021-02-26 ENCOUNTER — HOSPITAL ENCOUNTER (OUTPATIENT)
Dept: LAB | Facility: MEDICAL CENTER | Age: 56
End: 2021-02-26
Attending: NURSE PRACTITIONER
Payer: COMMERCIAL

## 2021-02-26 DIAGNOSIS — R25.2 LEG CRAMPING: ICD-10-CM

## 2021-02-26 LAB
ALBUMIN SERPL BCP-MCNC: 3.6 G/DL (ref 3.2–4.9)
ALBUMIN/GLOB SERPL: 1 G/DL
ALP SERPL-CCNC: 69 U/L (ref 30–99)
ALT SERPL-CCNC: 12 U/L (ref 2–50)
ANION GAP SERPL CALC-SCNC: 9 MMOL/L (ref 7–16)
AST SERPL-CCNC: 12 U/L (ref 12–45)
BASOPHILS # BLD AUTO: 0.6 % (ref 0–1.8)
BASOPHILS # BLD: 0.04 K/UL (ref 0–0.12)
BILIRUB SERPL-MCNC: 0.4 MG/DL (ref 0.1–1.5)
BUN SERPL-MCNC: 12 MG/DL (ref 8–22)
CALCIUM SERPL-MCNC: 9.1 MG/DL (ref 8.5–10.5)
CHLORIDE SERPL-SCNC: 100 MMOL/L (ref 96–112)
CK SERPL-CCNC: 58 U/L (ref 0–154)
CO2 SERPL-SCNC: 26 MMOL/L (ref 20–33)
CREAT SERPL-MCNC: 0.71 MG/DL (ref 0.5–1.4)
EOSINOPHIL # BLD AUTO: 0.26 K/UL (ref 0–0.51)
EOSINOPHIL NFR BLD: 3.8 % (ref 0–6.9)
ERYTHROCYTE [DISTWIDTH] IN BLOOD BY AUTOMATED COUNT: 47.7 FL (ref 35.9–50)
ERYTHROCYTE [SEDIMENTATION RATE] IN BLOOD BY WESTERGREN METHOD: 15 MM/HOUR (ref 0–30)
GLOBULIN SER CALC-MCNC: 3.6 G/DL (ref 1.9–3.5)
GLUCOSE SERPL-MCNC: 111 MG/DL (ref 65–99)
HCT VFR BLD AUTO: 44.8 % (ref 37–47)
HGB BLD-MCNC: 14.1 G/DL (ref 12–16)
IMM GRANULOCYTES # BLD AUTO: 0.02 K/UL (ref 0–0.11)
IMM GRANULOCYTES NFR BLD AUTO: 0.3 % (ref 0–0.9)
LYMPHOCYTES # BLD AUTO: 2.06 K/UL (ref 1–4.8)
LYMPHOCYTES NFR BLD: 30.1 % (ref 22–41)
MCH RBC QN AUTO: 27.3 PG (ref 27–33)
MCHC RBC AUTO-ENTMCNC: 31.5 G/DL (ref 33.6–35)
MCV RBC AUTO: 86.7 FL (ref 81.4–97.8)
MONOCYTES # BLD AUTO: 0.64 K/UL (ref 0–0.85)
MONOCYTES NFR BLD AUTO: 9.3 % (ref 0–13.4)
NEUTROPHILS # BLD AUTO: 3.83 K/UL (ref 2–7.15)
NEUTROPHILS NFR BLD: 55.9 % (ref 44–72)
NRBC # BLD AUTO: 0 K/UL
NRBC BLD-RTO: 0 /100 WBC
PLATELET # BLD AUTO: 443 K/UL (ref 164–446)
PMV BLD AUTO: 11 FL (ref 9–12.9)
POTASSIUM SERPL-SCNC: 3.8 MMOL/L (ref 3.6–5.5)
PROT SERPL-MCNC: 7.2 G/DL (ref 6–8.2)
RBC # BLD AUTO: 5.17 M/UL (ref 4.2–5.4)
SODIUM SERPL-SCNC: 135 MMOL/L (ref 135–145)
WBC # BLD AUTO: 6.9 K/UL (ref 4.8–10.8)

## 2021-02-26 PROCEDURE — 85652 RBC SED RATE AUTOMATED: CPT

## 2021-02-26 PROCEDURE — 85025 COMPLETE CBC W/AUTO DIFF WBC: CPT

## 2021-02-26 PROCEDURE — 80053 COMPREHEN METABOLIC PANEL: CPT

## 2021-02-26 PROCEDURE — 82550 ASSAY OF CK (CPK): CPT

## 2021-02-26 PROCEDURE — 36415 COLL VENOUS BLD VENIPUNCTURE: CPT

## 2021-02-27 ENCOUNTER — TELEPHONE (OUTPATIENT)
Dept: MEDICAL GROUP | Facility: MEDICAL CENTER | Age: 56
End: 2021-02-27

## 2021-02-28 NOTE — TELEPHONE ENCOUNTER
Please let pt know that the lab is wnl except her glucose is elevated.  That is not unusual since she wasn't fasting

## 2021-04-07 DIAGNOSIS — E03.8 OTHER SPECIFIED HYPOTHYROIDISM: ICD-10-CM

## 2021-04-07 RX ORDER — LEVOTHYROXINE SODIUM 0.12 MG/1
TABLET ORAL
Qty: 90 TABLET | Refills: 2 | Status: SHIPPED | OUTPATIENT
Start: 2021-04-07 | End: 2021-06-01 | Stop reason: SDUPTHER

## 2021-05-17 DIAGNOSIS — I10 ESSENTIAL HYPERTENSION: ICD-10-CM

## 2021-05-17 DIAGNOSIS — E55.9 VITAMIN D DEFICIENCY: ICD-10-CM

## 2021-05-17 DIAGNOSIS — D50.8 OTHER IRON DEFICIENCY ANEMIA: ICD-10-CM

## 2021-05-17 DIAGNOSIS — E78.5 HYPERLIPIDEMIA LDL GOAL <130: ICD-10-CM

## 2021-05-17 DIAGNOSIS — E03.9 ACQUIRED HYPOTHYROIDISM: ICD-10-CM

## 2021-05-21 ENCOUNTER — HOSPITAL ENCOUNTER (OUTPATIENT)
Dept: LAB | Facility: MEDICAL CENTER | Age: 56
End: 2021-05-21
Attending: NURSE PRACTITIONER
Payer: COMMERCIAL

## 2021-05-21 DIAGNOSIS — D50.8 OTHER IRON DEFICIENCY ANEMIA: ICD-10-CM

## 2021-05-21 DIAGNOSIS — E55.9 VITAMIN D DEFICIENCY: ICD-10-CM

## 2021-05-21 DIAGNOSIS — I10 ESSENTIAL HYPERTENSION: ICD-10-CM

## 2021-05-21 DIAGNOSIS — E03.9 ACQUIRED HYPOTHYROIDISM: ICD-10-CM

## 2021-05-21 DIAGNOSIS — E78.5 HYPERLIPIDEMIA LDL GOAL <130: ICD-10-CM

## 2021-05-21 LAB
ALBUMIN SERPL BCP-MCNC: 3.8 G/DL (ref 3.2–4.9)
ALBUMIN/GLOB SERPL: 1.2 G/DL
ALP SERPL-CCNC: 74 U/L (ref 30–99)
ALT SERPL-CCNC: 15 U/L (ref 2–50)
ANION GAP SERPL CALC-SCNC: 7 MMOL/L (ref 7–16)
AST SERPL-CCNC: 16 U/L (ref 12–45)
BASOPHILS # BLD AUTO: 0.6 % (ref 0–1.8)
BASOPHILS # BLD: 0.05 K/UL (ref 0–0.12)
BILIRUB SERPL-MCNC: 0.5 MG/DL (ref 0.1–1.5)
BUN SERPL-MCNC: 10 MG/DL (ref 8–22)
CALCIUM SERPL-MCNC: 9.4 MG/DL (ref 8.5–10.5)
CHLORIDE SERPL-SCNC: 99 MMOL/L (ref 96–112)
CHOLEST SERPL-MCNC: 180 MG/DL (ref 100–199)
CO2 SERPL-SCNC: 30 MMOL/L (ref 20–33)
CREAT SERPL-MCNC: 0.73 MG/DL (ref 0.5–1.4)
CREAT UR-MCNC: 108.03 MG/DL
EOSINOPHIL # BLD AUTO: 0.15 K/UL (ref 0–0.51)
EOSINOPHIL NFR BLD: 1.7 % (ref 0–6.9)
ERYTHROCYTE [DISTWIDTH] IN BLOOD BY AUTOMATED COUNT: 47.5 FL (ref 35.9–50)
FASTING STATUS PATIENT QL REPORTED: NORMAL
GLOBULIN SER CALC-MCNC: 3.3 G/DL (ref 1.9–3.5)
GLUCOSE SERPL-MCNC: 99 MG/DL (ref 65–99)
HCT VFR BLD AUTO: 45.9 % (ref 37–47)
HDLC SERPL-MCNC: 35 MG/DL
HGB BLD-MCNC: 14.6 G/DL (ref 12–16)
IMM GRANULOCYTES # BLD AUTO: 0.02 K/UL (ref 0–0.11)
IMM GRANULOCYTES NFR BLD AUTO: 0.2 % (ref 0–0.9)
LDLC SERPL CALC-MCNC: 97 MG/DL
LYMPHOCYTES # BLD AUTO: 1.98 K/UL (ref 1–4.8)
LYMPHOCYTES NFR BLD: 23.1 % (ref 22–41)
MCH RBC QN AUTO: 27.9 PG (ref 27–33)
MCHC RBC AUTO-ENTMCNC: 31.8 G/DL (ref 33.6–35)
MCV RBC AUTO: 87.6 FL (ref 81.4–97.8)
MICROALBUMIN UR-MCNC: <1.2 MG/DL
MICROALBUMIN/CREAT UR: NORMAL MG/G (ref 0–30)
MONOCYTES # BLD AUTO: 0.63 K/UL (ref 0–0.85)
MONOCYTES NFR BLD AUTO: 7.3 % (ref 0–13.4)
NEUTROPHILS # BLD AUTO: 5.76 K/UL (ref 2–7.15)
NEUTROPHILS NFR BLD: 67.1 % (ref 44–72)
NRBC # BLD AUTO: 0 K/UL
NRBC BLD-RTO: 0 /100 WBC
PLATELET # BLD AUTO: 415 K/UL (ref 164–446)
PMV BLD AUTO: 10.3 FL (ref 9–12.9)
POTASSIUM SERPL-SCNC: 3.7 MMOL/L (ref 3.6–5.5)
PROT SERPL-MCNC: 7.1 G/DL (ref 6–8.2)
RBC # BLD AUTO: 5.24 M/UL (ref 4.2–5.4)
SODIUM SERPL-SCNC: 136 MMOL/L (ref 135–145)
T4 FREE SERPL-MCNC: 1.25 NG/DL (ref 0.93–1.7)
TRIGL SERPL-MCNC: 242 MG/DL (ref 0–149)
TSH SERPL DL<=0.005 MIU/L-ACNC: 2.27 UIU/ML (ref 0.38–5.33)
WBC # BLD AUTO: 8.6 K/UL (ref 4.8–10.8)

## 2021-05-21 PROCEDURE — 80061 LIPID PANEL: CPT

## 2021-05-21 PROCEDURE — 82570 ASSAY OF URINE CREATININE: CPT

## 2021-05-21 PROCEDURE — 84443 ASSAY THYROID STIM HORMONE: CPT

## 2021-05-21 PROCEDURE — 82043 UR ALBUMIN QUANTITATIVE: CPT

## 2021-05-21 PROCEDURE — 84439 ASSAY OF FREE THYROXINE: CPT

## 2021-05-21 PROCEDURE — 82306 VITAMIN D 25 HYDROXY: CPT

## 2021-05-21 PROCEDURE — 80053 COMPREHEN METABOLIC PANEL: CPT

## 2021-05-21 PROCEDURE — 36415 COLL VENOUS BLD VENIPUNCTURE: CPT

## 2021-05-21 PROCEDURE — 85025 COMPLETE CBC W/AUTO DIFF WBC: CPT

## 2021-05-24 LAB — 25(OH)D3 SERPL-MCNC: 17 NG/ML (ref 30–80)

## 2021-05-25 ENCOUNTER — PATIENT OUTREACH (OUTPATIENT)
Dept: MEDICAL GROUP | Facility: MEDICAL CENTER | Age: 56
End: 2021-05-25

## 2021-05-25 NOTE — PROGRESS NOTES
ANNUAL WELLNESS VISIT PRE-VISIT PLANNING    1.  Reviewed notes from the last office visit: Yes    2.  If any orders were ordered or intended to be done prior to visit (i.e. 6 mos follow-up), do we have results/consult notes or has patient scheduled?   · Labs - Labs ordered, completed on 05/21/2021 and results are in chart.   Note: If patient appointment is for lab review and patient did not complete labs, check with provider if OK to reschedule patient until labs completed.  · Imaging - Imaging was not ordered at last office visit.  · Referrals - No referrals were ordered at last office visit.    3.  Immunizations were updated in Epic using Reconcile Outside Information activity? Yes  · Is patient due for Tdap? Yes. An additional charge may apply to this immunization. Please contact [facility] [employer] to estimate the cost of the vaccine with your insurance.  · Is patient due for Shingrix? Yes. An additional charge may apply to this immunization. Please contact [employer] [facility] to estimate the cost of the vaccine with your insurance.       4.  Patient is due for the following Health Maintenance Topics:   Health Maintenance Due   Topic Date Due   • IMM ZOSTER VACCINES (1 of 2) Never done   • IMM DTaP/Tdap/Td Vaccine (2 - Td) 02/18/2021   • MAMMOGRAM  05/28/2021     5.  Reviewed/Updated the following:  · Preferred Pharmacy? Yes  · Preferred Lab? Yes  · Preferred Communication? Yes  · Allergies? Yes  · Medications? YES. Was Abstract Encounter opened and chart updated? NO  · Social History? Yes  · Family History (document living status of immediate family members and if + hx of  cancer, diabetes, hypertension, hyperlipidemia, heart attack, stroke) No    6.  Care Team Updated:       •   DME Company (gait device, O2, CPAP, etc.): N\A       •   Other Specialists (eye doctor, derm, GYN, cardiology, endo, etc): YES    7.  Patient was not advised: “This is a free wellness visit. The provider will screen for  medical conditions to help you stay healthy. If you have other concerns to address you may be asked to discuss these at a separate visit or there may be an additional fee.”     8.  AHA (Puls[employer]) form printed for Provider? N/A           Patient NOT contacted to complete Annual Wellness Visit Pre-Visit Planning. Information was reviewed in chart.   ---------------------------------------------------------------------------------------------  This Pre-Visit Planning note has been created for the Provider and Medical Assistant to review prior to the patient's office appointment.   Patient is NOT REQUIRED to follow-up on this note.

## 2021-06-01 ENCOUNTER — OFFICE VISIT (OUTPATIENT)
Dept: MEDICAL GROUP | Facility: MEDICAL CENTER | Age: 56
End: 2021-06-01
Payer: COMMERCIAL

## 2021-06-01 VITALS
BODY MASS INDEX: 50.5 KG/M2 | HEIGHT: 63 IN | OXYGEN SATURATION: 97 % | TEMPERATURE: 98.2 F | WEIGHT: 285 LBS | HEART RATE: 61 BPM | DIASTOLIC BLOOD PRESSURE: 82 MMHG | SYSTOLIC BLOOD PRESSURE: 126 MMHG

## 2021-06-01 DIAGNOSIS — E78.5 HYPERLIPIDEMIA LDL GOAL <100: ICD-10-CM

## 2021-06-01 DIAGNOSIS — E55.9 VITAMIN D DEFICIENCY: ICD-10-CM

## 2021-06-01 DIAGNOSIS — Z12.31 ENCOUNTER FOR SCREENING MAMMOGRAM FOR MALIGNANT NEOPLASM OF BREAST: ICD-10-CM

## 2021-06-01 DIAGNOSIS — E03.8 OTHER SPECIFIED HYPOTHYROIDISM: ICD-10-CM

## 2021-06-01 DIAGNOSIS — I10 HYPERTENSION, ESSENTIAL: ICD-10-CM

## 2021-06-01 DIAGNOSIS — Z00.00 ANNUAL PHYSICAL EXAM: ICD-10-CM

## 2021-06-01 PROCEDURE — 99396 PREV VISIT EST AGE 40-64: CPT | Performed by: NURSE PRACTITIONER

## 2021-06-01 RX ORDER — ERGOCALCIFEROL 1.25 MG/1
50000 CAPSULE ORAL
Qty: 6 CAPSULE | Refills: 3 | Status: SHIPPED | OUTPATIENT
Start: 2021-06-01 | End: 2022-04-13

## 2021-06-01 RX ORDER — TRIAMTERENE AND HYDROCHLOROTHIAZIDE 37.5; 25 MG/1; MG/1
CAPSULE ORAL
Qty: 90 CAPSULE | Refills: 3 | Status: SHIPPED | OUTPATIENT
Start: 2021-06-01 | End: 2022-05-04

## 2021-06-01 RX ORDER — LEVOTHYROXINE SODIUM 0.12 MG/1
TABLET ORAL
Qty: 102 TABLET | Refills: 3 | Status: SHIPPED | OUTPATIENT
Start: 2021-06-01 | End: 2022-04-26

## 2021-06-01 RX ORDER — ATORVASTATIN CALCIUM 10 MG/1
10 TABLET, FILM COATED ORAL
Qty: 24 TABLET | Refills: 3 | Status: SHIPPED | OUTPATIENT
Start: 2021-06-01 | End: 2022-02-28

## 2021-06-01 ASSESSMENT — PATIENT HEALTH QUESTIONNAIRE - PHQ9: CLINICAL INTERPRETATION OF PHQ2 SCORE: 0

## 2021-06-01 ASSESSMENT — FIBROSIS 4 INDEX: FIB4 SCORE: 0.53

## 2021-06-02 NOTE — PROGRESS NOTES
Subjective:      Toyin Gallagher is a 55 y.o. female who presents with PE          HPI  Seen in f/u for PE.  She is feeling well but sore.  She was at [facility] last weekend.    She is due mammo.  She is stable on synthroid for her hypothyroidism.  Taking med approp.  Needs refill  She is on otc 4000 units d3 daily d/t hx of low d.   She is on maxzide for HTN.  Bp is stable and controlled both here today and at home.  Taking med approp.  Needs refill  She is on lipitor 2x/wk.  Needs refill.  No s/e to med.  Reviewed lab with pt.  Her LP shows trg are up 171 to 242.  She has not been on healthy diet or exercising but she started noom about 1 mo ago.  Already improved diet and becoming more active.  She also bought a fit bit to help also.  HDL is down from 41 to 35 but she chronicially is in 30's.  LDL is down from 143 in 5/19, 108 in9/20 and now down to 97.  Goal is <100.  She is stable on lipitor 2x/wk.  Needs refill.  Vitamin d is low chronically.  This is despite otc supplement.  Will start chronic ergocalciferol  CMP, alb/cr ratio, TSH, T4, CBC, GFR is wnl.    Patient Active Problem List   Diagnosis   • Hyperlipidemia LDL goal <130   • Vitamin D deficiency   • Hypothyroidism   • Hypertension   • Chronic cholecystitis   • Pseudoangiomatous stromal hyperplasia of breast   • Morbid obesity with BMI of 45.0-49.9, adult (HCC)     ROS  Review of Systems   Constitutional: Negative.  Negative for fever, chills, weight loss, malaise/fatigue and diaphoresis.   HENT: Negative.  Negative for hearing loss, ear pain, nosebleeds, congestion, sore throat, neck pain, tinnitus and ear discharge.    Eyes: Negative.  Negative for blurred vision, double vision, photophobia, pain, discharge and redness.   Respiratory: Negative.  Negative for cough, hemoptysis, sputum production, shortness of breath, wheezing and stridor.    Cardiovascular: Negative.  Negative for chest pain, palpitations, orthopnea, claudication,  "PND. occas  leg swelling   Gastrointestinal: Negative.  Negative for heartburn, nausea, vomiting, abdominal pain, diarrhea, constipation, blood in stool and melena.   Genitourinary: Negative.  Negative for dysuria, urgency, frequency, incontinence, hematuria and flank pain.   Musculoskeletal: Negative.  Negative for myalgias, back pain, joint pain and falls.   Skin: Negative.  Negative for itching and rash.   Neurological: Negative.  Negative for dizziness, tingling, tremors, sensory change, speech change, focal weakness, seizures, loss of consciousness, weakness and headaches.   Endo/Heme/Allergies: Negative.  Negative for environmental allergies and polydipsia. Does not bruise/bleed easily.   Psychiatric/Behavioral: Negative.  Negative for depression, suicidal ideas, hallucinations, memory loss and substance abuse. The patient is not nervous/anxious and does not have insomnia.    All other systems reviewed and are negative.       Objective:     /82 (BP Location: Right arm, Patient Position: Sitting)   Pulse 61   Temp 36.8 °C (98.2 °F) (Temporal)   Ht 1.6 m (5' 3\")   Wt (!) 129 kg (285 lb)   SpO2 97%   BMI 50.49 kg/m²      Physical Exam  Physical Exam   Vitals reviewed.  Constitutional: oriented to person, place, and time. appears well-developed and well-nourished. No distress.   HENT: Head: Normocephalic and atraumatic. Bilateral tympanic membranes wnl w/o bulging.  Right Ear: External ear normal. Left Ear: External ear normal. Nose: Nose normal.  Mouth/Throat: Oropharynx is clear and moist. No oropharyngeal exudate. chasidy tm wnl. Eyes: Conjunctivae and EOM are normal. Pupils are equal, round, and reactive to light. Right eye exhibits no discharge. Left eye exhibits no discharge. No scleral icterus.    Neck: Normal range of motion. Neck supple. No JVD present.   Cardiovascular: Normal rate, regular rhythm, normal heart sounds and intact distal pulses.  Exam reveals no gallop and no friction rub.  No " murmur heard.  No carotid bruits   Pulmonary/Chest: Effort normal and breath sounds normal. No stridor. No respiratory distress. no wheezes or rales. exhibits no tenderness.   Abdominal: Soft. Bowel sounds are normal. exhibits no distension and no mass. No tenderness. no rebound and no guarding.   Musculoskeletal: Normal range of motion. exhibits no edema or tenderness.  chasidy pedal pulses 2+.  Lymphadenopathy:  no cervical or supraclavicular adenopathy.   Neurological: alert and oriented to person, place, and time. has normal reflexes. displays normal reflexes. No cranial nerve deficit. exhibits normal muscle tone. Coordination normal.   Skin: Skin is warm and dry. No rash noted. no diaphoresis. No erythema. No pallor.   Psychiatric: normal mood and affect. behavior is normal.       Assessment/Plan:         1. Annual physical exam     2. Hyperlipidemia LDL goal <100  atorvastatin (LIPITOR) 10 MG Tab    LPshows LDL at goal with lipitor.  refilled med.  continue noom for helathy diet and exercise.  plan f/u yearly.  call for lab slip.     3. Other specified hypothyroidism  levothyroxine (SYNTHROID) 125 MCG Tab    refill meds.  stable on meds.    4. Hypertension, essential  triamterene/hctz (MAXZIDE-25/DYAZIDE) 37.5-25 MG Cap    stable on med.  refill meds    5. Vitamin D deficiency  ergocalciferol (DRISDOL) 90234 UNIT capsule    start ergocalciferol every 14 days chronically since d always low.     6. Encounter for screening mammogram for malignant neoplasm of breast  MA-SCREENING MAMMO BILAT W/CAD

## 2021-06-21 ENCOUNTER — HOSPITAL ENCOUNTER (OUTPATIENT)
Dept: RADIOLOGY | Facility: MEDICAL CENTER | Age: 56
End: 2021-06-21
Attending: NURSE PRACTITIONER
Payer: COMMERCIAL

## 2021-06-21 DIAGNOSIS — Z12.31 VISIT FOR SCREENING MAMMOGRAM: ICD-10-CM

## 2021-06-21 PROCEDURE — 77063 BREAST TOMOSYNTHESIS BI: CPT

## 2022-02-27 DIAGNOSIS — E78.5 HYPERLIPIDEMIA LDL GOAL <100: ICD-10-CM

## 2022-02-28 RX ORDER — ATORVASTATIN CALCIUM 10 MG/1
TABLET, FILM COATED ORAL
Qty: 24 TABLET | Refills: 0 | Status: SHIPPED | OUTPATIENT
Start: 2022-02-28 | End: 2022-05-09

## 2022-04-10 DIAGNOSIS — I10 HYPERTENSION, ESSENTIAL: ICD-10-CM

## 2022-04-10 DIAGNOSIS — E78.5 HYPERLIPIDEMIA LDL GOAL <100: ICD-10-CM

## 2022-04-10 DIAGNOSIS — D50.8 OTHER IRON DEFICIENCY ANEMIA: ICD-10-CM

## 2022-04-10 DIAGNOSIS — E55.9 VITAMIN D DEFICIENCY: ICD-10-CM

## 2022-04-10 DIAGNOSIS — E03.9 ACQUIRED HYPOTHYROIDISM: ICD-10-CM

## 2022-04-10 DIAGNOSIS — E03.8 OTHER SPECIFIED HYPOTHYROIDISM: ICD-10-CM

## 2022-04-13 RX ORDER — ERGOCALCIFEROL 1.25 MG/1
CAPSULE ORAL
Qty: 6 CAPSULE | Refills: 0 | Status: SHIPPED | OUTPATIENT
Start: 2022-04-13 | End: 2022-05-11 | Stop reason: SDUPTHER

## 2022-04-22 LAB
25(OH)D3+25(OH)D2 SERPL-MCNC: 23.9 NG/ML (ref 30–100)
ALBUMIN SERPL-MCNC: 4 G/DL (ref 3.8–4.9)
ALBUMIN/CREAT UR: <4 MG/G CREAT (ref 0–29)
ALBUMIN/GLOB SERPL: 1.4 {RATIO} (ref 1.2–2.2)
ALP SERPL-CCNC: 89 IU/L (ref 44–121)
ALT SERPL-CCNC: 13 IU/L (ref 0–32)
AST SERPL-CCNC: 16 IU/L (ref 0–40)
BASOPHILS # BLD AUTO: 0.1 X10E3/UL (ref 0–0.2)
BASOPHILS NFR BLD AUTO: 1 %
BILIRUB SERPL-MCNC: 0.6 MG/DL (ref 0–1.2)
BUN SERPL-MCNC: 11 MG/DL (ref 6–24)
BUN/CREAT SERPL: 13 (ref 9–23)
CALCIUM SERPL-MCNC: 8.9 MG/DL (ref 8.7–10.2)
CHLORIDE SERPL-SCNC: 103 MMOL/L (ref 96–106)
CHOLEST SERPL-MCNC: 209 MG/DL (ref 100–199)
CO2 SERPL-SCNC: 24 MMOL/L (ref 20–29)
CREAT SERPL-MCNC: 0.84 MG/DL (ref 0.57–1)
CREAT UR-MCNC: 72 MG/DL
EGFRCR SERPLBLD CKD-EPI 2021: 83 ML/MIN/1.73
EOSINOPHIL # BLD AUTO: 0.2 X10E3/UL (ref 0–0.4)
EOSINOPHIL NFR BLD AUTO: 4 %
ERYTHROCYTE [DISTWIDTH] IN BLOOD BY AUTOMATED COUNT: 14.3 % (ref 11.7–15.4)
GLOBULIN SER CALC-MCNC: 2.9 G/DL (ref 1.5–4.5)
GLUCOSE SERPL-MCNC: 99 MG/DL (ref 65–99)
HCT VFR BLD AUTO: 45.8 % (ref 34–46.6)
HDLC SERPL-MCNC: 36 MG/DL
HGB BLD-MCNC: 15.4 G/DL (ref 11.1–15.9)
IMM GRANULOCYTES # BLD AUTO: 0 X10E3/UL (ref 0–0.1)
IMM GRANULOCYTES NFR BLD AUTO: 0 %
IMMATURE CELLS  115398: ABNORMAL
LABORATORY COMMENT REPORT: ABNORMAL
LDLC SERPL CALC-MCNC: 125 MG/DL (ref 0–99)
LYMPHOCYTES # BLD AUTO: 1.9 X10E3/UL (ref 0.7–3.1)
LYMPHOCYTES NFR BLD AUTO: 28 %
MCH RBC QN AUTO: 28.4 PG (ref 26.6–33)
MCHC RBC AUTO-ENTMCNC: 33.6 G/DL (ref 31.5–35.7)
MCV RBC AUTO: 85 FL (ref 79–97)
MICROALBUMIN UR-MCNC: <3 UG/ML
MONOCYTES # BLD AUTO: 0.6 X10E3/UL (ref 0.1–0.9)
MONOCYTES NFR BLD AUTO: 9 %
MORPHOLOGY BLD-IMP: ABNORMAL
NEUTROPHILS # BLD AUTO: 4 X10E3/UL (ref 1.4–7)
NEUTROPHILS NFR BLD AUTO: 58 %
NRBC BLD AUTO-RTO: ABNORMAL %
PLATELET # BLD AUTO: 457 X10E3/UL (ref 150–450)
POTASSIUM SERPL-SCNC: 4.1 MMOL/L (ref 3.5–5.2)
PROT SERPL-MCNC: 6.9 G/DL (ref 6–8.5)
RBC # BLD AUTO: 5.42 X10E6/UL (ref 3.77–5.28)
SODIUM SERPL-SCNC: 141 MMOL/L (ref 134–144)
T4 FREE SERPL-MCNC: 1.49 NG/DL (ref 0.82–1.77)
TRIGL SERPL-MCNC: 271 MG/DL (ref 0–149)
TSH SERPL DL<=0.005 MIU/L-ACNC: 0.96 UIU/ML (ref 0.45–4.5)
VLDLC SERPL CALC-MCNC: 48 MG/DL (ref 5–40)
WBC # BLD AUTO: 6.7 X10E3/UL (ref 3.4–10.8)

## 2022-05-08 DIAGNOSIS — E78.5 HYPERLIPIDEMIA LDL GOAL <100: ICD-10-CM

## 2022-05-09 RX ORDER — ATORVASTATIN CALCIUM 10 MG/1
TABLET, FILM COATED ORAL
Qty: 24 TABLET | Refills: 4 | Status: SHIPPED | OUTPATIENT
Start: 2022-05-09 | End: 2022-11-12 | Stop reason: SDUPTHER

## 2022-05-10 SDOH — HEALTH STABILITY: PHYSICAL HEALTH: ON AVERAGE, HOW MANY DAYS PER WEEK DO YOU ENGAGE IN MODERATE TO STRENUOUS EXERCISE (LIKE A BRISK WALK)?: 2 DAYS

## 2022-05-10 SDOH — ECONOMIC STABILITY: FOOD INSECURITY: WITHIN THE PAST 12 MONTHS, THE FOOD YOU BOUGHT JUST DIDN'T LAST AND YOU DIDN'T HAVE MONEY TO GET MORE.: NEVER TRUE

## 2022-05-10 SDOH — ECONOMIC STABILITY: TRANSPORTATION INSECURITY
IN THE PAST 12 MONTHS, HAS LACK OF TRANSPORTATION KEPT YOU FROM MEETINGS, WORK, OR FROM GETTING THINGS NEEDED FOR DAILY LIVING?: NO

## 2022-05-10 SDOH — ECONOMIC STABILITY: HOUSING INSECURITY: IN THE LAST 12 MONTHS, HOW MANY PLACES HAVE YOU LIVED?: 1

## 2022-05-10 SDOH — ECONOMIC STABILITY: TRANSPORTATION INSECURITY
IN THE PAST 12 MONTHS, HAS THE LACK OF TRANSPORTATION KEPT YOU FROM MEDICAL APPOINTMENTS OR FROM GETTING MEDICATIONS?: NO

## 2022-05-10 SDOH — ECONOMIC STABILITY: INCOME INSECURITY: IN THE LAST 12 MONTHS, WAS THERE A TIME WHEN YOU WERE NOT ABLE TO PAY THE MORTGAGE OR RENT ON TIME?: NO

## 2022-05-10 SDOH — HEALTH STABILITY: MENTAL HEALTH
STRESS IS WHEN SOMEONE FEELS TENSE, NERVOUS, ANXIOUS, OR CAN'T SLEEP AT NIGHT BECAUSE THEIR MIND IS TROUBLED. HOW STRESSED ARE YOU?: NOT AT ALL

## 2022-05-10 SDOH — ECONOMIC STABILITY: HOUSING INSECURITY
IN THE LAST 12 MONTHS, WAS THERE A TIME WHEN YOU DID NOT HAVE A STEADY PLACE TO SLEEP OR SLEPT IN A SHELTER (INCLUDING NOW)?: NO

## 2022-05-10 SDOH — ECONOMIC STABILITY: INCOME INSECURITY: HOW HARD IS IT FOR YOU TO PAY FOR THE VERY BASICS LIKE FOOD, HOUSING, MEDICAL CARE, AND HEATING?: NOT VERY HARD

## 2022-05-10 SDOH — ECONOMIC STABILITY: FOOD INSECURITY: WITHIN THE PAST 12 MONTHS, YOU WORRIED THAT YOUR FOOD WOULD RUN OUT BEFORE YOU GOT MONEY TO BUY MORE.: NEVER TRUE

## 2022-05-10 SDOH — HEALTH STABILITY: PHYSICAL HEALTH: ON AVERAGE, HOW MANY MINUTES DO YOU ENGAGE IN EXERCISE AT THIS LEVEL?: 20 MIN

## 2022-05-10 SDOH — ECONOMIC STABILITY: TRANSPORTATION INSECURITY
IN THE PAST 12 MONTHS, HAS LACK OF RELIABLE TRANSPORTATION KEPT YOU FROM MEDICAL APPOINTMENTS, MEETINGS, WORK OR FROM GETTING THINGS NEEDED FOR DAILY LIVING?: NO

## 2022-05-10 ASSESSMENT — SOCIAL DETERMINANTS OF HEALTH (SDOH)
HOW OFTEN DO YOU ATTEND CHURCH OR RELIGIOUS SERVICES?: MORE THAN 4 TIMES PER YEAR
HOW HARD IS IT FOR YOU TO PAY FOR THE VERY BASICS LIKE FOOD, HOUSING, MEDICAL CARE, AND HEATING?: NOT VERY HARD
DO YOU BELONG TO ANY CLUBS OR ORGANIZATIONS SUCH AS CHURCH GROUPS UNIONS, FRATERNAL OR ATHLETIC GROUPS, OR SCHOOL GROUPS?: YES
IN A TYPICAL WEEK, HOW MANY TIMES DO YOU TALK ON THE PHONE WITH FAMILY, FRIENDS, OR NEIGHBORS?: TWICE A WEEK
HOW OFTEN DO YOU HAVE SIX OR MORE DRINKS ON ONE OCCASION: NEVER
HOW MANY DRINKS CONTAINING ALCOHOL DO YOU HAVE ON A TYPICAL DAY WHEN YOU ARE DRINKING: PATIENT DECLINED
IN A TYPICAL WEEK, HOW MANY TIMES DO YOU TALK ON THE PHONE WITH FAMILY, FRIENDS, OR NEIGHBORS?: TWICE A WEEK
DO YOU BELONG TO ANY CLUBS OR ORGANIZATIONS SUCH AS CHURCH GROUPS UNIONS, FRATERNAL OR ATHLETIC GROUPS, OR SCHOOL GROUPS?: YES
HOW OFTEN DO YOU GET TOGETHER WITH FRIENDS OR RELATIVES?: ONCE A WEEK
WITHIN THE PAST 12 MONTHS, YOU WORRIED THAT YOUR FOOD WOULD RUN OUT BEFORE YOU GOT THE MONEY TO BUY MORE: NEVER TRUE
HOW OFTEN DO YOU ATTENT MEETINGS OF THE CLUB OR ORGANIZATION YOU BELONG TO?: MORE THAN 4 TIMES PER YEAR
HOW OFTEN DO YOU ATTEND CHURCH OR RELIGIOUS SERVICES?: MORE THAN 4 TIMES PER YEAR
HOW OFTEN DO YOU GET TOGETHER WITH FRIENDS OR RELATIVES?: ONCE A WEEK
HOW OFTEN DO YOU HAVE A DRINK CONTAINING ALCOHOL: NEVER
HOW OFTEN DO YOU ATTENT MEETINGS OF THE CLUB OR ORGANIZATION YOU BELONG TO?: MORE THAN 4 TIMES PER YEAR

## 2022-05-10 ASSESSMENT — LIFESTYLE VARIABLES
HOW MANY STANDARD DRINKS CONTAINING ALCOHOL DO YOU HAVE ON A TYPICAL DAY: PATIENT DECLINED
HOW OFTEN DO YOU HAVE SIX OR MORE DRINKS ON ONE OCCASION: NEVER
HOW OFTEN DO YOU HAVE A DRINK CONTAINING ALCOHOL: NEVER

## 2022-05-11 ENCOUNTER — OFFICE VISIT (OUTPATIENT)
Dept: MEDICAL GROUP | Facility: MEDICAL CENTER | Age: 57
End: 2022-05-11
Payer: COMMERCIAL

## 2022-05-11 VITALS
HEIGHT: 63 IN | DIASTOLIC BLOOD PRESSURE: 80 MMHG | WEIGHT: 278.2 LBS | SYSTOLIC BLOOD PRESSURE: 122 MMHG | TEMPERATURE: 98.2 F | OXYGEN SATURATION: 96 % | BODY MASS INDEX: 49.29 KG/M2 | HEART RATE: 90 BPM

## 2022-05-11 DIAGNOSIS — E78.5 HYPERLIPIDEMIA LDL GOAL <100: ICD-10-CM

## 2022-05-11 DIAGNOSIS — E03.8 OTHER SPECIFIED HYPOTHYROIDISM: ICD-10-CM

## 2022-05-11 DIAGNOSIS — M79.671 RIGHT FOOT PAIN: ICD-10-CM

## 2022-05-11 DIAGNOSIS — Z12.31 ENCOUNTER FOR SCREENING MAMMOGRAM FOR MALIGNANT NEOPLASM OF BREAST: ICD-10-CM

## 2022-05-11 DIAGNOSIS — D75.839 THROMBOCYTOSIS, UNSPECIFIED: ICD-10-CM

## 2022-05-11 DIAGNOSIS — Z00.00 ANNUAL PHYSICAL EXAM: ICD-10-CM

## 2022-05-11 DIAGNOSIS — I10 HYPERTENSION, ESSENTIAL: ICD-10-CM

## 2022-05-11 DIAGNOSIS — M21.6X1 PRONATION OF RIGHT FOOT: ICD-10-CM

## 2022-05-11 DIAGNOSIS — E55.9 VITAMIN D DEFICIENCY: ICD-10-CM

## 2022-05-11 PROCEDURE — 99396 PREV VISIT EST AGE 40-64: CPT | Performed by: NURSE PRACTITIONER

## 2022-05-11 RX ORDER — ERGOCALCIFEROL 1.25 MG/1
50000 CAPSULE ORAL
Qty: 12 CAPSULE | Refills: 3 | Status: SHIPPED | OUTPATIENT
Start: 2022-05-11 | End: 2022-11-12 | Stop reason: SDUPTHER

## 2022-05-11 ASSESSMENT — FIBROSIS 4 INDEX: FIB4 SCORE: 0.52

## 2022-05-11 NOTE — PROGRESS NOTES
Subjective:     Toyin Gallagher is a 56 y.o. female who presents with PE.    HPI:   Seen in f/u for PE.  She is feeling well.   She just got .  He was her high school friend.    She is having more rt lateral foot pain.  Walks with her feet turned inward. Has orthotics but thats is not helping.  She is walking more and has noted the pain more.    She is due mammo.  Reviewed lab with pt.  Vitamin d is low at 23.9.  She has been taking her ergocalciferol every 14 days consistently.  CBC is wnl except plts very mildly elevated.  Has had this in the past since childhood.  No sx.  O/w CBC is wnl.  CMP, GFR, alb/cr ratio, TSH, T4 is wnl.    LP shows trg are up from 242 to 271.  Has not been on a healthy diet with her wedding.  Is more active with walking.  HDL is chronically low in 30's.  LDL is up from 97 last year to 125.  She is taking her lipitor approp 2x/wk.  Prefers to improve lifestyle before increasing or adding meds.   BP stable and well controlled on maxzide.  Taking med approp  She is stable and well controlled with her thryoid on synthroid.  Taking med approp    Patient Active Problem List    Diagnosis Date Noted   • Morbid obesity with BMI of 45.0-49.9, adult ([facility]) 09/30/2020   • Pseudoangiomatous stromal hyperplasia of breast 03/28/2018   • Chronic cholecystitis 01/13/2017   • Hyperlipidemia LDL goal <130    • Vitamin D deficiency    • Hypothyroidism    • Hypertension        Current medicines (including changes today)  Current Outpatient Medications   Medication Sig Dispense Refill   • vitamin D2, Ergocalciferol, (DRISDOL) 1.25 MG (32865 UT) Cap capsule Take 1 Capsule by mouth every 7 days. 12 Capsule 3   • atorvastatin (LIPITOR) 10 MG Tab TAKE 1 TABLET EVERY 72 HOURS 24 Tablet 4   • triamterene/hctz (MAXZIDE-25/DYAZIDE) 37.5-25 MG Cap TAKE 1 CAPSULE EVERY MORNING 90 Capsule 0   • levothyroxine (SYNTHROID) 125 MCG Tab TAKE 1 TABLET EVERY DAY FOR 5 DAYS PER WEEK AND TAKE ONE AND ONE-HALF TABLETS  "2 DAYS PER WEEK 102 Tablet 0   • ASPIRIN 81 PO        No current facility-administered medications for this visit.       No Known Allergies    ROS  Review of Systems   Constitutional: Negative.  Negative for fever, chills, weight loss, malaise/fatigue and diaphoresis.   HENT: Negative.  Negative for hearing loss, ear pain, nosebleeds, congestion, sore throat, neck pain, tinnitus and ear discharge.    Eyes: Negative.  Negative for blurred vision, double vision, photophobia, pain, discharge and redness.   Respiratory: Negative.  Negative for cough, hemoptysis, sputum production, shortness of breath, wheezing and stridor.    Cardiovascular: Negative.  Negative for chest pain, palpitations, orthopnea, claudication, leg swelling and PND.   Gastrointestinal: Negative.  Negative for heartburn, nausea, vomiting, abdominal pain, diarrhea, constipation, blood in stool and melena.   Genitourinary: Negative.  Negative for dysuria, urgency, frequency, incontinence, hematuria and flank pain.   Musculoskeletal: Negative.  Negative for myalgias, back pain, joint pain and falls.   Skin: Negative.  Negative for itching and rash.   Neurological: Negative.  Negative for dizziness, tingling, tremors, sensory change, speech change, focal weakness, seizures, loss of consciousness, weakness and headaches.   Endo/Heme/Allergies: Negative.  Negative for environmental allergies and polydipsia. Does not bruise/bleed easily.   Psychiatric/Behavioral: Negative.  Negative for depression, suicidal ideas, hallucinations, memory loss and substance abuse. The patient is not nervous/anxious and does not have insomnia.    All other systems reviewed and are negative.       Objective:     /80 (BP Location: Right arm, Patient Position: Sitting)   Pulse 90   Temp 36.8 °C (98.2 °F) (Temporal)   Ht 1.6 m (5' 3\")   Wt (!) 126 kg (278 lb 3.2 oz)   SpO2 96%  Body mass index is 49.28 kg/m².    Physical Exam:  Physical Exam   Vitals " reviewed.  Constitutional: oriented to person, place, and time. appears well-developed and well-nourished. No distress.   HENT: Head: Normocephalic and atraumatic. Bilateral tympanic membranes wnl w/o bulging.  Right Ear: External ear normal. Left Ear: External ear normal. Nose: Nose normal.  Mouth/Throat: Oropharynx is clear and moist. No oropharyngeal exudate. chasidy tm wnl. Eyes: Conjunctivae and EOM are normal. Pupils are equal, round, and reactive to light. Right eye exhibits no discharge. Left eye exhibits no discharge. No scleral icterus.    Neck: Normal range of motion. Neck supple. No JVD present.   Cardiovascular: Normal rate, regular rhythm, normal heart sounds and intact distal pulses.  Exam reveals no gallop and no friction rub.  No murmur heard.  No carotid bruits   Pulmonary/Chest: Effort normal and breath sounds normal. No stridor. No respiratory distress. no wheezes or rales. exhibits no tenderness.   Abdominal: Soft. Bowel sounds are normal. exhibits no distension and no mass. No tenderness. no rebound and no guarding.   Musculoskeletal: Normal range of motion. exhibits no edema or tenderness.  chasidy pedal pulses 2+.  Lymphadenopathy:  no cervical or supraclavicular adenopathy.   Neurological: alert and oriented to person, place, and time. has normal reflexes. displays normal reflexes. No cranial nerve deficit. exhibits normal muscle tone. Coordination normal.   Skin: Skin is warm and dry. No rash noted. no diaphoresis. No erythema. No pallor.   Psychiatric: normal mood and affect. behavior is normal.   No breast tenderness, mass, nipple discharge, changes in size or contour, or abnormal cyclic discomfort.   Breast Exam:Performed with instruction during examination. Breasts equal size and shape.  No axillary lymphadenopathy, no skin changes, no dominant masses. No nipple retraction     Assessment and Plan:     The following treatment plan was discussed:    1. Annual physical exam     2. Vitamin D  deficiency  vitamin D2, Ergocalciferol, (DRISDOL) 1.25 MG (87664 UT) Cap capsule    VITAMIN D,25 HYDROXY    vitamin d low despite every 14 day ergocalciferol.  inc to every 7 days.  judy lab 4 mo.  fu for review.     3. Right foot pain  Referral to Orthopedics    refer ortho for eval.     4. Pronation of right foot  Referral to Orthopedics    has orthotics and walking more.  refer ortho for pain   5. Hyperlipidemia LDL goal <100  Lipid Profile    LP not at goal.  enc pt to iiprove healthy diet & do regular exercise.  judy lab 4 mo.  f/u for review.  consider tricor and inc lipitor if not at goal   6. Hypertension, essential      stable and well controlled on med.   7. Other specified hypothyroidism      stable and well controlled on med.  plan:  repeat thyroid lab 1 yr   8. Thrombocytosis, unspecified      very mildly elevated plts.  no sx.  has had this issue in past.  will monitor   9. Encounter for screening mammogram for malignant neoplasm of breast  MA-SCREENING MAMMO BILAT W/CAD         Followup: Return in about 4 months (around 9/11/2022).

## 2022-06-30 ENCOUNTER — HOSPITAL ENCOUNTER (OUTPATIENT)
Dept: RADIOLOGY | Facility: MEDICAL CENTER | Age: 57
End: 2022-06-30
Attending: NURSE PRACTITIONER
Payer: COMMERCIAL

## 2022-06-30 DIAGNOSIS — Z12.31 ENCOUNTER FOR SCREENING MAMMOGRAM FOR MALIGNANT NEOPLASM OF BREAST: ICD-10-CM

## 2022-06-30 PROCEDURE — 77063 BREAST TOMOSYNTHESIS BI: CPT

## 2022-11-12 DIAGNOSIS — I10 HYPERTENSION, ESSENTIAL: ICD-10-CM

## 2022-11-12 DIAGNOSIS — E03.8 OTHER SPECIFIED HYPOTHYROIDISM: ICD-10-CM

## 2022-11-12 DIAGNOSIS — E55.9 VITAMIN D DEFICIENCY: ICD-10-CM

## 2022-11-12 DIAGNOSIS — E78.5 HYPERLIPIDEMIA LDL GOAL <100: ICD-10-CM

## 2022-11-12 RX ORDER — LEVOTHYROXINE SODIUM 0.12 MG/1
TABLET ORAL
Qty: 102 TABLET | Refills: 0 | Status: SHIPPED | OUTPATIENT
Start: 2022-11-12 | End: 2023-02-23 | Stop reason: SDUPTHER

## 2022-11-12 RX ORDER — ERGOCALCIFEROL 1.25 MG/1
50000 CAPSULE ORAL
Qty: 12 CAPSULE | Refills: 3 | Status: SHIPPED | OUTPATIENT
Start: 2022-11-12 | End: 2023-10-11 | Stop reason: SDUPTHER

## 2022-11-12 RX ORDER — ATORVASTATIN CALCIUM 10 MG/1
10 TABLET, FILM COATED ORAL
Qty: 48 TABLET | Refills: 0 | Status: SHIPPED | OUTPATIENT
Start: 2022-11-12 | End: 2022-11-21 | Stop reason: SDUPTHER

## 2022-11-12 RX ORDER — TRIAMTERENE AND HYDROCHLOROTHIAZIDE 37.5; 25 MG/1; MG/1
1 CAPSULE ORAL EVERY MORNING
Qty: 90 CAPSULE | Refills: 0 | Status: SHIPPED | OUTPATIENT
Start: 2022-11-12 | End: 2023-03-06 | Stop reason: SDUPTHER

## 2022-11-14 ENCOUNTER — HOSPITAL ENCOUNTER (OUTPATIENT)
Dept: LAB | Facility: MEDICAL CENTER | Age: 57
End: 2022-11-14
Attending: NURSE PRACTITIONER
Payer: COMMERCIAL

## 2022-11-14 DIAGNOSIS — E78.5 HYPERLIPIDEMIA LDL GOAL <100: ICD-10-CM

## 2022-11-14 DIAGNOSIS — E55.9 VITAMIN D DEFICIENCY: ICD-10-CM

## 2022-11-14 LAB
25(OH)D3 SERPL-MCNC: 25 NG/ML (ref 30–100)
CHOLEST SERPL-MCNC: 218 MG/DL (ref 100–199)
FASTING STATUS PATIENT QL REPORTED: NORMAL
HDLC SERPL-MCNC: 35 MG/DL
LDLC SERPL CALC-MCNC: 137 MG/DL
TRIGL SERPL-MCNC: 232 MG/DL (ref 0–149)

## 2022-11-14 PROCEDURE — 80061 LIPID PANEL: CPT

## 2022-11-14 PROCEDURE — 82306 VITAMIN D 25 HYDROXY: CPT

## 2022-11-14 PROCEDURE — 36415 COLL VENOUS BLD VENIPUNCTURE: CPT

## 2022-11-21 ENCOUNTER — OFFICE VISIT (OUTPATIENT)
Dept: MEDICAL GROUP | Facility: MEDICAL CENTER | Age: 57
End: 2022-11-21
Payer: COMMERCIAL

## 2022-11-21 VITALS
BODY MASS INDEX: 50.53 KG/M2 | SYSTOLIC BLOOD PRESSURE: 120 MMHG | HEART RATE: 71 BPM | OXYGEN SATURATION: 94 % | WEIGHT: 285.2 LBS | HEIGHT: 63 IN | DIASTOLIC BLOOD PRESSURE: 82 MMHG | TEMPERATURE: 97 F | RESPIRATION RATE: 16 BRPM

## 2022-11-21 DIAGNOSIS — E78.5 HYPERLIPIDEMIA LDL GOAL <100: ICD-10-CM

## 2022-11-21 DIAGNOSIS — E55.9 VITAMIN D DEFICIENCY: ICD-10-CM

## 2022-11-21 PROCEDURE — 99214 OFFICE O/P EST MOD 30 MIN: CPT | Performed by: NURSE PRACTITIONER

## 2022-11-21 RX ORDER — ATORVASTATIN CALCIUM 10 MG/1
TABLET, FILM COATED ORAL
Qty: 48 TABLET | Refills: 1 | Status: SHIPPED | OUTPATIENT
Start: 2022-11-21 | End: 2023-03-06 | Stop reason: SDUPTHER

## 2022-11-21 ASSESSMENT — FIBROSIS 4 INDEX: FIB4 SCORE: 0.53

## 2022-11-21 NOTE — PROGRESS NOTES
Subjective:     Toyin Gallagher is a 57 y.o. female who presents with hyperlipidemia.    HPI:   Seen in f/u for hyperlipidemia.  She is feeling well.  She had her flu, shingles and booster for COVID last week.  She has transferred to crime lab so is around potentially dangerous germs. Will get her final hep b and Tdap in feb.    She has been eating meat recently.  She is not exercising much now that its cold.  Was walking during summer.   She is not limiting the carbs in her diet.   Reviewed lab with pt.  Her vitamin d is 25.  That is up from 23. She was out of her ergocalciferol for awhile but will be restarting when she received from mail order.  She is on pres med all the time.  LP shows trg still high but downfrom previous at 232.  HDL chronic low at 35.  LDL up from  123 in aprril.  Now at 137.  Goal is <100.  Taking meds approp.  She is on lipitor 2x/wk.       Patient Active Problem List    Diagnosis Date Noted    Morbid obesity with BMI of 45.0-49.9, adult (HCC) 09/30/2020    Pseudoangiomatous stromal hyperplasia of breast 03/28/2018    Chronic cholecystitis 01/13/2017    Hyperlipidemia LDL goal <130     Vitamin D deficiency     Hypothyroidism     Hypertension        Current medicines (including changes today)  Current Outpatient Medications   Medication Sig Dispense Refill    atorvastatin (LIPITOR) 10 MG Tab Take 1 tab daily 4x/wk 48 Tablet 1    vitamin D2, Ergocalciferol, (DRISDOL) 1.25 MG (91647 UT) Cap capsule Take 1 Capsule by mouth every 7 days. 12 Capsule 3    levothyroxine (SYNTHROID) 125 MCG Tab Take 1 tab daily 5x/wk and 1.5 tabs daily 2x/wk 102 Tablet 0    triamterene/hctz (MAXZIDE-25/DYAZIDE) 37.5-25 MG Cap Take 1 Capsule by mouth every morning. 90 Capsule 0    ASPIRIN 81 PO        No current facility-administered medications for this visit.       No Known Allergies    ROS  Constitutional: Negative. Negative for fever, chills, weight loss, malaise/fatigue and diaphoresis.   HENT: Negative.  "Negative for hearing loss, ear pain, nosebleeds, congestion, sore throat, neck pain, tinnitus and ear discharge.   Respiratory: Negative. Negative for cough, hemoptysis, sputum production, shortness of breath, wheezing and stridor.   Cardiovascular: Negative. Negative for chest pain, palpitations, orthopnea, claudication, leg swelling and PND.   Gastrointestinal: Denies nausea, vomiting, diarrhea, constipation, heartburn, melena or hematochezia.  Genitourinary: Denies dysuria, hematuria, urinary incontinence, frequency or urgency.        Objective:     /82   Pulse 71   Temp 36.1 °C (97 °F) (Temporal)   Resp 16   Ht 1.6 m (5' 3\")   Wt (!) 129 kg (285 lb 3.2 oz)   SpO2 94%  Body mass index is 50.52 kg/m².    Physical Exam:  Vitals reviewed.  Constitutional: Oriented to person, place, and time. appears well-developed and well-nourished. No distress.   Cardiovascular: Normal rate, regular rhythm, normal heart sounds and intact distal pulses. Exam reveals no gallop and no friction rub. No murmur heard. No carotid bruits.   Pulmonary/Chest: Effort normal and breath sounds normal. No stridor. No respiratory distress. no wheezes or rales. exhibits no tenderness.   Musculoskeletal: Normal range of motion. exhibits no edema. chasidy pedal pulses 2+.  Lymphadenopathy: No cervical or supraclavicular adenopathy.   Neurological: Alert and oriented to person, place, and time. exhibits normal muscle tone.  Skin: Skin is warm and dry. No diaphoresis.   Psychiatric: Normal mood and affect. Behavior is normal.      Assessment and Plan:     The following treatment plan was discussed:    1. Hyperlipidemia LDL goal <100  atorvastatin (LIPITOR) 10 MG Tab    Comp Metabolic Panel    Lipid Profile    LP all not at goal.  enc pt to improve healthy diet and do regualr exercise.  inc lipitor to 4x/wk. judy lab 4 mo.  f/u for review. will do hep b 3rd shot, Tdap      2. Vitamin D deficiency      vitamin d is low. will restart " ergocalciferol when received from pharmacy.  do lab and f/u for review april            Followup: Return in about 4 months (around 3/21/2023), or for lab review.

## 2023-02-23 DIAGNOSIS — E03.8 OTHER SPECIFIED HYPOTHYROIDISM: ICD-10-CM

## 2023-02-23 RX ORDER — LEVOTHYROXINE SODIUM 0.12 MG/1
TABLET ORAL
Qty: 102 TABLET | Refills: 0 | Status: SHIPPED | OUTPATIENT
Start: 2023-02-23 | End: 2023-05-24 | Stop reason: SDUPTHER

## 2023-02-23 NOTE — TELEPHONE ENCOUNTER
Received request via: Pharmacy    Was the patient seen in the last year in this department? Yes, LOV: 11/21/2022    Does the patient have an active prescription (recently filled or refills available) for medication(s) requested? No    Does the patient have intermediate Plus and need 100 day supply (blood pressure, diabetes and cholesterol meds only)? Patient does not have SCP

## 2023-03-06 DIAGNOSIS — I10 HYPERTENSION, ESSENTIAL: ICD-10-CM

## 2023-03-06 DIAGNOSIS — E78.5 HYPERLIPIDEMIA LDL GOAL <100: ICD-10-CM

## 2023-03-06 RX ORDER — TRIAMTERENE AND HYDROCHLOROTHIAZIDE 37.5; 25 MG/1; MG/1
1 CAPSULE ORAL EVERY MORNING
Qty: 90 CAPSULE | Refills: 0 | Status: SHIPPED | OUTPATIENT
Start: 2023-03-06 | End: 2023-08-18 | Stop reason: SDUPTHER

## 2023-03-06 RX ORDER — ATORVASTATIN CALCIUM 10 MG/1
TABLET, FILM COATED ORAL
Qty: 48 TABLET | Refills: 1 | Status: SHIPPED | OUTPATIENT
Start: 2023-03-06 | End: 2023-04-07

## 2023-03-14 ENCOUNTER — APPOINTMENT (OUTPATIENT)
Dept: MEDICAL GROUP | Facility: MEDICAL CENTER | Age: 58
End: 2023-03-14
Payer: COMMERCIAL

## 2023-03-25 LAB
ALBUMIN SERPL-MCNC: 4 G/DL (ref 3.8–4.9)
ALBUMIN/GLOB SERPL: 1.3 {RATIO} (ref 1.2–2.2)
ALP SERPL-CCNC: 87 IU/L (ref 44–121)
ALT SERPL-CCNC: 16 IU/L (ref 0–32)
AST SERPL-CCNC: 16 IU/L (ref 0–40)
BILIRUB SERPL-MCNC: 0.4 MG/DL (ref 0–1.2)
BUN SERPL-MCNC: 11 MG/DL (ref 6–24)
BUN/CREAT SERPL: 13 (ref 9–23)
CALCIUM SERPL-MCNC: 9.2 MG/DL (ref 8.7–10.2)
CHLORIDE SERPL-SCNC: 102 MMOL/L (ref 96–106)
CHOLEST SERPL-MCNC: 204 MG/DL (ref 100–199)
CO2 SERPL-SCNC: 25 MMOL/L (ref 20–29)
CREAT SERPL-MCNC: 0.85 MG/DL (ref 0.57–1)
EGFRCR SERPLBLD CKD-EPI 2021: 81 ML/MIN/1.73
GLOBULIN SER CALC-MCNC: 3.1 G/DL (ref 1.5–4.5)
GLUCOSE SERPL-MCNC: 128 MG/DL (ref 70–99)
HDLC SERPL-MCNC: 35 MG/DL
LABORATORY COMMENT REPORT: ABNORMAL
LDLC SERPL CALC-MCNC: 115 MG/DL (ref 0–99)
POTASSIUM SERPL-SCNC: 4.5 MMOL/L (ref 3.5–5.2)
PROT SERPL-MCNC: 7.1 G/DL (ref 6–8.5)
SODIUM SERPL-SCNC: 141 MMOL/L (ref 134–144)
TRIGL SERPL-MCNC: 309 MG/DL (ref 0–149)
VLDLC SERPL CALC-MCNC: 54 MG/DL (ref 5–40)

## 2023-04-07 ENCOUNTER — OFFICE VISIT (OUTPATIENT)
Dept: MEDICAL GROUP | Facility: MEDICAL CENTER | Age: 58
End: 2023-04-07
Payer: COMMERCIAL

## 2023-04-07 VITALS
RESPIRATION RATE: 16 BRPM | HEIGHT: 63 IN | HEART RATE: 92 BPM | TEMPERATURE: 97.8 F | WEIGHT: 293 LBS | SYSTOLIC BLOOD PRESSURE: 140 MMHG | DIASTOLIC BLOOD PRESSURE: 84 MMHG | OXYGEN SATURATION: 93 % | BODY MASS INDEX: 51.91 KG/M2

## 2023-04-07 DIAGNOSIS — G50.0 TRIGEMINAL NEURALGIA: ICD-10-CM

## 2023-04-07 DIAGNOSIS — R73.01 IFG (IMPAIRED FASTING GLUCOSE): ICD-10-CM

## 2023-04-07 DIAGNOSIS — E78.2 MIXED HYPERLIPIDEMIA: ICD-10-CM

## 2023-04-07 DIAGNOSIS — E03.8 OTHER SPECIFIED HYPOTHYROIDISM: ICD-10-CM

## 2023-04-07 DIAGNOSIS — R20.0 FACIAL NUMBNESS: ICD-10-CM

## 2023-04-07 DIAGNOSIS — R63.5 WEIGHT GAIN: ICD-10-CM

## 2023-04-07 DIAGNOSIS — E55.9 VITAMIN D DEFICIENCY: ICD-10-CM

## 2023-04-07 DIAGNOSIS — Z86.39 HISTORY OF IRON DEFICIENCY: ICD-10-CM

## 2023-04-07 DIAGNOSIS — I10 PRIMARY HYPERTENSION: ICD-10-CM

## 2023-04-07 PROCEDURE — 99214 OFFICE O/P EST MOD 30 MIN: CPT | Performed by: INTERNAL MEDICINE

## 2023-04-07 RX ORDER — ATORVASTATIN CALCIUM 10 MG/1
10 TABLET, FILM COATED ORAL DAILY
Qty: 90 TABLET | Refills: 1 | Status: SHIPPED | OUTPATIENT
Start: 2023-04-07 | End: 2023-06-25 | Stop reason: SDUPTHER

## 2023-04-07 ASSESSMENT — FIBROSIS 4 INDEX: FIB4 SCORE: 0.48

## 2023-04-07 ASSESSMENT — PATIENT HEALTH QUESTIONNAIRE - PHQ9: CLINICAL INTERPRETATION OF PHQ2 SCORE: 0

## 2023-04-07 NOTE — PROGRESS NOTES
"    Established Patient    Toyin presents today with the following:    CC: facial numbness, f/up for chronic medical problems    HPI:   Toyin is a 57 y.o. female who came in for above.    Since January, she has been having left-sided facial numbness involving forehead to chin. It was rare at first, but becoming more frequent in the last month, lasting for few minutes per episode, a few times a week. No other neurological symptoms.    She has been steadily gaining weight.  She feels more tired as well.  Her blood pressure is higher.        ROS:   As above    Patient Active Problem List    Diagnosis Date Noted    Morbid obesity with BMI of 45.0-49.9, adult ([facility]) 09/30/2020    Pseudoangiomatous stromal hyperplasia of breast 03/28/2018    Chronic cholecystitis 01/13/2017    Hyperlipidemia LDL goal <130     Vitamin D deficiency     Hypothyroidism     Hypertension        Current Outpatient Medications   Medication Sig Dispense Refill    atorvastatin (LIPITOR) 10 MG Tab Take 1 Tablet by mouth every day. 90 Tablet 1    triamterene/hctz (MAXZIDE-25/DYAZIDE) 37.5-25 MG Cap Take 1 Capsule by mouth every morning. 90 Capsule 0    levothyroxine (SYNTHROID) 125 MCG Tab Take 1 tab daily 5x/wk and 1.5 tabs daily 2x/wk 102 Tablet 0    vitamin D2, Ergocalciferol, (DRISDOL) 1.25 MG (48405 UT) Cap capsule Take 1 Capsule by mouth every 7 days. 12 Capsule 3    ASPIRIN 81 PO        No current facility-administered medications for this visit.         BP (!) 140/84 (Patient Position: Sitting)   Pulse 92   Temp 36.6 °C (97.8 °F) (Temporal)   Resp 16   Ht 1.6 m (5' 3\")   Wt (!) 133 kg (293 lb)   SpO2 93%   BMI 51.90 kg/m²     Physical Exam  General: Alert and oriented, No apparent distress.  Neck: Supple. No cervical or supraclavicular lymphadenopathy noted. Thyroid not enlarged.  Lungs: Clear to auscultation bilaterally without any wheezing, crepitations.  Cardiovascular: Regular rate and rhythm. No murmurs, rubs or gallops.  Abdomen: " Bowel sound +, soft, non tender, no rebound or guarding, no palpable organomegaly  Extremities: No edema.      Assessment and Plan    1. Facial numbness  Discussed the possibility of trigeminal neuralgia. Since it is a new onset since January, we will do brain MRI.  The symptom itself is not bothersome to her. So, no medication needed for symptom relief currently.  Resume aspirin 81 mg daily, increase atorvastatin for better cholesterol control.  - MR-BRAIN-W/O; Future  - VITAMIN B12; Future  - FOLATE; Future    2. Trigeminal neuralgia  - MR-BRAIN-W/O; Future    3. History of iron deficiency  History of thrombocytosis with stasis symptoms before.  Recheck CBC and check for iron deficiency.  - CBC WITH DIFFERENTIAL; Future  - FERRITIN; Future  - IRON/TOTAL IRON BIND; Future    4. IFG (impaired fasting glucose)  - HEMOGLOBIN A1C; Future    5. Mixed hyperlipidemia  Increase the atorvastatin to 10 mg daily.  - atorvastatin (LIPITOR) 10 MG Tab; Take 1 Tablet by mouth every day.  Dispense: 90 Tablet; Refill: 1    6. Other specified hypothyroidism  7. Weight gain  - TSH WITH REFLEX TO FT4; Future    8. Vitamin D deficiency  - VITAMIN D,25 HYDROXY (DEFICIENCY); Future    9. Primary hypertension  Okay to monitor BP without changing medication at this time.      Return in about 3 months (around 7/7/2023).           Signed by: Kierra Fernandez M.D.

## 2023-04-21 LAB
25(OH)D3+25(OH)D2 SERPL-MCNC: 30 NG/ML (ref 30–100)
BASOPHILS # BLD AUTO: 0.1 X10E3/UL (ref 0–0.2)
BASOPHILS NFR BLD AUTO: 1 %
EOSINOPHIL # BLD AUTO: 0.1 X10E3/UL (ref 0–0.4)
EOSINOPHIL NFR BLD AUTO: 2 %
ERYTHROCYTE [DISTWIDTH] IN BLOOD BY AUTOMATED COUNT: 13.9 % (ref 11.7–15.4)
FERRITIN SERPL-MCNC: NORMAL NG/ML
FOLATE SERPL-MCNC: 15.5 NG/ML
HBA1C MFR BLD: 7.2 % (ref 4.8–5.6)
HCT VFR BLD AUTO: 46.7 % (ref 34–46.6)
HGB BLD-MCNC: 15.3 G/DL (ref 11.1–15.9)
IMM GRANULOCYTES # BLD AUTO: 0 X10E3/UL (ref 0–0.1)
IMM GRANULOCYTES NFR BLD AUTO: 0 %
IMMATURE CELLS  115398: ABNORMAL
IRON SATN MFR SERPL: NORMAL %
IRON SERPL-MCNC: NORMAL UG/DL
LYMPHOCYTES # BLD AUTO: 2 X10E3/UL (ref 0.7–3.1)
LYMPHOCYTES NFR BLD AUTO: 28 %
MCH RBC QN AUTO: 28.6 PG (ref 26.6–33)
MCHC RBC AUTO-ENTMCNC: 32.8 G/DL (ref 31.5–35.7)
MCV RBC AUTO: 87 FL (ref 79–97)
MONOCYTES # BLD AUTO: 0.6 X10E3/UL (ref 0.1–0.9)
MONOCYTES NFR BLD AUTO: 8 %
MORPHOLOGY BLD-IMP: ABNORMAL
NEUTROPHILS # BLD AUTO: 4.2 X10E3/UL (ref 1.4–7)
NEUTROPHILS NFR BLD AUTO: 61 %
NRBC BLD AUTO-RTO: ABNORMAL %
PLATELET # BLD AUTO: 424 X10E3/UL (ref 150–450)
RBC # BLD AUTO: 5.35 X10E6/UL (ref 3.77–5.28)
REQUEST PROBLEM   100552: NORMAL
TIBC SERPL-MCNC: NORMAL UG/DL
TSH SERPL DL<=0.005 MIU/L-ACNC: NORMAL UIU/ML
UIBC SERPL-MCNC: NORMAL UG/DL
VIT B12 SERPL-MCNC: 259 PG/ML (ref 232–1245)
WBC # BLD AUTO: 6.9 X10E3/UL (ref 3.4–10.8)

## 2023-04-24 ENCOUNTER — PATIENT MESSAGE (OUTPATIENT)
Dept: MEDICAL GROUP | Facility: MEDICAL CENTER | Age: 58
End: 2023-04-24

## 2023-04-24 ENCOUNTER — APPOINTMENT (OUTPATIENT)
Dept: MEDICAL GROUP | Facility: MEDICAL CENTER | Age: 58
End: 2023-04-24
Payer: COMMERCIAL

## 2023-05-24 ENCOUNTER — OFFICE VISIT (OUTPATIENT)
Dept: MEDICAL GROUP | Facility: MEDICAL CENTER | Age: 58
End: 2023-05-24
Payer: COMMERCIAL

## 2023-05-24 VITALS
RESPIRATION RATE: 16 BRPM | WEIGHT: 293 LBS | TEMPERATURE: 97.5 F | SYSTOLIC BLOOD PRESSURE: 130 MMHG | OXYGEN SATURATION: 94 % | DIASTOLIC BLOOD PRESSURE: 88 MMHG | HEART RATE: 71 BPM | BODY MASS INDEX: 51.91 KG/M2 | HEIGHT: 63 IN

## 2023-05-24 DIAGNOSIS — Z13.820 SCREENING FOR OSTEOPOROSIS: ICD-10-CM

## 2023-05-24 DIAGNOSIS — E53.8 B12 DEFICIENCY: ICD-10-CM

## 2023-05-24 DIAGNOSIS — E03.8 OTHER SPECIFIED HYPOTHYROIDISM: ICD-10-CM

## 2023-05-24 DIAGNOSIS — Z86.39 HISTORY OF IRON DEFICIENCY: ICD-10-CM

## 2023-05-24 DIAGNOSIS — R20.0 LEFT FACIAL NUMBNESS: ICD-10-CM

## 2023-05-24 DIAGNOSIS — E55.9 VITAMIN D DEFICIENCY: ICD-10-CM

## 2023-05-24 DIAGNOSIS — Z12.31 ENCOUNTER FOR SCREENING MAMMOGRAM FOR MALIGNANT NEOPLASM OF BREAST: ICD-10-CM

## 2023-05-24 DIAGNOSIS — E78.5 HYPERLIPIDEMIA LDL GOAL <100: ICD-10-CM

## 2023-05-24 DIAGNOSIS — E11.65 TYPE 2 DIABETES MELLITUS WITH HYPERGLYCEMIA, WITHOUT LONG-TERM CURRENT USE OF INSULIN (HCC): ICD-10-CM

## 2023-05-24 PROCEDURE — 3075F SYST BP GE 130 - 139MM HG: CPT | Performed by: NURSE PRACTITIONER

## 2023-05-24 PROCEDURE — 99214 OFFICE O/P EST MOD 30 MIN: CPT | Performed by: NURSE PRACTITIONER

## 2023-05-24 PROCEDURE — 3079F DIAST BP 80-89 MM HG: CPT | Performed by: NURSE PRACTITIONER

## 2023-05-24 RX ORDER — LEVOTHYROXINE SODIUM 0.12 MG/1
TABLET ORAL
Qty: 102 TABLET | Refills: 0 | Status: SHIPPED | OUTPATIENT
Start: 2023-05-24 | End: 2023-10-11

## 2023-05-24 RX ORDER — CYANOCOBALAMIN 1000 UG/ML
1000 INJECTION, SOLUTION INTRAMUSCULAR; SUBCUTANEOUS ONCE
Status: COMPLETED | OUTPATIENT
Start: 2023-05-24 | End: 2023-05-24

## 2023-05-24 RX ORDER — LEVOTHYROXINE SODIUM 0.12 MG/1
TABLET ORAL
Qty: 30 TABLET | Refills: 0 | Status: SHIPPED
Start: 2023-05-24 | End: 2023-10-11

## 2023-05-24 RX ADMIN — CYANOCOBALAMIN 1000 MCG: 1000 INJECTION, SOLUTION INTRAMUSCULAR; SUBCUTANEOUS at 17:48

## 2023-05-24 ASSESSMENT — FIBROSIS 4 INDEX: FIB4 SCORE: 0.52

## 2023-05-25 NOTE — PROGRESS NOTES
Subjective:     Toyin Gallagher is a 57 y.o. female who presents with fatigue.    HPI:   Seen in f/u for fatigue.   She has been tired since december.  She was recently seen by Dr Fernandez,  she is having left facial numbness.  Suspicious for triginimal neuralgia.  She will having her MRI brain on saturday.  Reviewed lab with pt.  Ferritin, TSH, FE/TIBC wasn't done d/t lack of blood.  Vitamin d is low normal.  She is now back on ergocalciferol weekly since december.  A1c is up from 5.7 several years ago to 7.2.  she has not been on healthy diet.  Has since seeing lab stopped sodas.  Starting to walk for exercise.    B12 is low normal at 259.  She is not on otc supplement.  Folate,, CBC is w nl.  CMP is wnl except glucose now elevated at 128.  She started eating more carbs last summer.      Patient Active Problem List    Diagnosis Date Noted    Morbid obesity with BMI of 45.0-49.9, adult ([facility]) 09/30/2020    Pseudoangiomatous stromal hyperplasia of breast 03/28/2018    Chronic cholecystitis 01/13/2017    Hyperlipidemia LDL goal <130     Vitamin D deficiency     Hypothyroidism     Hypertension        Current medicines (including changes today)  Current Outpatient Medications   Medication Sig Dispense Refill    levothyroxine (SYNTHROID) 125 MCG Tab Take 1 tab daily 5x/wk and 1.5 tabs daily 2x/wk 30 Tablet 0    levothyroxine (SYNTHROID) 125 MCG Tab Take 1 tab daily 5x/wk and 1.5 tabs daily 2x/wk 102 Tablet 0    atorvastatin (LIPITOR) 10 MG Tab Take 1 Tablet by mouth every day. 90 Tablet 1    triamterene/hctz (MAXZIDE-25/DYAZIDE) 37.5-25 MG Cap Take 1 Capsule by mouth every morning. 90 Capsule 0    vitamin D2, Ergocalciferol, (DRISDOL) 1.25 MG (50121 UT) Cap capsule Take 1 Capsule by mouth every 7 days. 12 Capsule 3    ASPIRIN 81 PO        No current facility-administered medications for this visit.       No Known Allergies    ROS  Constitutional: Negative. Negative for fever, chills, weight loss, malaise/fatigue and  "diaphoresis.   HENT: Negative. Negative for hearing loss, ear pain, nosebleeds, congestion, sore throat, neck pain, tinnitus and ear discharge.   Respiratory: Negative. Negative for cough, hemoptysis, sputum production, shortness of breath, wheezing and stridor.   Cardiovascular: Negative. Negative for chest pain, palpitations, orthopnea, claudication, leg swelling and PND.   Gastrointestinal: Denies nausea, vomiting, diarrhea, constipation, heartburn, melena or hematochezia.  Genitourinary: Denies dysuria, hematuria, urinary incontinence, frequency or urgency.        Objective:     /88 (BP Location: Right arm, Patient Position: Sitting, BP Cuff Size: Adult long)   Pulse 71   Temp 36.4 °C (97.5 °F) (Temporal)   Resp 16   Ht 1.6 m (5' 3\")   Wt (!) 134 kg (296 lb)   SpO2 94%  Body mass index is 52.43 kg/m².    Physical Exam:  Vitals reviewed.  Constitutional: Oriented to person, place, and time. appears well-developed and well-nourished. No distress.   Cardiovascular: Normal rate, regular rhythm, normal heart sounds and intact distal pulses. Exam reveals no gallop and no friction rub. No murmur heard. No carotid bruits.   Pulmonary/Chest: Effort normal and breath sounds normal. No stridor. No respiratory distress. no wheezes or rales. exhibits no tenderness.   Musculoskeletal: Normal range of motion. exhibits no edema. chasidy pedal pulses 2+.  Lymphadenopathy: No cervical or supraclavicular adenopathy.   Neurological: Alert and oriented to person, place, and time. exhibits normal muscle tone.  Skin: Skin is warm and dry. No diaphoresis.   Psychiatric: Normal mood and affect. Behavior is normal.      Assessment and Plan:     The following treatment plan was discussed:    1. Type 2 diabetes mellitus with hyperglycemia, without long-term current use of insulin (HCC)  MICROALBUMIN CREAT RATIO URINE    HEMOGLOBIN A1C    new dx.  she is going to improve diet and exercise. plan: judy lab 3 mo.  f/u for lab review " and pap smear      2. Hyperlipidemia LDL goal <100  CMP14+LP    LP not controlled.  improve helathy lifestyle.  judy lab 3 mo.       3. B12 deficiency  cyanocobalamin (VITAMIN B-12) injection 1,000 mcg    VITAMIN B12    b12 low normal.  give b12 inj today. start b12 1000 mcg daily otc. judy lab 3 mo.  f/u for review      4. Other specified hypothyroidism  levothyroxine (SYNTHROID) 125 MCG Tab    levothyroxine (SYNTHROID) 125 MCG Tab    TSH    FREE THYROXINE    THYROID PEROXIDASE  (TPO) AB    refill meds.  stable on meds. needs updated lab since not done with recent lab.  f/u w/pt w/results      5. History of iron deficiency  FERRITIN    IRON/TOTAL IRON BIND    needs updated ferritin and FE/TIBC since not done with lab as ordered      6. Vitamin D deficiency  VITAMIN D,25 HYDROXY (DEFICIENCY)    vitamin d is low normal.  continue ergocalciferol weekly. judy d in 3 mo      7. Encounter for screening mammogram for malignant neoplasm of breast  MA-SCREENING MAMMO BILAT W/CAD      8. Screening for osteoporosis  DS-BONE DENSITY STUDY (DEXA)    dexa scan ordered      9. Left facial numbness      she will do MRI brain as sched this sat. f/u w/pt w/result            Followup: Return in about 3 months (around 8/24/2023), or for lab review.

## 2023-06-10 LAB
FERRITIN SERPL-MCNC: 55 NG/ML (ref 15–150)
IRON SATN MFR SERPL: 19 % (ref 15–55)
IRON SERPL-MCNC: 62 UG/DL (ref 27–159)
T4 FREE SERPL-MCNC: 1.71 NG/DL (ref 0.82–1.77)
THYROPEROXIDASE AB SERPL-ACNC: 243 IU/ML (ref 0–34)
TIBC SERPL-MCNC: 326 UG/DL (ref 250–450)
TSH SERPL DL<=0.005 MIU/L-ACNC: 1.26 UIU/ML (ref 0.45–4.5)
UIBC SERPL-MCNC: 264 UG/DL (ref 131–425)

## 2023-06-25 DIAGNOSIS — E78.2 MIXED HYPERLIPIDEMIA: ICD-10-CM

## 2023-06-25 RX ORDER — ATORVASTATIN CALCIUM 10 MG/1
10 TABLET, FILM COATED ORAL DAILY
Qty: 90 TABLET | Refills: 1 | Status: SHIPPED | OUTPATIENT
Start: 2023-06-25 | End: 2023-10-11 | Stop reason: SDUPTHER

## 2023-07-14 ENCOUNTER — HOSPITAL ENCOUNTER (OUTPATIENT)
Dept: RADIOLOGY | Facility: MEDICAL CENTER | Age: 58
End: 2023-07-14
Attending: NURSE PRACTITIONER
Payer: COMMERCIAL

## 2023-07-14 DIAGNOSIS — Z13.820 SCREENING FOR OSTEOPOROSIS: ICD-10-CM

## 2023-07-14 DIAGNOSIS — Z12.31 ENCOUNTER FOR SCREENING MAMMOGRAM FOR MALIGNANT NEOPLASM OF BREAST: ICD-10-CM

## 2023-07-14 PROCEDURE — 77080 DXA BONE DENSITY AXIAL: CPT

## 2023-07-14 PROCEDURE — 77063 BREAST TOMOSYNTHESIS BI: CPT

## 2023-07-20 DIAGNOSIS — E03.8 OTHER SPECIFIED HYPOTHYROIDISM: ICD-10-CM

## 2023-07-20 RX ORDER — LEVOTHYROXINE SODIUM 0.12 MG/1
TABLET ORAL
Qty: 102 TABLET | Refills: 0 | Status: SHIPPED | OUTPATIENT
Start: 2023-07-20 | End: 2023-10-11 | Stop reason: SDUPTHER

## 2023-08-18 DIAGNOSIS — I10 HYPERTENSION, ESSENTIAL: ICD-10-CM

## 2023-08-19 RX ORDER — TRIAMTERENE AND HYDROCHLOROTHIAZIDE 37.5; 25 MG/1; MG/1
1 CAPSULE ORAL EVERY MORNING
Qty: 90 CAPSULE | Refills: 0 | Status: SHIPPED | OUTPATIENT
Start: 2023-08-19 | End: 2023-10-11 | Stop reason: SDUPTHER

## 2023-09-01 LAB
25(OH)D3+25(OH)D2 SERPL-MCNC: 34.5 NG/ML (ref 30–100)
ALBUMIN SERPL-MCNC: 4.2 G/DL (ref 3.8–4.9)
ALBUMIN/CREAT UR: <5 MG/G CREAT (ref 0–29)
ALBUMIN/GLOB SERPL: 1.5 {RATIO} (ref 1.2–2.2)
ALP SERPL-CCNC: 91 IU/L (ref 44–121)
ALT SERPL-CCNC: 17 IU/L (ref 0–32)
AST SERPL-CCNC: 18 IU/L (ref 0–40)
BILIRUB SERPL-MCNC: 0.7 MG/DL (ref 0–1.2)
BUN SERPL-MCNC: 13 MG/DL (ref 6–24)
BUN/CREAT SERPL: 16 (ref 9–23)
CALCIUM SERPL-MCNC: 9.4 MG/DL (ref 8.7–10.2)
CHLORIDE SERPL-SCNC: 98 MMOL/L (ref 96–106)
CHOLEST SERPL-MCNC: 168 MG/DL (ref 100–199)
CO2 SERPL-SCNC: 24 MMOL/L (ref 20–29)
CREAT SERPL-MCNC: 0.8 MG/DL (ref 0.57–1)
CREAT UR-MCNC: 58.2 MG/DL
EGFRCR SERPLBLD CKD-EPI 2021: 87 ML/MIN/1.73
GLOBULIN SER CALC-MCNC: 2.8 G/DL (ref 1.5–4.5)
GLUCOSE SERPL-MCNC: 124 MG/DL (ref 70–99)
HBA1C MFR BLD: 6.8 % (ref 4.8–5.6)
HDLC SERPL-MCNC: 33 MG/DL
LABORATORY COMMENT REPORT: ABNORMAL
LDLC SERPL CALC-MCNC: 90 MG/DL (ref 0–99)
MICROALBUMIN UR-MCNC: <3 UG/ML
POTASSIUM SERPL-SCNC: 4.3 MMOL/L (ref 3.5–5.2)
PROT SERPL-MCNC: 7 G/DL (ref 6–8.5)
SODIUM SERPL-SCNC: 137 MMOL/L (ref 134–144)
TRIGL SERPL-MCNC: 270 MG/DL (ref 0–149)
VIT B12 SERPL-MCNC: 1257 PG/ML (ref 232–1245)
VLDLC SERPL CALC-MCNC: 45 MG/DL (ref 5–40)

## 2023-10-11 ENCOUNTER — HOSPITAL ENCOUNTER (OUTPATIENT)
Facility: MEDICAL CENTER | Age: 58
End: 2023-10-11
Attending: NURSE PRACTITIONER
Payer: COMMERCIAL

## 2023-10-11 ENCOUNTER — OFFICE VISIT (OUTPATIENT)
Dept: MEDICAL GROUP | Facility: MEDICAL CENTER | Age: 58
End: 2023-10-11
Payer: COMMERCIAL

## 2023-10-11 VITALS
DIASTOLIC BLOOD PRESSURE: 70 MMHG | RESPIRATION RATE: 17 BRPM | BODY MASS INDEX: 49.61 KG/M2 | HEIGHT: 63 IN | SYSTOLIC BLOOD PRESSURE: 130 MMHG | OXYGEN SATURATION: 97 % | WEIGHT: 280 LBS | TEMPERATURE: 96.8 F | HEART RATE: 63 BPM

## 2023-10-11 DIAGNOSIS — Z12.11 SCREENING FOR MALIGNANT NEOPLASM OF COLON: ICD-10-CM

## 2023-10-11 DIAGNOSIS — E55.9 VITAMIN D DEFICIENCY: ICD-10-CM

## 2023-10-11 DIAGNOSIS — Z12.72 SMEAR, VAGINAL, AS PART OF ROUTINE GYNECOLOGICAL EXAMINATION: ICD-10-CM

## 2023-10-11 DIAGNOSIS — Z12.4 ROUTINE CERVICAL SMEAR: ICD-10-CM

## 2023-10-11 DIAGNOSIS — Z01.419 SMEAR, VAGINAL, AS PART OF ROUTINE GYNECOLOGICAL EXAMINATION: ICD-10-CM

## 2023-10-11 DIAGNOSIS — E78.5 HYPERLIPIDEMIA LDL GOAL <100: ICD-10-CM

## 2023-10-11 DIAGNOSIS — I10 HYPERTENSION, ESSENTIAL: ICD-10-CM

## 2023-10-11 DIAGNOSIS — R73.01 IFG (IMPAIRED FASTING GLUCOSE): ICD-10-CM

## 2023-10-11 DIAGNOSIS — E78.1 HYPERTRIGLYCERIDEMIA: ICD-10-CM

## 2023-10-11 DIAGNOSIS — Z23 NEED FOR INFLUENZA VACCINATION: ICD-10-CM

## 2023-10-11 DIAGNOSIS — Z23 NEED FOR TDAP VACCINATION: ICD-10-CM

## 2023-10-11 DIAGNOSIS — E03.9 ACQUIRED HYPOTHYROIDISM: ICD-10-CM

## 2023-10-11 DIAGNOSIS — E53.8 B12 DEFICIENCY: ICD-10-CM

## 2023-10-11 PROBLEM — E11.51 DM (DIABETES MELLITUS) TYPE II, CONTROLLED, WITH PERIPHERAL VASCULAR DISORDER (HCC): Status: ACTIVE | Noted: 2023-10-11

## 2023-10-11 PROCEDURE — 90471 IMMUNIZATION ADMIN: CPT | Performed by: NURSE PRACTITIONER

## 2023-10-11 PROCEDURE — 87624 HPV HI-RISK TYP POOLED RSLT: CPT

## 2023-10-11 PROCEDURE — 90715 TDAP VACCINE 7 YRS/> IM: CPT | Performed by: NURSE PRACTITIONER

## 2023-10-11 PROCEDURE — 90472 IMMUNIZATION ADMIN EACH ADD: CPT | Performed by: NURSE PRACTITIONER

## 2023-10-11 PROCEDURE — 99396 PREV VISIT EST AGE 40-64: CPT | Mod: 25 | Performed by: NURSE PRACTITIONER

## 2023-10-11 PROCEDURE — 90686 IIV4 VACC NO PRSV 0.5 ML IM: CPT | Performed by: NURSE PRACTITIONER

## 2023-10-11 PROCEDURE — 88175 CYTOPATH C/V AUTO FLUID REDO: CPT

## 2023-10-11 PROCEDURE — 3078F DIAST BP <80 MM HG: CPT | Performed by: NURSE PRACTITIONER

## 2023-10-11 PROCEDURE — 3075F SYST BP GE 130 - 139MM HG: CPT | Performed by: NURSE PRACTITIONER

## 2023-10-11 RX ORDER — TRIAMTERENE AND HYDROCHLOROTHIAZIDE 37.5; 25 MG/1; MG/1
1 CAPSULE ORAL EVERY MORNING
Qty: 100 CAPSULE | Refills: 3 | Status: SHIPPED | OUTPATIENT
Start: 2023-10-11

## 2023-10-11 RX ORDER — FENOFIBRATE 48 MG/1
48 TABLET, COATED ORAL DAILY
Qty: 30 TABLET | Refills: 3 | Status: SHIPPED | OUTPATIENT
Start: 2023-10-11 | End: 2024-02-21 | Stop reason: SDUPTHER

## 2023-10-11 RX ORDER — LEVOTHYROXINE SODIUM 0.12 MG/1
TABLET ORAL
Qty: 102 TABLET | Refills: 3 | Status: SHIPPED | OUTPATIENT
Start: 2023-10-11

## 2023-10-11 RX ORDER — ERGOCALCIFEROL 1.25 MG/1
50000 CAPSULE ORAL
Qty: 12 CAPSULE | Refills: 3 | Status: SHIPPED | OUTPATIENT
Start: 2023-10-11

## 2023-10-11 RX ORDER — ATORVASTATIN CALCIUM 10 MG/1
10 TABLET, FILM COATED ORAL DAILY
Qty: 100 TABLET | Refills: 3 | Status: SHIPPED | OUTPATIENT
Start: 2023-10-11

## 2023-10-11 ASSESSMENT — FIBROSIS 4 INDEX: FIB4 SCORE: 0.58

## 2023-10-11 NOTE — PROGRESS NOTES
Subjective     Toyin Gallagher is a 58 y.o. female who presents with Gynecologic Exam            HPI  Seen in f/u for PE/PAP SMEAR.  She is feeling well.  She is due refills on meds.  Taking levothyroxine approp.  No s/e to med.  Stable on med  She is on lipitor for her chol.  She is on healthy diet.  She is walking daily for exercise.  No s/e to med.   She is on year round ergocalciferol d/t chronic low vitamin d.  She doesn't remember when wnl off med.  She is on maxzide for BP control and swelling.  Working well.  Bp stable and controlled.  Better today than been long term.  No s/e to med.  Reviewed risks and benefits of tricor,, tdap, flu with pt including but not limited to: rhabdo, myalgias, guillan barre, elelvated lft's.  Reviewed lab with pt.  B12, alb/cr ratio, CMP IS WNL except glucose elevated.  A1c is down from 7.2 to 6.8  Vitamin d i sup from 30 to 34.  This is with weekly erccalfciferol.  LP shows trg are down from 309 to 270.  Has not been normal long term.  HDL chronic low in 30's.  VLDL is down from 54 to 45.  LDL down from 115 to goal ag 90.  Goal is <100.   She is due tdap, flu and cologuard.       Patient Active Problem List   Diagnosis    Hyperlipidemia LDL goal <130    Vitamin D deficiency    Hypothyroidism    Hypertension    Chronic cholecystitis    Pseudoangiomatous stromal hyperplasia of breast    Morbid obesity with BMI of 45.0-49.9, adult (HCC)    DM (diabetes mellitus) type II, controlled, with peripheral vascular disorder (HCC)     Current Outpatient Medications   Medication Sig Dispense Refill    triamterene/hctz (MAXZIDE-25/DYAZIDE) 37.5-25 MG Cap Take 1 Capsule by mouth every morning. 100 Capsule 3    levothyroxine (SYNTHROID) 125 MCG Tab TAKE ONE TABLET BY MOUTH 5 TIMES A WEEK AND TAKE ONE AND ONE HALF TABLETS BY MOUTH TWO TIMES A WEEK 102 Tablet 3    atorvastatin (LIPITOR) 10 MG Tab Take 1 Tablet by mouth every day. 100 Tablet 3    vitamin D2, Ergocalciferol, (DRISDOL) 1.25 MG  "(06207 UT) Cap capsule Take 1 Capsule by mouth every 7 days. 12 Capsule 3    fenofibrate (TRICOR) 48 MG Tab Take 1 Tablet by mouth every day. 30 Tablet 3    ASPIRIN 81 PO        No current facility-administered medications for this visit.     Patient has no known allergies.        ROS  Review of Systems   Constitutional: Negative.  Negative for fever, chills, weight loss, malaise/fatigue and diaphoresis.   HENT: Negative.  Negative for hearing loss, ear pain, nosebleeds, congestion, sore throat, neck pain, tinnitus and ear discharge.    Eyes: Negative.  Negative for blurred vision, double vision, photophobia, pain, discharge and redness.   Respiratory: Negative.  Negative for cough, hemoptysis, sputum production, shortness of breath, wheezing and stridor.    Cardiovascular: Negative.  Negative for chest pain, palpitations, orthopnea, claudication, PND.   Gastrointestinal: Negative.  Negative for heartburn, nausea, vomiting, abdominal pain, diarrhea, constipation, blood in stool and melena.   Genitourinary: Negative.  Negative for dysuria, urgency, frequency, incontinence, hematuria and flank pain.   Musculoskeletal: Negative.  Negative for myalgias, back pain, falls.   Skin: Negative.  Negative for itching and rash.   Neurological: Negative.  Negative for dizziness, tingling, tremors, sensory change, speech change, focal weakness, seizures, loss of consciousness, weakness and headaches.   Endo/Heme/Allergies: Negative.  Negative for environmental allergies and polydipsia. Does not bruise/bleed easily.   Psychiatric/Behavioral: Negative.  Negative for depression, suicidal ideas, hallucinations, memory loss and substance abuse. The patient is not nervous/anxious and does not have insomnia.    All other systems reviewed and are negative.    Objective     /70 (BP Location: Right arm, Patient Position: Sitting, BP Cuff Size: Large adult)   Pulse 63   Temp 36 °C (96.8 °F) (Temporal)   Resp 17   Ht 1.6 m (5' 3\")  "  Wt (!) 127 kg (280 lb)   SpO2 97%   BMI 49.60 kg/m²      Physical Exam  Physical Exam   Vitals reviewed.  Constitutional: oriented to person, place, and time. appears well-developed and well-nourished. No distress.   HENT: Head: Normocephalic and atraumatic. Bilateral tympanic membranes wnl w/o bulging.  Right Ear: External ear normal. Left Ear: External ear normal. Nose: Nose normal.  Mouth/Throat: Oropharynx is clear and moist. No oropharyngeal exudate. chasidy tm wnl. Eyes: Conjunctivae and EOM are normal. Pupils are equal, round, and reactive to light. Right eye exhibits no discharge. Left eye exhibits no discharge. No scleral icterus.    Neck: Normal range of motion. Neck supple. No JVD present.   Cardiovascular: Normal rate, regular rhythm, normal heart sounds and intact distal pulses.  Exam reveals no gallop and no friction rub.  No murmur heard.  No carotid bruits   Pulmonary/Chest: Effort normal and breath sounds normal. No stridor. No respiratory distress. no wheezes or rales. exhibits no tenderness.   Abdominal: Soft. Bowel sounds are normal. exhibits no distension and no mass. No tenderness. no rebound and no guarding.   Musculoskeletal: Normal range of motion. exhibits no edema or tenderness.  chasidy pedal pulses 2+.  Lymphadenopathy:  no cervical or supraclavicular adenopathy.   Neurological: alert and oriented to person, place, and time. has normal reflexes. displays normal reflexes. No cranial nerve deficit. exhibits normal muscle tone. Coordination normal.   Skin: Skin is warm and dry. No rash noted. no diaphoresis. No erythema. No pallor.   Psychiatric: normal mood and affect. behavior is normal.   SUBJECTIVE: 58 y.o. female for annual routine pap and checkup.  Gyn History:   Last Pap: 7/15/2020  Contraception: none  H/O Abnormal Pap no  H/O STI none  Current partner: monogamous  Lmp: 2023  ROS:  No pelvic pain, vaginal discharge or dyspareunia.  No breast tenderness, mass, nipple discharge,  changes in size or contour, or abnormal cyclic discomfort.   Breast Exam:Performed with instruction during examination. Breasts equal size and shape.  No axillary lymphadenopathy, no skin changes, no dominant masses. No nipple retraction  Pelvic Exam - Sure Path Pap obtained and specimen sent to lab. Normal external genitalia with no lesions. Normal vaginal mucosa with normal rugation. Cervix has no visible lesions. No cervical motion tenderness. Uterus is normal sized with no masses. No adnexal tenderness or enlargement appreciated.      Assessment & Plan         1. Smear, vaginal, as part of routine gynecological examination  THINPREP PAP WITH HPV    f/u w/pt w/pap smear results.        2. Routine cervical smear  THINPREP PAP WITH HPV      3. IFG (impaired fasting glucose)      a1c down from 7.2 to 6.8. not on med for DM. plan: judy a1c 4 mo. start med if not improved. f/u for review call for lab slip      4. Hypertension, essential  triamterene/hctz (MAXZIDE-25/DYAZIDE) 37.5-25 MG Cap    stable on med.  refill meds       5. Acquired hypothyroidism  levothyroxine (SYNTHROID) 125 MCG Tab    stable on med.  RF med      6. Hypertriglyceridemia  fenofibrate (TRICOR) 48 MG Tab    Comp Metabolic Panel    Lipid Profile    trg poorly controlled long term despite healthy diet and daily exercise. add tricor 48 mg daily. judy lab 2 mo. f/u for review      7. Hyperlipidemia LDL goal <100  atorvastatin (LIPITOR) 10 MG Tab    LDL stable on lipitor.  HDL  chronic low.  RF lipitor      8. Vitamin D deficiency  vitamin D2, Ergocalciferol, (DRISDOL) 1.25 MG (99758 UT) Cap capsule    vitamin d low despite every 7 day ergocalciferol.  RF med      9. B12 deficiency      stable and well controlled on otc supplement      10. Need for Tdap vaccination  Tdap =>8yo IM    Tdap and flu shot given today      11. Need for influenza vaccination  INFLUENZA VACCINE QUAD INJ (PF)      12. Screening for malignant neoplasm of colon  COLOGUARD (FIT  DNA)    cologuard ordered

## 2023-10-13 LAB
CYTOLOGIST CVX/VAG CYTO: NORMAL
CYTOLOGY CVX/VAG DOC CYTO: NORMAL
CYTOLOGY CVX/VAG DOC THIN PREP: NORMAL
HPV I/H RISK 4 DNA CVX QL PROBE+SIG AMP: NEGATIVE
NOTE NL11727A: NORMAL
OTHER STN SPEC: NORMAL
STAT OF ADQ CVX/VAG CYTO-IMP: NORMAL

## 2023-12-14 NOTE — TELEPHONE ENCOUNTER
Caller: Philomena Davis    Relationship: Self    Best call back number: [phone]    What medication are you requesting: MEDICATION REQUEST    What are your current symptoms: CHEST CONGESTION; COUGHING    How long have you been experiencing symptoms: 8 WEEKS    Have you had these symptoms before:    [x] Yes  [] No    Have you been treated for these symptoms before:   [x] Yes  [] No    If a prescription is needed, what is your preferred pharmacy and phone number:  [facility] [id number]    Additional notes: PATIENT STATED THAT SHE WOULD LIKE TO SEE ABOUT GETTING MEDICATION CALLED INTO PHARMACY FOR SYMPTOMS ABOVE; PATIENT STATED THAT THIS HAS BEEN ISSUE THAT IS ONGOING FOR A WHILE AND SEEMS LIKE NO MEDICATION IS HELPING AT THIS POINT WHAT TO DO BECAUSE COUGHING IS GETTING WORSE    PLEASE ADVISE         Tried to contact patient, left message for patient to call [phone]

## 2024-02-07 ENCOUNTER — APPOINTMENT (OUTPATIENT)
Dept: MEDICAL GROUP | Facility: MEDICAL CENTER | Age: 59
End: 2024-02-07
Payer: COMMERCIAL

## 2024-02-10 LAB
ALBUMIN SERPL-MCNC: 4.2 G/DL (ref 3.8–4.9)
ALBUMIN/GLOB SERPL: 1.7 {RATIO} (ref 1.2–2.2)
ALP SERPL-CCNC: 71 IU/L (ref 44–121)
ALT SERPL-CCNC: 16 IU/L (ref 0–32)
AST SERPL-CCNC: 15 IU/L (ref 0–40)
BILIRUB SERPL-MCNC: 0.3 MG/DL (ref 0–1.2)
BUN SERPL-MCNC: 12 MG/DL (ref 6–24)
BUN/CREAT SERPL: 14 (ref 9–23)
CALCIUM SERPL-MCNC: 9 MG/DL (ref 8.7–10.2)
CHLORIDE SERPL-SCNC: 103 MMOL/L (ref 96–106)
CHOLEST SERPL-MCNC: 189 MG/DL (ref 100–199)
CO2 SERPL-SCNC: 24 MMOL/L (ref 20–29)
CREAT SERPL-MCNC: 0.87 MG/DL (ref 0.57–1)
EGFRCR SERPLBLD CKD-EPI 2021: 78 ML/MIN/1.73
GLOBULIN SER CALC-MCNC: 2.5 G/DL (ref 1.5–4.5)
GLUCOSE SERPL-MCNC: 113 MG/DL (ref 70–99)
HDLC SERPL-MCNC: 43 MG/DL
LABORATORY COMMENT REPORT: ABNORMAL
LDLC SERPL CALC-MCNC: 116 MG/DL (ref 0–99)
POTASSIUM SERPL-SCNC: 4.4 MMOL/L (ref 3.5–5.2)
PROT SERPL-MCNC: 6.7 G/DL (ref 6–8.5)
SODIUM SERPL-SCNC: 140 MMOL/L (ref 134–144)
TRIGL SERPL-MCNC: 170 MG/DL (ref 0–149)
VLDLC SERPL CALC-MCNC: 30 MG/DL (ref 5–40)

## 2024-02-21 ENCOUNTER — OFFICE VISIT (OUTPATIENT)
Dept: MEDICAL GROUP | Facility: MEDICAL CENTER | Age: 59
End: 2024-02-21
Payer: COMMERCIAL

## 2024-02-21 VITALS
DIASTOLIC BLOOD PRESSURE: 76 MMHG | BODY MASS INDEX: 48.58 KG/M2 | SYSTOLIC BLOOD PRESSURE: 120 MMHG | HEIGHT: 64 IN | WEIGHT: 284.56 LBS | HEART RATE: 65 BPM | RESPIRATION RATE: 18 BRPM | OXYGEN SATURATION: 96 % | TEMPERATURE: 98.4 F

## 2024-02-21 DIAGNOSIS — E53.8 B12 DEFICIENCY: ICD-10-CM

## 2024-02-21 DIAGNOSIS — E03.9 ACQUIRED HYPOTHYROIDISM: ICD-10-CM

## 2024-02-21 DIAGNOSIS — E55.9 VITAMIN D DEFICIENCY: ICD-10-CM

## 2024-02-21 DIAGNOSIS — E78.1 HYPERTRIGLYCERIDEMIA: ICD-10-CM

## 2024-02-21 DIAGNOSIS — R73.01 IFG (IMPAIRED FASTING GLUCOSE): ICD-10-CM

## 2024-02-21 DIAGNOSIS — E78.5 HYPERLIPIDEMIA LDL GOAL <100: ICD-10-CM

## 2024-02-21 DIAGNOSIS — E11.51 DM (DIABETES MELLITUS) TYPE II, CONTROLLED, WITH PERIPHERAL VASCULAR DISORDER (HCC): ICD-10-CM

## 2024-02-21 LAB
HBA1C MFR BLD: 6.8 % (ref ?–5.8)
POCT INT CON NEG: NEGATIVE
POCT INT CON POS: POSITIVE

## 2024-02-21 PROCEDURE — 3074F SYST BP LT 130 MM HG: CPT | Performed by: NURSE PRACTITIONER

## 2024-02-21 PROCEDURE — 3078F DIAST BP <80 MM HG: CPT | Performed by: NURSE PRACTITIONER

## 2024-02-21 PROCEDURE — 83036 HEMOGLOBIN GLYCOSYLATED A1C: CPT | Performed by: NURSE PRACTITIONER

## 2024-02-21 PROCEDURE — 99214 OFFICE O/P EST MOD 30 MIN: CPT | Performed by: NURSE PRACTITIONER

## 2024-02-21 RX ORDER — FENOFIBRATE 48 MG/1
48 TABLET, COATED ORAL DAILY
Qty: 90 TABLET | Refills: 3 | Status: SHIPPED | OUTPATIENT
Start: 2024-02-21

## 2024-02-21 ASSESSMENT — FIBROSIS 4 INDEX: FIB4 SCORE: 0.5

## 2024-02-21 ASSESSMENT — PATIENT HEALTH QUESTIONNAIRE - PHQ9: CLINICAL INTERPRETATION OF PHQ2 SCORE: 0

## 2024-02-21 NOTE — PROGRESS NOTES
Subjective:     Toyin Gallagher is a 58 y.o. female who presents with hyperlipidemia.    HPI:   Seen in f/u for hyperlipidemia.  She has been busy with working on her home.  She is on a healhty diet.  Very active.    Her previous a1c was up to 7.2.  she improved healthy lifestyle and brought a1c down to 6.8 last august.  Continues on healthy lifestyle.  Wants to postpone med tx for DM if poss.   She was placed on tricor for her trg in addition to the lipitor that she was already on.  Taking meds approp.   No s/e to meds. She is taking lipitor 10 mg daily.    Reviewed lab with pt.  CMP, GFR is wnl except elevated glucose.  LP shows trg down from 309 in 3/23.  Then down to 270 last august.  Now down to 170.  HDL up from chronic low 30's to 43.  LDL is up from 90 to 116.  LDL goal is <100.    She is stable on year around ergocalciferol.  Taking w/o s/e.    She is stable on levothyroxine for her hypothyroidism.  She was having low energy but after started B12 her energy has returned.       Patient Active Problem List    Diagnosis Date Noted    DM (diabetes mellitus) type II, controlled, with peripheral vascular disorder (HCC) 10/11/2023    Morbid obesity with BMI of 45.0-49.9, adult ([facility]) 09/30/2020    Pseudoangiomatous stromal hyperplasia of breast 03/28/2018    Chronic cholecystitis 01/13/2017    Hyperlipidemia LDL goal <130     Vitamin D deficiency     Hypothyroidism     Hypertension        Current medicines (including changes today)  Current Outpatient Medications   Medication Sig Dispense Refill    fenofibrate (TRICOR) 48 MG Tab Take 1 Tablet by mouth every day. 90 Tablet 3    metFORMIN (GLUCOPHAGE) 500 MG Tab Take 1 Tablet by mouth 2 times a day with meals. 60 Tablet 4    triamterene/hctz (MAXZIDE-25/DYAZIDE) 37.5-25 MG Cap Take 1 Capsule by mouth every morning. 100 Capsule 3    levothyroxine (SYNTHROID) 125 MCG Tab TAKE ONE TABLET BY MOUTH 5 TIMES A WEEK AND TAKE ONE AND ONE HALF TABLETS BY MOUTH TWO TIMES  "A WEEK 102 Tablet 3    atorvastatin (LIPITOR) 10 MG Tab Take 1 Tablet by mouth every day. 100 Tablet 3    vitamin D2, Ergocalciferol, (DRISDOL) 1.25 MG (70278 UT) Cap capsule Take 1 Capsule by mouth every 7 days. 12 Capsule 3    ASPIRIN 81 PO        No current facility-administered medications for this visit.       No Known Allergies    ROS  Constitutional: Negative. Negative for fever, chills, weight loss, malaise/fatigue and diaphoresis.   HENT: Negative. Negative for hearing loss, ear pain, nosebleeds, congestion, sore throat, neck pain, tinnitus and ear discharge.   Respiratory: Negative. Negative for cough, hemoptysis, sputum production, shortness of breath, wheezing and stridor.   Cardiovascular: Negative. Negative for chest pain, palpitations, orthopnea, claudication, leg swelling and PND.   Gastrointestinal: Denies nausea, vomiting, diarrhea, constipation, heartburn, melena or hematochezia.  Genitourinary: Denies dysuria, hematuria, urinary incontinence, frequency or urgency.        Objective:     /76 (BP Location: Left arm, Patient Position: Sitting, BP Cuff Size: Large adult)   Pulse 65   Temp 36.9 °C (98.4 °F) (Temporal)   Resp 18   Ht 1.63 m (5' 4.17\")   Wt (!) 129 kg (284 lb 9 oz)   SpO2 96%  Body mass index is 48.58 kg/m².    Physical Exam:  Vitals reviewed.  Constitutional: Oriented to person, place, and time. appears well-developed and well-nourished. No distress.   Cardiovascular: Normal rate, regular rhythm, normal heart sounds and intact distal pulses. Exam reveals no gallop and no friction rub. No murmur heard. No carotid bruits.   Pulmonary/Chest: Effort normal and breath sounds normal. No stridor. No respiratory distress. no wheezes or rales. exhibits no tenderness.   Musculoskeletal: Normal range of motion. exhibits no edema. chaisdy pedal pulses 2+.  Lymphadenopathy: No cervical or supraclavicular adenopathy.   Neurological: Alert and oriented to person, place, and time. exhibits " normal muscle tone.  Skin: Skin is warm and dry. No diaphoresis.   Psychiatric: Normal mood and affect. Behavior is normal.      Assessment and Plan:     The following treatment plan was discussed:    1. Hypertriglyceridemia  fenofibrate (TRICOR) 48 MG Tab    Comp Metabolic Panel    trg better controlled on tricor. RF med. continue healthy diet and regular exercise. judy lab 4 mo. f/u for review.      2. DM (diabetes mellitus) type II, controlled, with peripheral vascular disorder (HCC)  metFORMIN (GLUCOPHAGE) 500 MG Tab    POCT  A1C    a1c stable at 6.8. enc pt to improve healthy lifestyle. judy lab 4 mo. will start med for DM is worse      3. IFG (impaired fasting glucose)  Comp Metabolic Panel    HEMOGLOBIN A1C    MICROALBUMIN CREAT RATIO URINE    a1c now better controlled with improved diet & some exercise. judy lab, f/u for review 4 mo.      4. Acquired hypothyroidism  TSH    FREE THYROXINE    VITAMIN D,25 HYDROXY (DEFICIENCY)      5. Hyperlipidemia LDL goal <100  Comp Metabolic Panel    Lipid Profile    LP w/improved but still high; HDL chronic low but improved; LDL high. not on statin. enc pt to improve healthy diet & regular exercise. .      6. Vitamin D deficiency  VITAMIN D,25 HYDROXY (DEFICIENCY)    continue chronic ergocalciferol. judy lab 4 mo. f/u for review      7. B12 deficiency      stable on otc supplement            Followup: Return in about 4 months (around 6/21/2024), or for lab review.

## 2024-05-13 DIAGNOSIS — E11.51 DM (DIABETES MELLITUS) TYPE II, CONTROLLED, WITH PERIPHERAL VASCULAR DISORDER (HCC): ICD-10-CM

## 2024-05-29 ENCOUNTER — OFFICE VISIT (OUTPATIENT)
Dept: MEDICAL GROUP | Facility: MEDICAL CENTER | Age: 59
End: 2024-05-29
Payer: COMMERCIAL

## 2024-05-29 VITALS
HEIGHT: 64 IN | WEIGHT: 284.2 LBS | HEART RATE: 82 BPM | OXYGEN SATURATION: 93 % | SYSTOLIC BLOOD PRESSURE: 128 MMHG | TEMPERATURE: 97.3 F | DIASTOLIC BLOOD PRESSURE: 86 MMHG | BODY MASS INDEX: 48.52 KG/M2

## 2024-05-29 DIAGNOSIS — R94.6 ABNORMAL THYROID FUNCTION TEST: ICD-10-CM

## 2024-05-29 DIAGNOSIS — Z12.31 ENCOUNTER FOR SCREENING MAMMOGRAM FOR MALIGNANT NEOPLASM OF BREAST: ICD-10-CM

## 2024-05-29 DIAGNOSIS — E55.9 VITAMIN D DEFICIENCY: ICD-10-CM

## 2024-05-29 DIAGNOSIS — E78.5 HYPERLIPIDEMIA LDL GOAL <100: ICD-10-CM

## 2024-05-29 DIAGNOSIS — Z23 NEED FOR PNEUMOCOCCAL 20-VALENT CONJUGATE VACCINATION: ICD-10-CM

## 2024-05-29 DIAGNOSIS — E03.9 ACQUIRED HYPOTHYROIDISM: ICD-10-CM

## 2024-05-29 DIAGNOSIS — E11.51 DM (DIABETES MELLITUS) TYPE II, CONTROLLED, WITH PERIPHERAL VASCULAR DISORDER (HCC): ICD-10-CM

## 2024-05-29 RX ORDER — ATORVASTATIN CALCIUM 20 MG/1
20 TABLET, FILM COATED ORAL NIGHTLY
Qty: 90 TABLET | Refills: 0 | Status: SHIPPED | OUTPATIENT
Start: 2024-05-29

## 2024-05-29 RX ORDER — LEVOTHYROXINE SODIUM 0.12 MG/1
TABLET ORAL
Qty: 105 TABLET | Refills: 1 | Status: SHIPPED | OUTPATIENT
Start: 2024-05-29

## 2024-05-29 ASSESSMENT — FIBROSIS 4 INDEX: FIB4 SCORE: 0.5

## 2024-05-30 NOTE — PROGRESS NOTES
Subjective:     Toyin Gallagher is a 58 y.o. female who presents with DM.    HPI:     Seen in f/u for DM.    Problem #1:DM  She is here to review her labs.    She is stable on metformin for her DM.  Taking med approp.  Needs med refilled.     Problem #2:preventative health  She is due updated mammo and prevnar 20.        Lab results reviewed with pt:  we don't have results but pt looked up on her phone.  A1c is sl up from 6.8 to 7.0. she is on 500 mg metformin bid.  Ronny well.  She is on healthy diet and exercising regularly.  Walking every weekday before work.    TSH is high 5.33 but T4 is wnl.  She is on 125 mcg take 1 tab 5x/wk and 1.5 tabs 2x/wk.      Vitamin d is ok at 35.  She is on ergocalciferol weekly.  Taking approp.    LP shows trg improving.  Down from 309, 270 and 170.  Current at 157  she is stable on tricor.  HDL is up to 44.  LDL is 121.  Goal is <100.  She is on lipitor 10 mg daily.    CMP, GFR, alb/cr ratio is wnl.      Patient Active Problem List    Diagnosis Date Noted    DM (diabetes mellitus) type II, controlled, with peripheral vascular disorder (HCC) 10/11/2023    Morbid obesity with BMI of 45.0-49.9, adult (HCC) 09/30/2020    Pseudoangiomatous stromal hyperplasia of breast 03/28/2018    Chronic cholecystitis 01/13/2017    Hyperlipidemia LDL goal <130     Vitamin D deficiency     Hypothyroidism     Hypertension        Current medicines (including changes today)  Current Outpatient Medications   Medication Sig Dispense Refill    atorvastatin (LIPITOR) 20 MG Tab Take 1 Tablet by mouth every evening. 90 Tablet 0    metformin (GLUCOPHAGE) 1000 MG tablet Take 1 Tablet by mouth 2 times a day with meals. 180 Tablet 0    levothyroxine (SYNTHROID) 125 MCG Tab TAKE ONE TABLET BY MOUTH 4 TIMES A WEEK AND TAKE ONE AND ONE HALF TABLETS BY MOUTH Three TIMES A WEEK 105 Tablet 1    fenofibrate (TRICOR) 48 MG Tab Take 1 Tablet by mouth every day. 90 Tablet 3    triamterene/hctz (MAXZIDE-25/DYAZIDE)  "37.5-25 MG Cap Take 1 Capsule by mouth every morning. 100 Capsule 3    vitamin D2, Ergocalciferol, (DRISDOL) 1.25 MG (79467 UT) Cap capsule Take 1 Capsule by mouth every 7 days. 12 Capsule 3    ASPIRIN 81 PO        No current facility-administered medications for this visit.       No Known Allergies    ROS  Constitutional: Negative. Negative for fever, chills, weight loss, malaise/fatigue and diaphoresis.   HENT: Negative. Negative for hearing loss, ear pain, nosebleeds, congestion, sore throat, neck pain, tinnitus and ear discharge.   Respiratory: Negative. Negative for cough, hemoptysis, sputum production, shortness of breath, wheezing and stridor.   Cardiovascular: Negative. Negative for chest pain, palpitations, orthopnea, claudication, leg swelling and PND.   Gastrointestinal: Denies nausea, vomiting, diarrhea, constipation, heartburn, melena or hematochezia.  Genitourinary: Denies dysuria, hematuria, urinary incontinence, frequency or urgency.        Objective:     /86 (BP Location: Left arm, Patient Position: Sitting, BP Cuff Size: Adult)   Pulse 82   Temp 36.3 °C (97.3 °F) (Temporal)   Ht 1.63 m (5' 4.17\")   Wt (!) 129 kg (284 lb 3.2 oz)   SpO2 93%  Body mass index is 48.52 kg/m².    Physical Exam:  Vitals reviewed.  Constitutional: Oriented to person, place, and time. appears well-developed and well-nourished. No distress.   Cardiovascular: Normal rate, regular rhythm, normal heart sounds and intact distal pulses. Exam reveals no gallop and no friction rub. No murmur heard. No carotid bruits.   Pulmonary/Chest: Effort normal and breath sounds normal. No stridor. No respiratory distress. no wheezes or rales. exhibits no tenderness.   Musculoskeletal: Normal range of motion. exhibits no edema. chasidy pedal pulses 2+.  Lymphadenopathy: No cervical or supraclavicular adenopathy.   Neurological: Alert and oriented to person, place, and time. exhibits normal muscle tone.  Skin: Skin is warm and dry. No " diaphoresis.   Psychiatric: Normal mood and affect. Behavior is normal.   Monofilament foot exam:  2+ chasidy pedal pulses.  Sensation intact chasidy LE.  No macerations or ulcerations.         Assessment and Plan:     The following treatment plan was discussed:    1. DM (diabetes mellitus) type II, controlled, with peripheral vascular disorder (HCC)  Diabetic Monofilament LE Exam    Comp Metabolic Panel    metformin (GLUCOPHAGE) 1000 MG tablet    HEMOGLOBIN A1C    a1c up sl. enc pt to improve healthy lifestyle. judy lab 3 mo. increase metformin to 1000 mg bid.  RF med      2. Hyperlipidemia LDL goal <100  atorvastatin (LIPITOR) 20 MG Tab    Comp Metabolic Panel    Lipid Profile    trg improving on tricor.  HDL improving with healthy diet & exercise.  LDL high. inc lipitor to 20 mg daily. judy lab 3 mo. f/u for review.      3. Vitamin D deficiency  VITAMIN D,25 HYDROXY (DEFICIENCY)    continue ergocalciferol. judy lab in 3 mo. f/u for review      4. Abnormal thyroid function test  TSH    FREE THYROXINE    TSH HIGh.  T4.  increase levothyroxine. f/u for review      5. Acquired hypothyroidism  levothyroxine (SYNTHROID) 125 MCG Tab    TSH    FREE THYROXINE    TSH up but T4 wnl. inc levothyroxine to 1 tab 4x/wk, 1.5 tab 3x/wk. judy lab 3 mo. fu for review      6. Encounter for screening mammogram for malignant neoplasm of breast  MA-SCREENING MAMMO BILAT W/TOMOSYNTHESIS W/CAD      7. Need for pneumococcal 20-valent conjugate vaccination  Pneumococcal Conjugate Vaccine 20-Valent (6 wks+)    prevnar 20 given today            Followup: Return in about 3 months (around 8/29/2024), or for lab review.

## 2024-07-24 DIAGNOSIS — E78.5 HYPERLIPIDEMIA LDL GOAL <100: ICD-10-CM

## 2024-07-24 DIAGNOSIS — E11.51 DM (DIABETES MELLITUS) TYPE II, CONTROLLED, WITH PERIPHERAL VASCULAR DISORDER (HCC): ICD-10-CM

## 2024-07-24 RX ORDER — ATORVASTATIN CALCIUM 20 MG/1
20 TABLET, FILM COATED ORAL NIGHTLY
Qty: 90 TABLET | Refills: 3 | Status: SHIPPED | OUTPATIENT
Start: 2024-07-24

## 2024-08-01 ENCOUNTER — HOSPITAL ENCOUNTER (OUTPATIENT)
Dept: RADIOLOGY | Facility: MEDICAL CENTER | Age: 59
End: 2024-08-01
Attending: NURSE PRACTITIONER
Payer: COMMERCIAL

## 2024-08-01 DIAGNOSIS — Z12.31 ENCOUNTER FOR SCREENING MAMMOGRAM FOR MALIGNANT NEOPLASM OF BREAST: ICD-10-CM

## 2024-08-01 PROCEDURE — 77067 SCR MAMMO BI INCL CAD: CPT

## 2024-08-06 ENCOUNTER — APPOINTMENT (OUTPATIENT)
Dept: MEDICAL GROUP | Facility: MEDICAL CENTER | Age: 59
End: 2024-08-06
Payer: COMMERCIAL

## 2024-08-09 DIAGNOSIS — E55.9 VITAMIN D DEFICIENCY: ICD-10-CM

## 2024-08-09 NOTE — TELEPHONE ENCOUNTER
Received request via: Pharmacy    Was the patient seen in the last year in this department? Yes    Does the patient have an active prescription (recently filled or refills available) for medication(s) requested? No    Pharmacy Name: [facility]     Does the patient have long-term Plus and need 100-day supply? (This applies to ALL medications) Patient does not have SCP

## 2024-08-10 RX ORDER — ERGOCALCIFEROL 1.25 MG/1
50000 CAPSULE, LIQUID FILLED ORAL
Qty: 12 CAPSULE | Refills: 0 | Status: SHIPPED | OUTPATIENT
Start: 2024-08-10

## 2024-08-22 ENCOUNTER — OFFICE VISIT (OUTPATIENT)
Dept: URGENT CARE | Facility: CLINIC | Age: 59
End: 2024-08-22
Payer: COMMERCIAL

## 2024-08-22 VITALS
TEMPERATURE: 97.6 F | HEIGHT: 64 IN | HEART RATE: 72 BPM | BODY MASS INDEX: 47.8 KG/M2 | WEIGHT: 280 LBS | DIASTOLIC BLOOD PRESSURE: 66 MMHG | OXYGEN SATURATION: 99 % | RESPIRATION RATE: 16 BRPM | SYSTOLIC BLOOD PRESSURE: 112 MMHG

## 2024-08-22 DIAGNOSIS — J06.9 VIRAL URI: ICD-10-CM

## 2024-08-22 DIAGNOSIS — J02.9 SORE THROAT: ICD-10-CM

## 2024-08-22 LAB
FLUAV RNA SPEC QL NAA+PROBE: NEGATIVE
FLUBV RNA SPEC QL NAA+PROBE: NEGATIVE
RSV RNA SPEC QL NAA+PROBE: NEGATIVE
S PYO DNA SPEC NAA+PROBE: NOT DETECTED
SARS-COV-2 RNA RESP QL NAA+PROBE: NEGATIVE

## 2024-08-22 PROCEDURE — 3074F SYST BP LT 130 MM HG: CPT | Performed by: PHYSICIAN ASSISTANT

## 2024-08-22 PROCEDURE — 3078F DIAST BP <80 MM HG: CPT | Performed by: PHYSICIAN ASSISTANT

## 2024-08-22 PROCEDURE — 99213 OFFICE O/P EST LOW 20 MIN: CPT | Performed by: PHYSICIAN ASSISTANT

## 2024-08-22 PROCEDURE — 87651 STREP A DNA AMP PROBE: CPT | Performed by: PHYSICIAN ASSISTANT

## 2024-08-22 PROCEDURE — 0241U POCT CEPHEID COV-2, FLU A/B, RSV - PCR: CPT | Performed by: PHYSICIAN ASSISTANT

## 2024-08-22 RX ORDER — GUAIFENESIN 600 MG/1
600 TABLET, EXTENDED RELEASE ORAL EVERY 12 HOURS
COMMUNITY

## 2024-08-22 ASSESSMENT — ENCOUNTER SYMPTOMS
SHORTNESS OF BREATH: 0
HEADACHES: 1
VOMITING: 0
EYE REDNESS: 0
MYALGIAS: 0
DIARRHEA: 0
NAUSEA: 0
EYE DISCHARGE: 0
TROUBLE SWALLOWING: 0
SORE THROAT: 1
COUGH: 1
FEVER: 0

## 2024-08-22 ASSESSMENT — FIBROSIS 4 INDEX: FIB4 SCORE: 0.5
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
## 2024-09-05 ENCOUNTER — APPOINTMENT (OUTPATIENT)
Dept: MEDICAL GROUP | Facility: MEDICAL CENTER | Age: 59
End: 2024-09-05
Payer: COMMERCIAL

## 2024-09-07 LAB
25(OH)D3+25(OH)D2 SERPL-MCNC: 38.4 NG/ML (ref 30–100)
ALBUMIN SERPL-MCNC: 4 G/DL (ref 3.8–4.9)
ALP SERPL-CCNC: 62 IU/L (ref 44–121)
ALT SERPL-CCNC: 14 IU/L (ref 0–32)
AST SERPL-CCNC: 15 IU/L (ref 0–40)
BILIRUB SERPL-MCNC: 0.4 MG/DL (ref 0–1.2)
BUN SERPL-MCNC: 14 MG/DL (ref 6–24)
BUN/CREAT SERPL: 16 (ref 9–23)
CALCIUM SERPL-MCNC: 9.9 MG/DL (ref 8.7–10.2)
CHLORIDE SERPL-SCNC: 101 MMOL/L (ref 96–106)
CHOLEST SERPL-MCNC: 204 MG/DL (ref 100–199)
CO2 SERPL-SCNC: 23 MMOL/L (ref 20–29)
CREAT SERPL-MCNC: 0.85 MG/DL (ref 0.57–1)
EGFRCR SERPLBLD CKD-EPI 2021: 80 ML/MIN/1.73
GLOBULIN SER CALC-MCNC: 3.1 G/DL (ref 1.5–4.5)
GLUCOSE SERPL-MCNC: 121 MG/DL (ref 70–99)
HBA1C MFR BLD: 7.1 % (ref 4.8–5.6)
HDLC SERPL-MCNC: 42 MG/DL
LDL CALC COMMENT:: ABNORMAL
LDLC SERPL CALC-MCNC: 132 MG/DL (ref 0–99)
POTASSIUM SERPL-SCNC: 4.5 MMOL/L (ref 3.5–5.2)
PROT SERPL-MCNC: 7.1 G/DL (ref 6–8.5)
SODIUM SERPL-SCNC: 139 MMOL/L (ref 134–144)
T4 FREE SERPL-MCNC: 1.79 NG/DL (ref 0.82–1.77)
TRIGL SERPL-MCNC: 168 MG/DL (ref 0–149)
TSH SERPL DL<=0.005 MIU/L-ACNC: 1.04 UIU/ML (ref 0.45–4.5)
VLDLC SERPL CALC-MCNC: 30 MG/DL (ref 5–40)

## 2024-09-10 SDOH — HEALTH STABILITY: PHYSICAL HEALTH: ON AVERAGE, HOW MANY DAYS PER WEEK DO YOU ENGAGE IN MODERATE TO STRENUOUS EXERCISE (LIKE A BRISK WALK)?: 5 DAYS

## 2024-09-10 SDOH — ECONOMIC STABILITY: FOOD INSECURITY: WITHIN THE PAST 12 MONTHS, YOU WORRIED THAT YOUR FOOD WOULD RUN OUT BEFORE YOU GOT MONEY TO BUY MORE.: NEVER TRUE

## 2024-09-10 SDOH — ECONOMIC STABILITY: FOOD INSECURITY: WITHIN THE PAST 12 MONTHS, THE FOOD YOU BOUGHT JUST DIDN'T LAST AND YOU DIDN'T HAVE MONEY TO GET MORE.: NEVER TRUE

## 2024-09-10 SDOH — ECONOMIC STABILITY: INCOME INSECURITY: IN THE LAST 12 MONTHS, WAS THERE A TIME WHEN YOU WERE NOT ABLE TO PAY THE MORTGAGE OR RENT ON TIME?: NO

## 2024-09-10 SDOH — HEALTH STABILITY: PHYSICAL HEALTH: ON AVERAGE, HOW MANY MINUTES DO YOU ENGAGE IN EXERCISE AT THIS LEVEL?: 30 MIN

## 2024-09-10 SDOH — HEALTH STABILITY: MENTAL HEALTH
STRESS IS WHEN SOMEONE FEELS TENSE, NERVOUS, ANXIOUS, OR CAN'T SLEEP AT NIGHT BECAUSE THEIR MIND IS TROUBLED. HOW STRESSED ARE YOU?: ONLY A LITTLE

## 2024-09-10 SDOH — ECONOMIC STABILITY: INCOME INSECURITY: HOW HARD IS IT FOR YOU TO PAY FOR THE VERY BASICS LIKE FOOD, HOUSING, MEDICAL CARE, AND HEATING?: NOT HARD AT ALL

## 2024-09-10 ASSESSMENT — SOCIAL DETERMINANTS OF HEALTH (SDOH)
IN A TYPICAL WEEK, HOW MANY TIMES DO YOU TALK ON THE PHONE WITH FAMILY, FRIENDS, OR NEIGHBORS?: ONCE A WEEK
WITHIN THE PAST 12 MONTHS, YOU WORRIED THAT YOUR FOOD WOULD RUN OUT BEFORE YOU GOT THE MONEY TO BUY MORE: NEVER TRUE
HOW HARD IS IT FOR YOU TO PAY FOR THE VERY BASICS LIKE FOOD, HOUSING, MEDICAL CARE, AND HEATING?: NOT HARD AT ALL
IN THE PAST 12 MONTHS, HAS THE ELECTRIC, GAS, OIL, OR WATER COMPANY THREATENED TO SHUT OFF SERVICE IN YOUR HOME?: NO
HOW OFTEN DO YOU ATTENT MEETINGS OF THE CLUB OR ORGANIZATION YOU BELONG TO?: MORE THAN 4 TIMES PER YEAR
HOW OFTEN DO YOU ATTEND CHURCH OR RELIGIOUS SERVICES?: MORE THAN 4 TIMES PER YEAR
DO YOU BELONG TO ANY CLUBS OR ORGANIZATIONS SUCH AS CHURCH GROUPS UNIONS, FRATERNAL OR ATHLETIC GROUPS, OR SCHOOL GROUPS?: YES
HOW OFTEN DO YOU HAVE A DRINK CONTAINING ALCOHOL: NEVER
HOW OFTEN DO YOU ATTENT MEETINGS OF THE CLUB OR ORGANIZATION YOU BELONG TO?: MORE THAN 4 TIMES PER YEAR
HOW OFTEN DO YOU GET TOGETHER WITH FRIENDS OR RELATIVES?: ONCE A WEEK
HOW OFTEN DO YOU GET TOGETHER WITH FRIENDS OR RELATIVES?: ONCE A WEEK
DO YOU BELONG TO ANY CLUBS OR ORGANIZATIONS SUCH AS CHURCH GROUPS UNIONS, FRATERNAL OR ATHLETIC GROUPS, OR SCHOOL GROUPS?: YES
HOW MANY DRINKS CONTAINING ALCOHOL DO YOU HAVE ON A TYPICAL DAY WHEN YOU ARE DRINKING: PATIENT DOES NOT DRINK
HOW OFTEN DO YOU ATTEND CHURCH OR RELIGIOUS SERVICES?: MORE THAN 4 TIMES PER YEAR
IN A TYPICAL WEEK, HOW MANY TIMES DO YOU TALK ON THE PHONE WITH FAMILY, FRIENDS, OR NEIGHBORS?: ONCE A WEEK
HOW OFTEN DO YOU HAVE SIX OR MORE DRINKS ON ONE OCCASION: NEVER

## 2024-09-10 ASSESSMENT — LIFESTYLE VARIABLES
AUDIT-C TOTAL SCORE: 0
HOW OFTEN DO YOU HAVE SIX OR MORE DRINKS ON ONE OCCASION: NEVER
HOW OFTEN DO YOU HAVE A DRINK CONTAINING ALCOHOL: NEVER
SKIP TO QUESTIONS 9-10: 1
HOW MANY STANDARD DRINKS CONTAINING ALCOHOL DO YOU HAVE ON A TYPICAL DAY: PATIENT DOES NOT DRINK

## 2024-09-11 ENCOUNTER — APPOINTMENT (OUTPATIENT)
Dept: MEDICAL GROUP | Facility: MEDICAL CENTER | Age: 59
End: 2024-09-11
Payer: COMMERCIAL

## 2024-09-11 VITALS
BODY MASS INDEX: 46.86 KG/M2 | HEIGHT: 64 IN | WEIGHT: 274.5 LBS | TEMPERATURE: 97 F | SYSTOLIC BLOOD PRESSURE: 120 MMHG | OXYGEN SATURATION: 96 % | HEART RATE: 77 BPM | DIASTOLIC BLOOD PRESSURE: 82 MMHG

## 2024-09-11 DIAGNOSIS — I10 HYPERTENSION, ESSENTIAL: ICD-10-CM

## 2024-09-11 DIAGNOSIS — E11.51 DM (DIABETES MELLITUS) TYPE II, CONTROLLED, WITH PERIPHERAL VASCULAR DISORDER (HCC): ICD-10-CM

## 2024-09-11 DIAGNOSIS — Z12.83 SCREENING FOR MALIGNANT NEOPLASM OF SKIN: ICD-10-CM

## 2024-09-11 DIAGNOSIS — E55.9 VITAMIN D DEFICIENCY: ICD-10-CM

## 2024-09-11 DIAGNOSIS — E78.5 HYPERLIPIDEMIA LDL GOAL <100: ICD-10-CM

## 2024-09-11 DIAGNOSIS — E03.9 ACQUIRED HYPOTHYROIDISM: ICD-10-CM

## 2024-09-11 DIAGNOSIS — Z00.00 ANNUAL PHYSICAL EXAM: ICD-10-CM

## 2024-09-11 DIAGNOSIS — L98.9 SKIN LESION OF BACK: ICD-10-CM

## 2024-09-11 PROCEDURE — 3079F DIAST BP 80-89 MM HG: CPT | Performed by: NURSE PRACTITIONER

## 2024-09-11 PROCEDURE — 99396 PREV VISIT EST AGE 40-64: CPT | Performed by: NURSE PRACTITIONER

## 2024-09-11 PROCEDURE — 3074F SYST BP LT 130 MM HG: CPT | Performed by: NURSE PRACTITIONER

## 2024-09-11 RX ORDER — LANCETS 30 GAUGE
1 EACH MISCELLANEOUS DAILY
Qty: 100 EACH | Refills: 3 | Status: SHIPPED | OUTPATIENT
Start: 2024-09-11

## 2024-09-11 RX ORDER — ERGOCALCIFEROL 1.25 MG/1
50000 CAPSULE, LIQUID FILLED ORAL
Qty: 12 CAPSULE | Refills: 3 | Status: SHIPPED | OUTPATIENT
Start: 2024-09-11

## 2024-09-11 RX ORDER — METFORMIN HYDROCHLORIDE EXTENDED-RELEASE TABLETS 1000 MG/1
1000 TABLET, FILM COATED, EXTENDED RELEASE ORAL
Qty: 30 TABLET | Refills: 3 | Status: SHIPPED | OUTPATIENT
Start: 2024-09-11

## 2024-09-11 RX ORDER — GLIMEPIRIDE 1 MG/1
1 TABLET ORAL EVERY MORNING
Qty: 30 TABLET | Refills: 3 | Status: SHIPPED | OUTPATIENT
Start: 2024-09-11

## 2024-09-11 RX ORDER — ATORVASTATIN CALCIUM 40 MG/1
40 TABLET, FILM COATED ORAL NIGHTLY
Qty: 30 TABLET | Refills: 2 | Status: SHIPPED | OUTPATIENT
Start: 2024-09-11

## 2024-09-11 RX ORDER — TRIAMTERENE AND HYDROCHLOROTHIAZIDE 37.5; 25 MG/1; MG/1
1 CAPSULE ORAL EVERY MORNING
Qty: 100 CAPSULE | Refills: 3 | Status: SHIPPED | OUTPATIENT
Start: 2024-09-11

## 2024-09-11 ASSESSMENT — FIBROSIS 4 INDEX: FIB4 SCORE: 0.53

## 2024-10-04 DIAGNOSIS — E03.9 ACQUIRED HYPOTHYROIDISM: ICD-10-CM

## 2024-10-09 RX ORDER — LEVOTHYROXINE SODIUM 125 UG/1
TABLET ORAL
Qty: 105 TABLET | Refills: 1 | Status: SHIPPED | OUTPATIENT
Start: 2024-10-09

## 2024-11-10 DIAGNOSIS — E11.51 DM (DIABETES MELLITUS) TYPE II, CONTROLLED, WITH PERIPHERAL VASCULAR DISORDER (HCC): ICD-10-CM

## 2024-11-10 RX ORDER — METFORMIN HYDROCHLORIDE EXTENDED-RELEASE TABLETS 1000 MG/1
1000 TABLET, FILM COATED, EXTENDED RELEASE ORAL
Qty: 90 TABLET | Refills: 1 | Status: SHIPPED | OUTPATIENT
Start: 2024-11-10

## 2024-11-19 DIAGNOSIS — E11.51 DM (DIABETES MELLITUS) TYPE II, CONTROLLED, WITH PERIPHERAL VASCULAR DISORDER (HCC): ICD-10-CM

## 2024-11-20 NOTE — TELEPHONE ENCOUNTER
Received request via: Pharmacy    Was the patient seen in the last year in this department? Yes    Does the patient have an active prescription (recently filled or refills available) for medication(s) requested? No    [email or web address]     Does the patient have long-term Plus and need 100-day supply? (This applies to ALL medications) Patient does not have SCP

## 2024-12-07 RX ORDER — METFORMIN HYDROCHLORIDE EXTENDED-RELEASE TABLETS 1000 MG/1
1000 TABLET, FILM COATED, EXTENDED RELEASE ORAL
Qty: 90 TABLET | Refills: 3 | Status: SHIPPED | OUTPATIENT
Start: 2024-12-07

## 2024-12-07 RX ORDER — GLIMEPIRIDE 1 MG/1
1 TABLET ORAL EVERY MORNING
Qty: 90 TABLET | Refills: 3 | Status: SHIPPED | OUTPATIENT
Start: 2024-12-07

## 2025-01-25 LAB
ALBUMIN SERPL-MCNC: 4.3 G/DL (ref 3.8–4.9)
ALBUMIN/CREAT UR: <4 MG/G CREAT (ref 0–29)
ALP SERPL-CCNC: 63 IU/L (ref 44–121)
ALT SERPL-CCNC: 15 IU/L (ref 0–32)
AST SERPL-CCNC: 16 IU/L (ref 0–40)
BILIRUB SERPL-MCNC: 0.4 MG/DL (ref 0–1.2)
BUN SERPL-MCNC: 14 MG/DL (ref 6–24)
BUN/CREAT SERPL: 18 (ref 9–23)
CALCIUM SERPL-MCNC: 9.6 MG/DL (ref 8.7–10.2)
CHLORIDE SERPL-SCNC: 99 MMOL/L (ref 96–106)
CHOLEST SERPL-MCNC: 209 MG/DL (ref 100–199)
CO2 SERPL-SCNC: 26 MMOL/L (ref 20–29)
CREAT SERPL-MCNC: 0.77 MG/DL (ref 0.57–1)
CREAT UR-MCNC: 71 MG/DL
EGFRCR SERPLBLD CKD-EPI 2021: 90 ML/MIN/1.73
GLOBULIN SER CALC-MCNC: 2.8 G/DL (ref 1.5–4.5)
GLUCOSE SERPL-MCNC: 109 MG/DL (ref 70–99)
HBA1C MFR BLD: 6.4 % (ref 4.8–5.6)
HDLC SERPL-MCNC: 46 MG/DL
LDL CALC COMMENT:: ABNORMAL
LDLC SERPL CALC-MCNC: 133 MG/DL (ref 0–99)
MICROALBUMIN UR-MCNC: <3 UG/ML
POTASSIUM SERPL-SCNC: 3.8 MMOL/L (ref 3.5–5.2)
PROT SERPL-MCNC: 7.1 G/DL (ref 6–8.5)
SODIUM SERPL-SCNC: 138 MMOL/L (ref 134–144)
TRIGL SERPL-MCNC: 169 MG/DL (ref 0–149)
VLDLC SERPL CALC-MCNC: 30 MG/DL (ref 5–40)

## 2025-01-29 ENCOUNTER — OFFICE VISIT (OUTPATIENT)
Dept: MEDICAL GROUP | Facility: MEDICAL CENTER | Age: 60
End: 2025-01-29
Payer: COMMERCIAL

## 2025-01-29 VITALS
HEART RATE: 72 BPM | DIASTOLIC BLOOD PRESSURE: 82 MMHG | OXYGEN SATURATION: 97 % | WEIGHT: 271 LBS | TEMPERATURE: 95.7 F | BODY MASS INDEX: 48.02 KG/M2 | SYSTOLIC BLOOD PRESSURE: 124 MMHG | HEIGHT: 63 IN

## 2025-01-29 DIAGNOSIS — E11.51 DM (DIABETES MELLITUS) TYPE II, CONTROLLED, WITH PERIPHERAL VASCULAR DISORDER (HCC): ICD-10-CM

## 2025-01-29 DIAGNOSIS — E78.5 HYPERLIPIDEMIA LDL GOAL <100: ICD-10-CM

## 2025-01-29 DIAGNOSIS — E78.1 HYPERTRIGLYCERIDEMIA: ICD-10-CM

## 2025-01-29 DIAGNOSIS — M79.671 RIGHT FOOT PAIN: ICD-10-CM

## 2025-01-29 RX ORDER — ATORVASTATIN CALCIUM 80 MG/1
80 TABLET, FILM COATED ORAL NIGHTLY
Qty: 90 TABLET | Refills: 0 | Status: SHIPPED | OUTPATIENT
Start: 2025-01-29 | End: 2026-03-05

## 2025-01-29 RX ORDER — METFORMIN HYDROCHLORIDE 500 MG/1
2 TABLET, FILM COATED, EXTENDED RELEASE ORAL 2 TIMES DAILY
Qty: 360 TABLET | Refills: 3 | Status: SHIPPED | OUTPATIENT
Start: 2025-01-29

## 2025-01-29 RX ORDER — FENOFIBRATE 48 MG/1
48 TABLET, COATED ORAL DAILY
Qty: 90 TABLET | Refills: 3 | Status: SHIPPED | OUTPATIENT
Start: 2025-01-29

## 2025-01-29 ASSESSMENT — PATIENT HEALTH QUESTIONNAIRE - PHQ9: CLINICAL INTERPRETATION OF PHQ2 SCORE: 0

## 2025-01-29 ASSESSMENT — FIBROSIS 4 INDEX: FIB4 SCORE: 0.56

## 2025-01-29 NOTE — PROGRESS NOTES
Subjective:     History of Present Illness  The patient is a 59-year-old female seen in follow-up for diabetes.    She reports no need for medication refills at this time. She has been unable to obtain her extended-release metformin due to availability issues with the 1000 mg dosage, as the pharmacy only stocks the 500 mg tablets.     She continues her weekly vitamin D supplementation.     She maintains a healthy diet and engages in regular exercise, including treadmill walking. However, she has had to discontinue this activity due to tenderness and swelling on the lateral sole of her right foot. Despite using supportive footwear, the discomfort persists. She reports no history of injury to the foot.     She also reports no chest pain, shortness of breath, or wheezing, but mentions a sore throat and head cold. She has noticed morning congestion over the past two days, which resolves upon exposure to different air.    She has been adhering to her Lipitor regimen without experiencing any leg aches. She is not sure if she was on Lipitor when her cholesterol was 90 several years ago. She has been on Lipitor since it was increased. She inquired about the alternatives and downside of the higher dose of Lipitor d/t LDL not at goal. She is agreeable to increase the dose of Lipitor to 80 mg.      MEDICATIONS  Current: Metformin ER, vitamin D, triamterene hydrochlorothiazide, TriCor, Lipitor, Amaryl, levothyroxine.    IMMUNIZATIONS  She received her influenza and COVID-19 vaccines in November. She has received her hepatitis B, influenza, shingles, COVID-19, and pneumococcal vaccines.  She has not yet scheduled her yearlyeye exam.     Results  - Laboratory Studies:    - A1c: 6.4.  stable on metformin ER    - CMP: wnl    - GFR: 90 wnl    - LP:  trg improved but still mildly elevated, HDL at goal.  LDL high persistently at 133. LDL goal <100. Taking lipitor daily  -    Triglycerides: decreased from 309 to 169    - Albumin  creatinine ratio: less than 4    Current medicines (including changes today)  Current Outpatient Medications   Medication Sig Dispense Refill    metformin ER modified (GLUMETZA) 500 MG TABLET SR 24 HR Take 2 Tablets by mouth 2 times a day. 360 Tablet 3    fenofibrate (TRICOR) 48 MG Tab Take 1 Tablet by mouth every day. 90 Tablet 3    atorvastatin (LIPITOR) 80 MG tablet Take 1 Tablet by mouth every evening. 90 Tablet 0    glimepiride (AMARYL) 1 MG tablet Take 1 Tablet by mouth every morning. 90 Tablet 3    levothyroxine (SYNTHROID) 125 MCG Tab TAKE ONE TABLET BY MOUTH 4 TIMES A WEEK AND TAKE ONE AND ONE HALF TABLETS BY MOUTH Three TIMES A WEEK 105 Tablet 1    triamterene/hctz (MAXZIDE-25/DYAZIDE) 37.5-25 MG Cap Take 1 Capsule by mouth every morning. 100 Capsule 3    vitamin D2, Ergocalciferol, (DRISDOL) 1.25 MG (72850 UT) Cap capsule Take 1 Capsule by mouth every 7 days. 12 Capsule 3    atorvastatin (LIPITOR) 40 MG Tab Take 1 Tablet by mouth every evening. 30 Tablet 2    Blood Glucose Test Strips 1 Device every day. Test strips of insurance preference.  DX:  E11.51 100 Strip 3    Lancets 1 Device every day. Lancets  of insurance preference.  Dx:  E11.51 100 Each 3    Blood Glucose Monitoring Suppl Device 1 Each every day. Meter: Dispense Device of Insurance Preference. Dx: E11.51 1 Each 0    ASPIRIN 81 PO        No current facility-administered medications for this visit.     Current Outpatient Medications   Medication Sig Dispense Refill    metformin ER modified (GLUMETZA) 500 MG TABLET SR 24 HR Take 2 Tablets by mouth 2 times a day. 360 Tablet 3    fenofibrate (TRICOR) 48 MG Tab Take 1 Tablet by mouth every day. 90 Tablet 3    atorvastatin (LIPITOR) 80 MG tablet Take 1 Tablet by mouth every evening. 90 Tablet 0    glimepiride (AMARYL) 1 MG tablet Take 1 Tablet by mouth every morning. 90 Tablet 3    levothyroxine (SYNTHROID) 125 MCG Tab TAKE ONE TABLET BY MOUTH 4 TIMES A WEEK AND TAKE ONE AND ONE HALF TABLETS BY  MOUTH Three TIMES A WEEK 105 Tablet 1    triamterene/hctz (MAXZIDE-25/DYAZIDE) 37.5-25 MG Cap Take 1 Capsule by mouth every morning. 100 Capsule 3    vitamin D2, Ergocalciferol, (DRISDOL) 1.25 MG (00926 UT) Cap capsule Take 1 Capsule by mouth every 7 days. 12 Capsule 3    atorvastatin (LIPITOR) 40 MG Tab Take 1 Tablet by mouth every evening. 30 Tablet 2    Blood Glucose Test Strips 1 Device every day. Test strips of insurance preference.  DX:  E11.51 100 Strip 3    Lancets 1 Device every day. Lancets  of insurance preference.  Dx:  E11.51 100 Each 3    Blood Glucose Monitoring Suppl Device 1 Each every day. Meter: Dispense Device of Insurance Preference. Dx: E11.51 1 Each 0    ASPIRIN 81 PO        No current facility-administered medications for this visit.       She  has a past medical history of DM (diabetes mellitus) type II, controlled, with peripheral vascular disorder (HCC) (10/11/2023), Hyperlipidemia LDL goal <130, Hypertension, Hypothyroidism, Obesity (BMI 30-39.9), and Vitamin D deficiency.    Hemoglobin A1c:  Lab Results   Component Value Date/Time    HBA1C 6.4 (H) 01/24/2025 04:03 AM    HBA1C 7.1 (H) 09/06/2024 04:53 AM    HBA1C 6.8 (A) 02/21/2024 08:33 AM    HBA1C 6.8 (H) 08/31/2023 04:16 AM    HBA1C 5.7 (H) 04/02/2016 11:51 AM    HBA1C 5.7 (H) 01/14/2014 09:20 AM       Lipid Panel:  Lab Results   Component Value Date/Time    CHOLSTRLTOT 209 (H) 01/24/2025 0403    TRIGLYCERIDE 169 (H) 01/24/2025 0403     (H) 11/14/2022 0631           Complete Metabolic Panel:  Lab Results   Component Value Date/Time    SODIUM 138 01/24/2025 04:03 AM    SODIUM 136 05/21/2021 06:59 AM    POTASSIUM 3.8 01/24/2025 04:03 AM    POTASSIUM 3.7 05/21/2021 06:59 AM    CHLORIDE 99 01/24/2025 04:03 AM    CHLORIDE 99 05/21/2021 06:59 AM    CO2 26 01/24/2025 04:03 AM    CO2 30 05/21/2021 06:59 AM    ANION 7.0 05/21/2021 06:59 AM    GLUCOSE 109 (H) 01/24/2025 04:03 AM    GLUCOSE 99 05/21/2021 06:59 AM    BUN 14 01/24/2025 04:03  "AM    BUN 10 05/21/2021 06:59 AM    CREATININE 0.77 01/24/2025 04:03 AM    CREATININE 0.73 05/21/2021 06:59 AM    CREATININE 0.9 04/20/2006 12:15 PM    ASTSGOT 16 01/24/2025 04:03 AM    ASTSGOT 16 05/21/2021 06:59 AM    ALTSGPT 15 01/24/2025 04:03 AM    ALTSGPT 15 05/21/2021 06:59 AM    TBILIRUBIN 0.4 01/24/2025 04:03 AM    TBILIRUBIN 0.5 05/21/2021 06:59 AM    ALBUMIN 4.3 01/24/2025 04:03 AM    ALBUMIN 3.8 05/21/2021 06:59 AM    TOTPROTEIN 7.1 01/24/2025 04:03 AM    TOTPROTEIN 7.1 05/21/2021 06:59 AM    GLOBULIN 2.8 01/24/2025 04:03 AM    GLOBULIN 3.3 05/21/2021 06:59 AM    AGRATIO 1.7 02/09/2024 04:25 AM    AGRATIO 1.2 05/21/2021 06:59 AM         ROS   Review of Systems   Constitutional: Negative.  Negative for fever, chills, weight loss, malaise/fatigue and diaphoresis.   HENT: Negative.  Negative for hearing loss, ear pain, nosebleeds, congestion, sore throat, neck pain, tinnitus and ear discharge.    Respiratory: Negative.  Negative for cough, hemoptysis, sputum production, shortness of breath, wheezing and stridor.    Cardiovascular: Negative.  Negative for chest pain, palpitations, orthopnea, claudication, leg swelling and PND.   Gastrointestinal: denies nausea, vomiting, diarrhea, constipation, heartburn, melena or hematochezia.  Genitourinary: Denies dysuria, hematuria, urinary incontinence, frequency or urgency.    Musculoskeletal: Negative.  Negative for myalgias and back pain.   Neurological: Negative.  Negative for dizziness, tingling, tremors, weakness and headaches.   Psych:  Denies depression, anxiety or insomnia.  All other systems reviewed and are negative.       Objective:     /82   Pulse 72   Temp (!) 35.4 °C (95.7 °F) (Temporal)   Ht 1.6 m (5' 3\")   Wt 123 kg (271 lb)   SpO2 97%  Body mass index is 48.01 kg/m².   Physical Exam  Lungs were auscultated.    Vital Signs  Blood pressure is normal. Heart rate is normal. Temperature is low. Oxygen saturation is at 97 percent.  Physical Exam "   Vitals reviewed.  Constitutional: oriented to person, place, and time. appears well-developed and well-nourished. No distress.   Neck: No JVD present.  Cardiovascular: Normal rate, regular rhythm, normal heart sounds and intact distal pulses.  Exam reveals no gallop and no friction rub.  No murmur heard.  No carotid bruits.   Pulmonary/Chest: Effort normal and breath sounds normal. No stridor. No respiratory distress. no wheezes or rales. exhibits no tenderness.   Musculoskeletal: Normal range of motion. exhibits no edema. chasidy pedal pulses 2+.  Lymphadenopathy: no cervical or supraclavicular adenopathy.   Neurological: alert and oriented to person, place, and time. exhibits normal muscle tone. Coordination normal.   Skin: Skin is warm and dry. no diaphoresis.   Psychiatric: normal mood and affect. behavior is normal.       Assessment and Plan:   The following treatment plan was discussed    Assessment & Plan  1. Diabetes Mellitus.  Her A1c has improved to 6.4 from 7.1, indicating better control. Blood glucose level is 109. Kidney function is normal with a GFR of 90, and there is no protein in the urine. A prescription for metformin  mg, to be taken as two tablets twice daily, will be sent to [facility]. She is advised to continue her current regimen of Amaryl and levothyroxine.    2. Hypertriglyceridemia.  Triglyceride levels have decreased from 309 to 169, showing improvement. She is advised to continue regular exercise and maintain a healthy diet. The dosage of Lipitor will be increased to 80 mg. Laboratory tests, including a chemistry panel and lipid panel, will be conducted in 2 months. She will be informed of the results via [facility]. If the results are suboptimal, a switch to Crestor will be considered. If switched to Crestor, then lab work will be repeated in 2 months to make sure it is effective. If that does not work, she will be referred to the pharmacy clinic for Repatha.    3. Right Foot  Pain.  The pain is located on the lateral sole of the right foot, which is affecting her ability to exercise. An x-ray of the right foot will be ordered, and a referral to podiatry will be made for further evaluation.    4. Health Maintenance.  She received her influenza and COVID-19 vaccines in November. She is due for a mammogram in August and a bone density test in July. She is advised to schedule an eye exam.    Follow-up  The patient will follow up in 4 to 5 months.      ORDERS:  1. Hypertriglyceridemia    - fenofibrate (TRICOR) 48 MG Tab; Take 1 Tablet by mouth every day.  Dispense: 90 Tablet; Refill: 3  - atorvastatin (LIPITOR) 80 MG tablet; Take 1 Tablet by mouth every evening.  Dispense: 90 Tablet; Refill: 0  - Comp Metabolic Panel; Future  - Lipid Profile; Future    2. Hyperlipidemia LDL goal <100    - fenofibrate (TRICOR) 48 MG Tab; Take 1 Tablet by mouth every day.  Dispense: 90 Tablet; Refill: 3  - atorvastatin (LIPITOR) 80 MG tablet; Take 1 Tablet by mouth every evening.  Dispense: 90 Tablet; Refill: 0  - Comp Metabolic Panel; Future  - Lipid Profile; Future    3. DM (diabetes mellitus) type II, controlled, with peripheral vascular disorder (HCC)    - metformin ER modified (GLUMETZA) 500 MG TABLET SR 24 HR; Take 2 Tablets by mouth 2 times a day.  Dispense: 360 Tablet; Refill: 3    4. Right foot pain    - DX-FOOT-COMPLETE 3+ RIGHT; Future  - Referral to Podiatry        Please note that this dictation was created using voice recognition software. I have made every reasonable attempt to correct obvious errors, but I expect that there are errors of grammar and possibly content that I did not discover before finalizing the note.      Attestation      Verbal consent was acquired by the patient to use [employer] ambient listening note generation during this visit Yes

## 2025-02-06 DIAGNOSIS — E03.9 ACQUIRED HYPOTHYROIDISM: ICD-10-CM

## 2025-02-07 ENCOUNTER — APPOINTMENT (OUTPATIENT)
Dept: RADIOLOGY | Facility: MEDICAL CENTER | Age: 60
End: 2025-02-07
Attending: NURSE PRACTITIONER
Payer: COMMERCIAL

## 2025-02-07 DIAGNOSIS — M79.671 RIGHT FOOT PAIN: ICD-10-CM

## 2025-02-07 PROCEDURE — 73630 X-RAY EXAM OF FOOT: CPT | Mod: RT

## 2025-02-07 RX ORDER — LEVOTHYROXINE SODIUM 125 UG/1
TABLET ORAL
Qty: 105 TABLET | Refills: 1 | Status: SHIPPED | OUTPATIENT
Start: 2025-02-07

## 2025-02-07 NOTE — TELEPHONE ENCOUNTER
Received request via: Pharmacy    Was the patient seen in the last year in this department? Yes    Does the patient have an active prescription (recently filled or refills available) for medication(s) requested? No    [email or web address]     Does the patient have custodial Plus and need 100-day supply? (This applies to ALL medications) Patient does not have SCP

## 2025-03-26 DIAGNOSIS — E78.5 HYPERLIPIDEMIA LDL GOAL <100: ICD-10-CM

## 2025-03-26 DIAGNOSIS — E78.1 HYPERTRIGLYCERIDEMIA: ICD-10-CM

## 2025-03-26 NOTE — TELEPHONE ENCOUNTER
Received request via: Pharmacy    Was the patient seen in the last year in this department? Yes    Does the patient have an active prescription (recently filled or refills available) for medication(s) requested?  NO     [email or web address]     Does the patient have senior living Plus and need 100-day supply? (This applies to ALL medications) Patient does not have SCP

## 2025-03-29 RX ORDER — ATORVASTATIN CALCIUM 80 MG/1
80 TABLET, FILM COATED ORAL NIGHTLY
Qty: 90 TABLET | Refills: 3 | Status: SHIPPED | OUTPATIENT
Start: 2025-03-29 | End: 2026-05-03

## 2025-04-05 ENCOUNTER — RESULTS FOLLOW-UP (OUTPATIENT)
Dept: MEDICAL GROUP | Facility: MEDICAL CENTER | Age: 60
End: 2025-04-05

## 2025-04-05 LAB
ALBUMIN SERPL-MCNC: 4.1 G/DL (ref 3.8–4.9)
ALP SERPL-CCNC: 64 IU/L (ref 44–121)
ALT SERPL-CCNC: 18 IU/L (ref 0–32)
AST SERPL-CCNC: 13 IU/L (ref 0–40)
BILIRUB SERPL-MCNC: 0.4 MG/DL (ref 0–1.2)
BUN SERPL-MCNC: 18 MG/DL (ref 6–24)
BUN/CREAT SERPL: 22 (ref 9–23)
CALCIUM SERPL-MCNC: 9.5 MG/DL (ref 8.7–10.2)
CHLORIDE SERPL-SCNC: 101 MMOL/L (ref 96–106)
CHOLEST SERPL-MCNC: 205 MG/DL (ref 100–199)
CO2 SERPL-SCNC: 25 MMOL/L (ref 20–29)
CREAT SERPL-MCNC: 0.83 MG/DL (ref 0.57–1)
EGFRCR SERPLBLD CKD-EPI 2021: 82 ML/MIN/1.73
GLOBULIN SER CALC-MCNC: 2.7 G/DL (ref 1.5–4.5)
GLUCOSE SERPL-MCNC: 114 MG/DL (ref 70–99)
HDLC SERPL-MCNC: 46 MG/DL
LDL CALC COMMENT:: ABNORMAL
LDLC SERPL CALC-MCNC: 135 MG/DL (ref 0–99)
POTASSIUM SERPL-SCNC: 4.3 MMOL/L (ref 3.5–5.2)
PROT SERPL-MCNC: 6.8 G/DL (ref 6–8.5)
SODIUM SERPL-SCNC: 139 MMOL/L (ref 134–144)
TRIGL SERPL-MCNC: 133 MG/DL (ref 0–149)
VLDLC SERPL CALC-MCNC: 24 MG/DL (ref 5–40)

## 2025-04-15 ENCOUNTER — APPOINTMENT (OUTPATIENT)
Dept: MEDICAL GROUP | Facility: MEDICAL CENTER | Age: 60
End: 2025-04-15
Payer: COMMERCIAL

## 2025-04-15 VITALS
WEIGHT: 277.78 LBS | HEART RATE: 71 BPM | TEMPERATURE: 97 F | HEIGHT: 63 IN | OXYGEN SATURATION: 96 % | BODY MASS INDEX: 49.22 KG/M2 | DIASTOLIC BLOOD PRESSURE: 62 MMHG | SYSTOLIC BLOOD PRESSURE: 118 MMHG

## 2025-04-15 DIAGNOSIS — E78.5 HYPERLIPIDEMIA LDL GOAL <100: ICD-10-CM

## 2025-04-15 PROCEDURE — 3074F SYST BP LT 130 MM HG: CPT | Performed by: NURSE PRACTITIONER

## 2025-04-15 PROCEDURE — 99214 OFFICE O/P EST MOD 30 MIN: CPT | Performed by: NURSE PRACTITIONER

## 2025-04-15 PROCEDURE — 3078F DIAST BP <80 MM HG: CPT | Performed by: NURSE PRACTITIONER

## 2025-04-15 RX ORDER — ROSUVASTATIN CALCIUM 20 MG/1
20 TABLET, COATED ORAL EVERY EVENING
Qty: 100 TABLET | Refills: 3 | Status: SHIPPED | OUTPATIENT
Start: 2025-04-15 | End: 2026-05-20

## 2025-04-15 NOTE — PROGRESS NOTES
Subjective:     History of Present Illness  The patient is a 59-year-old female who presents for follow-up of hyperlipidemia.     She is seen in follow-up after her atorvastatin was increased from 40 mg to 80 mg at the end of January. She is tolerating the medication well. She has improved her low fat and carbohydrate diet. She has also increased her activities. She did more walking several weeks ago while in [place] and noted some side stitches. Other than that, she tolerated exercise well. No side effects to the 80 mg of Lipitor.    Results  CMP, GFR is wnl.  LP shows trg down from 169 to 133, HDL at goal, LDL still high, unchanged at 135.  She has been improving her healthy diet and doing more exercise.  Taking lipitor 80 mg approp.      Current medicines (including changes today)  Current Outpatient Medications   Medication Sig Dispense Refill    rosuvastatin (CRESTOR) 20 MG Tab Take 1 Tablet by mouth every evening. 100 Tablet 3    atorvastatin (LIPITOR) 80 MG tablet Take 1 Tablet by mouth every evening. 90 Tablet 3    levothyroxine (SYNTHROID) 125 MCG Tab TAKE ONE TABLET BY MOUTH 4 TIMES A WEEK AND TAKE ONE AND ONE HALF TABLETS BY MOUTH Three TIMES A WEEK 105 Tablet 1    metformin ER modified (GLUMETZA) 500 MG TABLET SR 24 HR Take 2 Tablets by mouth 2 times a day. 360 Tablet 3    fenofibrate (TRICOR) 48 MG Tab Take 1 Tablet by mouth every day. 90 Tablet 3    glimepiride (AMARYL) 1 MG tablet Take 1 Tablet by mouth every morning. 90 Tablet 3    triamterene/hctz (MAXZIDE-25/DYAZIDE) 37.5-25 MG Cap Take 1 Capsule by mouth every morning. 100 Capsule 3    vitamin D2, Ergocalciferol, (DRISDOL) 1.25 MG (40493 UT) Cap capsule Take 1 Capsule by mouth every 7 days. 12 Capsule 3    Blood Glucose Test Strips 1 Device every day. Test strips of insurance preference.  DX:  E11.51 100 Strip 3    Lancets 1 Device every day. Lancets  of insurance preference.  Dx:  E11.51 100 Each 3    Blood Glucose Monitoring Suppl Device 1 Each  every day. Meter: Dispense Device of Insurance Preference. Dx: E11.51 1 Each 0    ASPIRIN 81 PO        No current facility-administered medications for this visit.     Current Outpatient Medications   Medication Sig Dispense Refill    rosuvastatin (CRESTOR) 20 MG Tab Take 1 Tablet by mouth every evening. 100 Tablet 3    atorvastatin (LIPITOR) 80 MG tablet Take 1 Tablet by mouth every evening. 90 Tablet 3    levothyroxine (SYNTHROID) 125 MCG Tab TAKE ONE TABLET BY MOUTH 4 TIMES A WEEK AND TAKE ONE AND ONE HALF TABLETS BY MOUTH Three TIMES A WEEK 105 Tablet 1    metformin ER modified (GLUMETZA) 500 MG TABLET SR 24 HR Take 2 Tablets by mouth 2 times a day. 360 Tablet 3    fenofibrate (TRICOR) 48 MG Tab Take 1 Tablet by mouth every day. 90 Tablet 3    glimepiride (AMARYL) 1 MG tablet Take 1 Tablet by mouth every morning. 90 Tablet 3    triamterene/hctz (MAXZIDE-25/DYAZIDE) 37.5-25 MG Cap Take 1 Capsule by mouth every morning. 100 Capsule 3    vitamin D2, Ergocalciferol, (DRISDOL) 1.25 MG (24569 UT) Cap capsule Take 1 Capsule by mouth every 7 days. 12 Capsule 3    Blood Glucose Test Strips 1 Device every day. Test strips of insurance preference.  DX:  E11.51 100 Strip 3    Lancets 1 Device every day. Lancets  of insurance preference.  Dx:  E11.51 100 Each 3    Blood Glucose Monitoring Suppl Device 1 Each every day. Meter: Dispense Device of Insurance Preference. Dx: E11.51 1 Each 0    ASPIRIN 81 PO        No current facility-administered medications for this visit.       She  has a past medical history of DM (diabetes mellitus) type II, controlled, with peripheral vascular disorder (HCC) (10/11/2023), Hyperlipidemia LDL goal <130, Hypertension, Hypothyroidism, Obesity (BMI 30-39.9), and Vitamin D deficiency.      Lipid Panel:  Lab Results   Component Value Date/Time    CHOLSTRLTOT 205 (H) 04/04/2025 0413    TRIGLYCERIDE 133 04/04/2025 0413     (H) 11/14/2022 0631         Complete Metabolic Panel:  Lab Results    Component Value Date/Time    SODIUM 139 04/04/2025 04:13 AM    SODIUM 136 05/21/2021 06:59 AM    POTASSIUM 4.3 04/04/2025 04:13 AM    POTASSIUM 3.7 05/21/2021 06:59 AM    CHLORIDE 101 04/04/2025 04:13 AM    CHLORIDE 99 05/21/2021 06:59 AM    CO2 25 04/04/2025 04:13 AM    CO2 30 05/21/2021 06:59 AM    ANION 7.0 05/21/2021 06:59 AM    GLUCOSE 114 (H) 04/04/2025 04:13 AM    GLUCOSE 99 05/21/2021 06:59 AM    BUN 18 04/04/2025 04:13 AM    BUN 10 05/21/2021 06:59 AM    CREATININE 0.83 04/04/2025 04:13 AM    CREATININE 0.73 05/21/2021 06:59 AM    CREATININE 0.9 04/20/2006 12:15 PM    ASTSGOT 13 04/04/2025 04:13 AM    ASTSGOT 16 05/21/2021 06:59 AM    ALTSGPT 18 04/04/2025 04:13 AM    ALTSGPT 15 05/21/2021 06:59 AM    TBILIRUBIN 0.4 04/04/2025 04:13 AM    TBILIRUBIN 0.5 05/21/2021 06:59 AM    ALBUMIN 4.1 04/04/2025 04:13 AM    ALBUMIN 3.8 05/21/2021 06:59 AM    TOTPROTEIN 6.8 04/04/2025 04:13 AM    TOTPROTEIN 7.1 05/21/2021 06:59 AM    GLOBULIN 2.7 04/04/2025 04:13 AM    GLOBULIN 3.3 05/21/2021 06:59 AM    AGRATIO 1.7 02/09/2024 04:25 AM    AGRATIO 1.2 05/21/2021 06:59 AM         ROS   Review of Systems   Constitutional: Negative.  Negative for fever, chills, weight loss, malaise/fatigue and diaphoresis. + side stitches with walking.   HENT: Negative.  Negative for hearing loss, ear pain, nosebleeds, congestion, sore throat, neck pain, tinnitus and ear discharge.    Respiratory: Negative.  Negative for cough, hemoptysis, sputum production, shortness of breath, wheezing and stridor.    Cardiovascular: Negative.  Negative for chest pain, palpitations, orthopnea, claudication, leg swelling and PND.   Gastrointestinal: denies nausea, vomiting, diarrhea, constipation, heartburn, melena or hematochezia.  Genitourinary: Denies dysuria, hematuria, urinary incontinence, frequency or urgency.    Musculoskeletal: Negative.  Negative for myalgias and back pain.   Neurological: Negative.  Negative for dizziness, tingling, tremors,  "weakness and headaches.   Psych:  Denies depression, anxiety or insomnia.  All other systems reviewed and are negative.       Objective:     /62   Pulse 71   Temp 36.1 °C (97 °F) (Temporal)   Ht 1.6 m (5' 3\")   Wt (!) 126 kg (277 lb 12.5 oz)   SpO2 96%  Body mass index is 49.21 kg/m².   Physical Exam    Physical Exam   Vitals reviewed.  Constitutional: oriented to person, place, and time. appears well-developed and well-nourished. No distress.   Neck: No JVD present.  Cardiovascular: Normal rate, regular rhythm, normal heart sounds and intact distal pulses.  Exam reveals no gallop and no friction rub.  No murmur heard.  No carotid bruits.   Pulmonary/Chest: Effort normal and breath sounds normal. No stridor. No respiratory distress. no wheezes or rales. exhibits no tenderness.   Musculoskeletal: Normal range of motion. exhibits no edema. chasidy pedal pulses 2+.  Lymphadenopathy: no cervical or supraclavicular adenopathy.   Neurological: alert and oriented to person, place, and time. exhibits normal muscle tone. Coordination normal.   Skin: Skin is warm and dry. no diaphoresis.   Psychiatric: normal mood and affect. behavior is normal.       Assessment and Plan:   The following treatment plan was discussed    Assessment & Plan  1. Hyperlipidemia.  The goal is to achieve an LDL level of less than 100. Despite the increase in atorvastatin dosage, there has been no significant change in her LDL levels as indicated by her lipid panel. The risks and benefits of atorvastatin, including potential side effects such as myalgias, rhabdomyolysis, and elevated LFTs, were thoroughly discussed. She was also encouraged to maintain a healthy diet and regular exercise regimen. The current plan is to discontinue Lipitor and initiate Crestor 20 mg once daily. She will be rechecked with CMP, lipofit, Lp(a), apo B, CT ca++ in July.  F/u for review 7/25    Follow-up: The patient will follow up in 3 months for " review.      ORDERS:  1. Hyperlipidemia LDL goal <100    - Comp Metabolic Panel; Future  - HEMOGLOBIN A1C; Future  - LipoFit by NMR; Future  - LIPO-ASSOC PHOS A2 (LP-PLA2); Future  - APOLIPOPROTEIN B; Future  - CT-CARDIAC SCORING; Future  - rosuvastatin (CRESTOR) 20 MG Tab; Take 1 Tablet by mouth every evening.  Dispense: 100 Tablet; Refill: 3        Please note that this dictation was created using voice recognition software. I have made every reasonable attempt to correct obvious errors, but I expect that there are errors of grammar and possibly content that I did not discover before finalizing the note.      Attestation      Verbal consent was acquired by the patient to use [employer] ambient listening note generation during this visit yes

## 2025-04-23 ENCOUNTER — TELEPHONE (OUTPATIENT)
Dept: MEDICAL GROUP | Facility: MEDICAL CENTER | Age: 60
End: 2025-04-23
Payer: COMMERCIAL

## 2025-04-23 NOTE — LETTER
[facility]  JEFRY Thornton  No address on file  Fax: [phone]   Authorization for Release/Disclosure of   Protected Health Information   Name: LEFTY COLEY : 1965 SSN: [id number]   Address: [address]  [place] [address] Phone:    [phone] (work)   I authorize the entity listed below to release/disclose the PHI below to:   [email or web address]/JEFRY Thornton and JEFRY Thornton   Provider or Entity Name:  [facility]    Address   City, [facility]   Phone:      Fax: [phone]     Reason for request: continuity of care   Information to be released:    [  ] LAST COLONOSCOPY,  including any PATH REPORT and follow-up  [  ] LAST FIT/COLOGUARD RESULT [  ] LAST DEXA  [  ] LAST MAMMOGRAM  [  ] LAST PAP  [  ] LAST LABS [ X ] RETINA EXAM REPORT for   [  ] IMMUNIZATION RECORDS  [  ] Release all info      [  ] Check here and initial the line next to each item to release ALL health information INCLUDING  _____ Care and treatment for drug and / or alcohol abuse  _____ HIV testing, infection status, or AIDS  _____ Genetic Testing    DATES OF SERVICE OR TIME PERIOD TO BE DISCLOSED: _____________  I understand and acknowledge that:  * This Authorization may be revoked at any time by you in writing, except if your health information has already been used or disclosed.  * Your health information that will be used or disclosed as a result of you signing this authorization could be re-disclosed by the recipient. If this occurs, your re-disclosed health information may no longer be protected by State or Federal laws.  * You may refuse to sign this Authorization. Your refusal will not affect your ability to obtain treatment.  * This Authorization becomes effective upon signing and will  on (date) __________.      If no date is indicated, this Authorization will  one (1) year from the signature date.    Name: Lefty Coley  Signature: Date:   2025     PLEASE FAX  REQUESTED RECORDS BACK TO: [phone]

## 2025-04-24 ENCOUNTER — HOSPITAL ENCOUNTER (OUTPATIENT)
Dept: RADIOLOGY | Facility: MEDICAL CENTER | Age: 60
End: 2025-04-24
Attending: NURSE PRACTITIONER
Payer: COMMERCIAL

## 2025-04-24 DIAGNOSIS — E78.5 HYPERLIPIDEMIA LDL GOAL <100: ICD-10-CM

## 2025-04-24 PROCEDURE — 4410556 CT-CARDIAC SCORING (SELF PAY ONLY)

## 2025-04-24 NOTE — TELEPHONE ENCOUNTER
----- Message from Nurse Practitioner JEFRY Thornton sent at 4/15/2025  7:28 AM PDT -----  Please call [place] [facility] and get 2024 eye exam.  We have 2022 and 2023 already

## 2025-04-27 ENCOUNTER — RESULTS FOLLOW-UP (OUTPATIENT)
Dept: MEDICAL GROUP | Facility: MEDICAL CENTER | Age: 60
End: 2025-04-27

## 2025-07-21 LAB
ALBUMIN SERPL-MCNC: 4 G/DL (ref 3.8–4.9)
ALP SERPL-CCNC: 56 IU/L (ref 44–121)
ALT SERPL-CCNC: 15 IU/L (ref 0–32)
APO B SERPL-MCNC: 74 MG/DL
AST SERPL-CCNC: 15 IU/L (ref 0–40)
BILIRUB SERPL-MCNC: 0.3 MG/DL (ref 0–1.2)
BUN SERPL-MCNC: 13 MG/DL (ref 6–24)
BUN/CREAT SERPL: 16 (ref 9–23)
CALCIUM SERPL-MCNC: 8.8 MG/DL (ref 8.7–10.2)
CHLORIDE SERPL-SCNC: 105 MMOL/L (ref 96–106)
CHOLEST SERPL-MCNC: 153 MG/DL (ref 100–199)
CO2 SERPL-SCNC: 23 MMOL/L (ref 20–29)
CREAT SERPL-MCNC: 0.83 MG/DL (ref 0.57–1)
EGFRCR SERPLBLD CKD-EPI 2021: 82 ML/MIN/1.73
GLOBULIN SER CALC-MCNC: 2.5 G/DL (ref 1.5–4.5)
GLUCOSE SERPL-MCNC: 119 MG/DL (ref 70–99)
HBA1C MFR BLD: 6.5 % (ref 4.8–5.6)
HDL SERPL-SCNC: 31.6 UMOL/L
HDLC SERPL-MCNC: 44 MG/DL
LDL SERPL QN: 20.8 NM
LDL SERPL-SCNC: 1262 NMOL/L
LDL SMALL SERPL-SCNC: 622 NMOL/L
LDLC SERPL CALC-MCNC: 84 MG/DL (ref 0–99)
LP-IR SCORE SERPL: 73
LP-PLA2 SERPL-CCNC: 95 NMOL/MIN/ML (ref 0–224)
POTASSIUM SERPL-SCNC: 4.4 MMOL/L (ref 3.5–5.2)
PROT SERPL-MCNC: 6.5 G/DL (ref 6–8.5)
SODIUM SERPL-SCNC: 142 MMOL/L (ref 134–144)
TRIGL SERPL-MCNC: 140 MG/DL (ref 0–149)

## 2025-07-22 ENCOUNTER — TELEPHONE (OUTPATIENT)
Dept: MEDICAL GROUP | Facility: MEDICAL CENTER | Age: 60
End: 2025-07-22

## 2025-07-22 ENCOUNTER — APPOINTMENT (OUTPATIENT)
Dept: MEDICAL GROUP | Facility: MEDICAL CENTER | Age: 60
End: 2025-07-22
Payer: COMMERCIAL

## 2025-07-22 VITALS
OXYGEN SATURATION: 96 % | HEART RATE: 68 BPM | HEIGHT: 63 IN | TEMPERATURE: 97 F | BODY MASS INDEX: 49.08 KG/M2 | DIASTOLIC BLOOD PRESSURE: 78 MMHG | SYSTOLIC BLOOD PRESSURE: 124 MMHG | WEIGHT: 277 LBS

## 2025-07-22 DIAGNOSIS — R93.1 AGATSTON CORONARY ARTERY CALCIUM SCORE BETWEEN 200 AND 399: Primary | ICD-10-CM

## 2025-07-22 DIAGNOSIS — E11.51 DM (DIABETES MELLITUS) TYPE II, CONTROLLED, WITH PERIPHERAL VASCULAR DISORDER (HCC): ICD-10-CM

## 2025-07-22 DIAGNOSIS — Z12.31 ENCOUNTER FOR SCREENING MAMMOGRAM FOR MALIGNANT NEOPLASM OF BREAST: ICD-10-CM

## 2025-07-22 DIAGNOSIS — N95.9 POST MENOPAUSAL PROBLEMS: ICD-10-CM

## 2025-07-22 DIAGNOSIS — E78.5 HYPERLIPIDEMIA LDL GOAL <70: ICD-10-CM

## 2025-07-22 DIAGNOSIS — E55.9 VITAMIN D DEFICIENCY: ICD-10-CM

## 2025-07-22 DIAGNOSIS — E03.9 ACQUIRED HYPOTHYROIDISM: ICD-10-CM

## 2025-07-22 PROCEDURE — 3078F DIAST BP <80 MM HG: CPT | Performed by: NURSE PRACTITIONER

## 2025-07-22 PROCEDURE — 99214 OFFICE O/P EST MOD 30 MIN: CPT | Performed by: NURSE PRACTITIONER

## 2025-07-22 PROCEDURE — 3074F SYST BP LT 130 MM HG: CPT | Performed by: NURSE PRACTITIONER

## 2025-07-22 RX ORDER — LEVOTHYROXINE SODIUM 125 UG/1
TABLET ORAL
Qty: 105 TABLET | Refills: 1 | Status: SHIPPED | OUTPATIENT
Start: 2025-07-22

## 2025-07-22 RX ORDER — ROSUVASTATIN CALCIUM 20 MG/1
30 TABLET, COATED ORAL EVERY EVENING
Qty: 150 TABLET | Refills: 1 | Status: SHIPPED | OUTPATIENT
Start: 2025-07-22

## 2025-07-22 NOTE — TELEPHONE ENCOUNTER
----- Message from Nurse Practitioner JEFRY Thornton sent at 7/22/2025  7:02 AM PDT -----  Please call [place] [facility] on double r and get recent eye exam

## 2025-07-22 NOTE — PROGRESS NOTES
Subjective:     History of Present Illness  The patient is a 59-year-old female who presents for a follow-up visit for DM and hyperlipidemia    The primary reason for this visit is to review her lab results. She has discontinued her ergocalciferol supplement, opting instead for a vitamin D supplement that also contains vitamin K. This new supplement provides 10,000 units per caplet, of which she takes six.     She recently had an eye exam at [facility], where her ophthalmologist expressed satisfaction with the retinal scans.     She plans to schedule her mammogram after her vacation. Her last bone density test was conducted in 07/2023.     She reports no chest pain or shortness of breath. She manages her knee swelling with compression socks. She reports postmenopausal urinary incontinence.    She maintains a healthy diet and regular exercise routine. She reports some dietary lapses over the past three months but is committed to making improvements.     She is currently on glimepiride and metformin for diabetes management.     She is also on fenofibrate and rosuvastatin 20 mg for cholesterol management. Stable on both meds.  Taking meds approp w/o s/e    Diet: Maintains a healthy diet    Results  - Labs:    - A1c: 6.4    - LDL cholesterol: 84    - Triglycerides: 144    - HDL cholesterol: Borderline normal at 44    - Insulin resistance: Elevated at 73    - CMP, GFR: wnl    - APO B: wnl    - Lp(a): wnl    - lipofit: high LDL small particles at 622    - Imaging:    - CT for coronary calcium score: 287      Current medicines (including changes today)  Current Medications[1]  Current Medications[2]    She  has a past medical history of DM (diabetes mellitus) type II, controlled, with peripheral vascular disorder (HCC) (10/11/2023), Hyperlipidemia LDL goal <130, Hypertension, Hypothyroidism, Obesity (BMI 30-39.9), and Vitamin D deficiency.    Hemoglobin A1c:  Lab Results   Component Value Date/Time    HBA1C 6.5 (H)  07/18/2025 04:30 AM    HBA1C 6.4 (H) 01/24/2025 04:03 AM    HBA1C 7.1 (H) 09/06/2024 04:53 AM    HBA1C 6.8 (A) 02/21/2024 08:33 AM    HBA1C 5.7 (H) 04/02/2016 11:51 AM    HBA1C 5.7 (H) 01/14/2014 09:20 AM       Lipid Panel:  Lab Results   Component Value Date/Time    CHOLSTRLTOT 205 (H) 04/04/2025 0413    TRIGLYCERIDE 133 04/04/2025 0413     (H) 11/14/2022 0631     Complete Metabolic Panel:  Lab Results   Component Value Date/Time    SODIUM 142 07/18/2025 04:30 AM    SODIUM 136 05/21/2021 06:59 AM    POTASSIUM 4.4 07/18/2025 04:30 AM    POTASSIUM 3.7 05/21/2021 06:59 AM    CHLORIDE 105 07/18/2025 04:30 AM    CHLORIDE 99 05/21/2021 06:59 AM    CO2 23 07/18/2025 04:30 AM    CO2 30 05/21/2021 06:59 AM    ANION 7.0 05/21/2021 06:59 AM    GLUCOSE 119 (H) 07/18/2025 04:30 AM    GLUCOSE 99 05/21/2021 06:59 AM    BUN 13 07/18/2025 04:30 AM    BUN 10 05/21/2021 06:59 AM    CREATININE 0.83 07/18/2025 04:30 AM    CREATININE 0.73 05/21/2021 06:59 AM    CREATININE 0.9 04/20/2006 12:15 PM    ASTSGOT 15 07/18/2025 04:30 AM    ASTSGOT 16 05/21/2021 06:59 AM    ALTSGPT 15 07/18/2025 04:30 AM    ALTSGPT 15 05/21/2021 06:59 AM    TBILIRUBIN 0.3 07/18/2025 04:30 AM    TBILIRUBIN 0.5 05/21/2021 06:59 AM    ALBUMIN 4.0 07/18/2025 04:30 AM    ALBUMIN 3.8 05/21/2021 06:59 AM    TOTPROTEIN 6.5 07/18/2025 04:30 AM    TOTPROTEIN 7.1 05/21/2021 06:59 AM    GLOBULIN 2.5 07/18/2025 04:30 AM    GLOBULIN 3.3 05/21/2021 06:59 AM    AGRATIO 1.7 02/09/2024 04:25 AM    AGRATIO 1.2 05/21/2021 06:59 AM         ROS   Review of Systems   Constitutional: Negative.  Negative for fever, chills, weight loss, malaise/fatigue and diaphoresis.   HENT: Negative.  Negative for hearing loss, ear pain, nosebleeds, congestion, sore throat, neck pain, tinnitus and ear discharge.    Respiratory: Negative.  Negative for cough, hemoptysis, sputum production, shortness of breath, wheezing and stridor.    Cardiovascular: Negative.  Negative for chest pain,  "palpitations, orthopnea, claudication, leg swelling and PND.   Gastrointestinal: denies nausea, vomiting, diarrhea, constipation, heartburn, melena or hematochezia.  Genitourinary: Denies dysuria, hematuria, urinary incontinence, frequency or urgency.    Musculoskeletal: Negative.  Negative for myalgias and back pain.   Neurological: Negative.  Negative for dizziness, tingling, tremors, weakness and headaches.   Psych:  Denies depression, anxiety or insomnia.  All other systems reviewed and are negative.       Objective:     /78   Pulse 68   Temp 36.1 °C (97 °F) (Temporal)   Ht 1.6 m (5' 3\")   Wt (!) 126 kg (277 lb)   SpO2 96%  Body mass index is 49.07 kg/m².   Physical Exam  Respiratory: Clear to auscultation, no wheezing, rales or rhonchi  Cardiovascular: Regular rate and rhythm, no murmurs, rubs, or gallops  Gastrointestinal: Soft, no tenderness, no distention, no masses  Extremities: Feet normal, good pulses  Physical Exam   Vitals reviewed.  Constitutional: oriented to person, place, and time. appears well-developed and well-nourished. No distress.   Neck: No JVD present.  Cardiovascular: Normal rate, regular rhythm, normal heart sounds and intact distal pulses.  Exam reveals no gallop and no friction rub.  No murmur heard.  No carotid bruits.   Pulmonary/Chest: Effort normal and breath sounds normal. No stridor. No respiratory distress. no wheezes or rales. exhibits no tenderness.   Musculoskeletal: Normal range of motion. exhibits no edema. chasidy pedal pulses 2+.  Lymphadenopathy: no cervical or supraclavicular adenopathy.   Neurological: alert and oriented to person, place, and time. exhibits normal muscle tone. Coordination normal.   Skin: Skin is warm and dry. no diaphoresis.   Psychiatric: normal mood and affect. behavior is normal.   Monofilament foot exam:  2+ cahsidy pedal pulses.  Sensation intact chasidy LE.  No macerations or ulcerations.        Assessment and Plan:   The following treatment plan " was discussed    Assessment & Plan  1. Health maintenance  - Blood pressure, heart rate, temperature, and breathing are all within normal ranges.  - Mammogram and DEXA scan will be ordered for scheduling at her convenience.    2.  Elevated CT coronary calcium score 287  - Nuclear stress test and echocardiogram will be ordered, pending insurance approval. If not approved, focus will shift to reducing cholesterol level to <70.  - Comprehensive metabolic panel (CMP), LP will be ordered today.  - increase crestor to 30 mg daily.   - judy lab 4 months.    2. Diabetes Mellitus  - A1c level has slightly increased from 6.1 to 6.4.  - Continue current medication regimen of glimepiride and metformin.  - Encouraged to improve diet and exercise to bring A1c down to 6.4 or less. Adjustments to medication will be considered if A1c increases again.  - Kidney function and GFR are normal, indicating adequate fluid intake and effective filtration by the kidneys.    3. Hyperlipidemia  - LDL cholesterol level is currently at 84, above the target of <70.  - Triglycerides are at 144, within the normal range.  - HDL cholesterol level is borderline normal, one point away from the desired range of 45-55. Possible genetic predisposition to low HDL cholesterol levels.  - Dosage of Crestor will be increased to 30 mg.    4.  Hypothyroidism  Stable at this time on levothyroxine. Taking med approp.  No s/e t omed  Do updated tft's lab with next lab     5.  Vitamin d def  Off ergocalciferol and on otc D3 w/K2 2000 units dialy  Vitamin d  still low. Will add 2nd otc D3 supplement 3x/wk  Judy vitamin d with next la bin 4 mo.    Follow-up: She will follow up in 4 months for a physical exam and lab/test review.      ORDERS:  1. DM (diabetes mellitus) type II, controlled, with peripheral vascular disorder (HCC)    - Diabetic Monofilament LE Exam  - NM-CARDIAC STRESS TEST; Future  - EC-ECHOCARDIOGRAM COMPLETE W/O CONT; Future  - HEMOGLOBIN A1C;  Future  - MICROALBUMIN CREAT RATIO URINE; Future    2. Agatston coronary artery calcium score between 200 and 399 (Primary)    - NM-CARDIAC STRESS TEST; Future  - EC-ECHOCARDIOGRAM COMPLETE W/O CONT; Future    3. Hyperlipidemia LDL goal <70    - NM-CARDIAC STRESS TEST; Future  - EC-ECHOCARDIOGRAM COMPLETE W/O CONT; Future  - rosuvastatin (CRESTOR) 20 MG Tab; Take 1.5 Tablets by mouth every evening.  Dispense: 150 Tablet; Refill: 1  - Comp Metabolic Panel; Future  - Lipid Profile; Future    4. Acquired hypothyroidism    - levothyroxine (SYNTHROID) 125 MCG Tab; TAKE ONE TABLET BY MOUTH 4 TIMES A WEEK AND TAKE ONE AND ONE HALF TABLETS BY MOUTH Three TIMES A WEEK  Dispense: 105 Tablet; Refill: 1  - TSH; Future  - FREE THYROXINE; Future  - T3 FREE; Future    5. Vitamin D deficiency    - VITAMIN D,25 HYDROXY (DEFICIENCY); Future    6. Encounter for screening mammogram for malignant neoplasm of breast    - MA-SCREENING MAMMO BILAT W/TOMOSYNTHESIS W/CAD; Future    7. Post menopausal problems    - DS-BONE DENSITY STUDY (DEXA); Future        Please note that this dictation was created using voice recognition software. I have made every reasonable attempt to correct obvious errors, but I expect that there are errors of grammar and possibly content that I did not discover before finalizing the note.      Attestation      Verbal consent was acquired by the patient to use [employer] ambient listening note generation during this visit Yes                  [1]   Current Outpatient Medications   Medication Sig Dispense Refill    levothyroxine (SYNTHROID) 125 MCG Tab TAKE ONE TABLET BY MOUTH 4 TIMES A WEEK AND TAKE ONE AND ONE HALF TABLETS BY MOUTH Three TIMES A WEEK 105 Tablet 1    rosuvastatin (CRESTOR) 20 MG Tab Take 1.5 Tablets by mouth every evening. 150 Tablet 1    metformin ER modified (GLUMETZA) 500 MG TABLET SR 24 HR Take 2 Tablets by mouth 2 times a day. 360 Tablet 3    fenofibrate (TRICOR) 48 MG Tab Take 1 Tablet by mouth  every day. 90 Tablet 3    glimepiride (AMARYL) 1 MG tablet Take 1 Tablet by mouth every morning. 90 Tablet 3    triamterene/hctz (MAXZIDE-25/DYAZIDE) 37.5-25 MG Cap Take 1 Capsule by mouth every morning. 100 Capsule 3    Blood Glucose Test Strips 1 Device every day. Test strips of insurance preference.  DX:  E11.51 100 Strip 3    Lancets 1 Device every day. Lancets  of insurance preference.  Dx:  E11.51 100 Each 3    Blood Glucose Monitoring Suppl Device 1 Each every day. Meter: Dispense Device of Insurance Preference. Dx: E11.51 1 Each 0    ASPIRIN 81 PO        No current facility-administered medications for this visit.   [2]   Current Outpatient Medications   Medication Sig Dispense Refill    levothyroxine (SYNTHROID) 125 MCG Tab TAKE ONE TABLET BY MOUTH 4 TIMES A WEEK AND TAKE ONE AND ONE HALF TABLETS BY MOUTH Three TIMES A WEEK 105 Tablet 1    rosuvastatin (CRESTOR) 20 MG Tab Take 1.5 Tablets by mouth every evening. 150 Tablet 1    metformin ER modified (GLUMETZA) 500 MG TABLET SR 24 HR Take 2 Tablets by mouth 2 times a day. 360 Tablet 3    fenofibrate (TRICOR) 48 MG Tab Take 1 Tablet by mouth every day. 90 Tablet 3    glimepiride (AMARYL) 1 MG tablet Take 1 Tablet by mouth every morning. 90 Tablet 3    triamterene/hctz (MAXZIDE-25/DYAZIDE) 37.5-25 MG Cap Take 1 Capsule by mouth every morning. 100 Capsule 3    Blood Glucose Test Strips 1 Device every day. Test strips of insurance preference.  DX:  E11.51 100 Strip 3    Lancets 1 Device every day. Lancets  of insurance preference.  Dx:  E11.51 100 Each 3    Blood Glucose Monitoring Suppl Device 1 Each every day. Meter: Dispense Device of Insurance Preference. Dx: E11.51 1 Each 0    ASPIRIN 81 PO        No current facility-administered medications for this visit.

## 2025-07-22 NOTE — LETTER
[facility]  JEFRY Thornton  No address on file  Fax: [phone]   Authorization for Release/Disclosure of   Protected Health Information   Name: LEFTY COLEY : 1965 SSN: [id number]   Address: [address]  [place] [address] Phone:    [phone] (work)   I authorize the entity listed below to release/disclose the PHI below to:   [facility]/JEFRY Thornton and JEFRY Thornton   Provider or Entity Name:  [facility]    Address   [facility], [facility], [facility]   Phone:      Fax:   [phone]      Reason for request: continuity of care   Information to be released:    [  ] LAST COLONOSCOPY,  including any PATH REPORT and follow-up  [  ] LAST FIT/COLOGUARD RESULT [  ] LAST DEXA  [  ] LAST MAMMOGRAM  [  ] LAST PAP  [  ] LAST LABS [  ] RETINA EXAM REPORT  [  ] IMMUNIZATION RECORDS  [X  ] Release all info      [  ] Check here and initial the line next to each item to release ALL health information INCLUDING  _____ Care and treatment for drug and / or alcohol abuse  _____ HIV testing, infection status, or AIDS  _____ Genetic Testing    DATES OF SERVICE OR TIME PERIOD TO BE DISCLOSED: _____________  I understand and acknowledge that:  * This Authorization may be revoked at any time by you in writing, except if your health information has already been used or disclosed.  * Your health information that will be used or disclosed as a result of you signing this authorization could be re-disclosed by the recipient. If this occurs, your re-disclosed health information may no longer be protected by State or Federal laws.  * You may refuse to sign this Authorization. Your refusal will not affect your ability to obtain treatment.  * This Authorization becomes effective upon signing and will  on (date) __________.      If no date is indicated, this Authorization will  one (1) year from the signature date.    Name: Lefty Coley  Signature: Date:   2025     PLEASE FAX REQUESTED  RECORDS BACK TO: [phone]

## 2025-08-01 ENCOUNTER — OFFICE VISIT (OUTPATIENT)
Dept: URGENT CARE | Facility: CLINIC | Age: 60
End: 2025-08-01
Payer: COMMERCIAL

## 2025-08-01 VITALS
HEIGHT: 63 IN | SYSTOLIC BLOOD PRESSURE: 126 MMHG | TEMPERATURE: 102.6 F | BODY MASS INDEX: 47.48 KG/M2 | DIASTOLIC BLOOD PRESSURE: 60 MMHG | RESPIRATION RATE: 20 BRPM | HEART RATE: 103 BPM | WEIGHT: 268 LBS

## 2025-08-01 DIAGNOSIS — R50.9 FEVER, UNSPECIFIED FEVER CAUSE: ICD-10-CM

## 2025-08-01 DIAGNOSIS — R25.1 SHAKES: ICD-10-CM

## 2025-08-01 DIAGNOSIS — B34.9 VIRAL ILLNESS: Primary | ICD-10-CM

## 2025-08-01 LAB
FLUAV RNA SPEC QL NAA+PROBE: NEGATIVE
FLUBV RNA SPEC QL NAA+PROBE: NEGATIVE
RSV RNA SPEC QL NAA+PROBE: NEGATIVE
SARS-COV-2 RNA RESP QL NAA+PROBE: NEGATIVE

## 2025-08-01 PROCEDURE — 87637 SARSCOV2&INF A&B&RSV AMP PRB: CPT | Performed by: NURSE PRACTITIONER

## 2025-08-01 PROCEDURE — 3078F DIAST BP <80 MM HG: CPT | Performed by: NURSE PRACTITIONER

## 2025-08-01 PROCEDURE — 3074F SYST BP LT 130 MM HG: CPT | Performed by: NURSE PRACTITIONER

## 2025-08-01 PROCEDURE — 99214 OFFICE O/P EST MOD 30 MIN: CPT | Performed by: NURSE PRACTITIONER

## 2025-08-01 RX ORDER — ACETAMINOPHEN 500 MG
1000 TABLET ORAL ONCE
Status: COMPLETED | OUTPATIENT
Start: 2025-08-01 | End: 2025-08-01

## 2025-08-01 RX ADMIN — Medication 1000 MG: at 09:03
